# Patient Record
Sex: FEMALE | Race: WHITE | NOT HISPANIC OR LATINO | Employment: OTHER | URBAN - METROPOLITAN AREA
[De-identification: names, ages, dates, MRNs, and addresses within clinical notes are randomized per-mention and may not be internally consistent; named-entity substitution may affect disease eponyms.]

---

## 2020-12-23 ENCOUNTER — TRANSCRIBE ORDERS (OUTPATIENT)
Dept: NEUROSURGERY | Facility: CLINIC | Age: 55
End: 2020-12-23

## 2020-12-23 DIAGNOSIS — G89.4 CHRONIC PAIN SYNDROME: Primary | ICD-10-CM

## 2020-12-30 ENCOUNTER — OFFICE VISIT (OUTPATIENT)
Dept: NEUROSURGERY | Facility: CLINIC | Age: 55
End: 2020-12-30
Payer: MEDICARE

## 2020-12-30 VITALS
HEART RATE: 80 BPM | HEIGHT: 62 IN | BODY MASS INDEX: 38.83 KG/M2 | WEIGHT: 211 LBS | DIASTOLIC BLOOD PRESSURE: 65 MMHG | TEMPERATURE: 96.7 F | RESPIRATION RATE: 16 BRPM | SYSTOLIC BLOOD PRESSURE: 98 MMHG

## 2020-12-30 DIAGNOSIS — M48.061 SPINAL STENOSIS OF LUMBAR REGION, UNSPECIFIED WHETHER NEUROGENIC CLAUDICATION PRESENT: ICD-10-CM

## 2020-12-30 DIAGNOSIS — G89.4 CHRONIC PAIN SYNDROME: Primary | ICD-10-CM

## 2020-12-30 DIAGNOSIS — M54.9 BACK PAIN: ICD-10-CM

## 2020-12-30 DIAGNOSIS — M54.2 NECK PAIN: ICD-10-CM

## 2020-12-30 PROCEDURE — 99203 OFFICE O/P NEW LOW 30 MIN: CPT | Performed by: PHYSICIAN ASSISTANT

## 2020-12-30 RX ORDER — COVID-19 ANTIGEN TEST
440 KIT MISCELLANEOUS AS NEEDED
COMMUNITY
End: 2021-04-08 | Stop reason: ALTCHOICE

## 2020-12-30 RX ORDER — MULTIVIT-MIN/IRON/FOLIC ACID/K 18-600-40
2000 CAPSULE ORAL DAILY
COMMUNITY
End: 2022-02-21

## 2020-12-30 RX ORDER — PROPRANOLOL HYDROCHLORIDE 80 MG/1
80 TABLET ORAL
COMMUNITY
Start: 2020-10-22 | End: 2022-03-28 | Stop reason: ALTCHOICE

## 2020-12-30 RX ORDER — OXYCODONE AND ACETAMINOPHEN 10; 325 MG/1; MG/1
1 TABLET ORAL EVERY 6 HOURS PRN
COMMUNITY
Start: 2020-12-23 | End: 2021-01-22

## 2021-01-18 ENCOUNTER — HOSPITAL ENCOUNTER (OUTPATIENT)
Dept: RADIOLOGY | Facility: HOSPITAL | Age: 56
Discharge: HOME | End: 2021-01-18
Payer: MEDICARE

## 2021-01-18 DIAGNOSIS — M48.061 SPINAL STENOSIS OF LUMBAR REGION, UNSPECIFIED WHETHER NEUROGENIC CLAUDICATION PRESENT: ICD-10-CM

## 2021-01-18 DIAGNOSIS — M54.9 BACK PAIN: ICD-10-CM

## 2021-01-18 PROCEDURE — G1004 CDSM NDSC: HCPCS

## 2021-01-18 PROCEDURE — 72156 MRI NECK SPINE W/O & W/DYE: CPT

## 2021-01-18 PROCEDURE — A9585 GADOBUTROL INJECTION: HCPCS | Performed by: PHYSICIAN ASSISTANT

## 2021-01-18 RX ADMIN — GADOBUTROL 10 ML: 604.72 INJECTION INTRAVENOUS at 09:21

## 2021-02-03 ENCOUNTER — TELEPHONE (OUTPATIENT)
Dept: NEUROSURGERY | Facility: CLINIC | Age: 56
End: 2021-02-03

## 2021-02-04 ENCOUNTER — OFFICE VISIT (OUTPATIENT)
Dept: NEUROSURGERY | Facility: CLINIC | Age: 56
End: 2021-02-04
Payer: MEDICARE

## 2021-02-04 VITALS
SYSTOLIC BLOOD PRESSURE: 140 MMHG | HEIGHT: 62 IN | WEIGHT: 210 LBS | TEMPERATURE: 97.3 F | DIASTOLIC BLOOD PRESSURE: 90 MMHG | BODY MASS INDEX: 38.64 KG/M2

## 2021-02-04 DIAGNOSIS — G89.4 CHRONIC PAIN SYNDROME: ICD-10-CM

## 2021-02-04 DIAGNOSIS — Z98.1 S/P CERVICAL SPINAL FUSION: ICD-10-CM

## 2021-02-04 DIAGNOSIS — M54.2 NECK PAIN: Primary | ICD-10-CM

## 2021-02-04 DIAGNOSIS — R25.2 SPASTICITY: ICD-10-CM

## 2021-02-04 PROCEDURE — 99214 OFFICE O/P EST MOD 30 MIN: CPT | Performed by: PHYSICIAN ASSISTANT

## 2021-02-04 NOTE — PROGRESS NOTES
Neurosurgery Office Note  Bess Landry 48 y o  female MRN: [id number]      Assessment/Plan     Neck pain  Pt presents to the neurosurgery office for follow up of worsening neck pain with myelopathic symptoms to review MRI cervical spine  · Has hx of SCS insertions in cervical and thoracis spine at [facility] in 2019  The SCS in the Cervical spine was removed 3 days later secondary to a CSF leak  · Pt has the thoracic stimulator still  in place which helps her back pain  · She currently c/o worsening use of her hands, numbness, bilateral neck and shoulder pain/weakness, and difficulty with balance and falls  She denies UI/BI/saddle anesthesia    Imaging personally reviewed with attending:  · MRI cervical spine w/wo, 1/18/21: Patient is status post C3-T1 anterior fusion  Disc spacers are noted at C6-7 and C7-T1  Susceptibility artifact from the C7-T1 spacer projects into the spinal canal and there is potentially posterior slippage of the interdiscal component accounting for the susceptibility artifact  Plain film evaluation may be worthwhile to evaluate for posterior slippage  Several sites of myelomalacia in the cervical cord are again noted including slight increase at the T1 level when compared to the prior study  Remaining foci located at C4-5, C5, and C6-7 levels are unchanged  When compared to MRI cervical spine from 2019 at [facility], no significant change was appreciated    Plan  · Recommend continued conservative mgt with pain meds as prescribed and ongoing follow up with pain management  · Will obtain CT cervical spine without contrast to evaluate the fusion and bone quality   · Referral to PMR (Dr Duy Adame) to trial baclofen for spasticity and consideration of baclofen pump if indicated  · Follow up after CT and consult with Dr Duy Adame  If baclofen does not offer much relief will proceed with cervical SCS - this will need to be an open approach due to her extensive scar tissue     · Patient may need another SCS trial prior to surgery         Diagnoses and all orders for this visit:    Neck pain  -     CT cervical spine without contrast; Future  -     Ambulatory referral to Physical Medicine Rehab; Future    Chronic pain syndrome  -     CT cervical spine without contrast; Future  -     Ambulatory referral to Physical Medicine Rehab; Future    S/P cervical spinal fusion  -     CT cervical spine without contrast; Future  -     Ambulatory referral to Physical Medicine Rehab; Future    Spasticity  -     Ambulatory referral to Physical Medicine Rehab; Future            CHIEF COMPLAINT    Chief Complaint   Patient presents with    Follow-up     Chronic pain syndrome       HISTORY      This is a 70-year-old female with a past medical history significant for fibromyalgia, GERD, hypertension, IBS, ANG, urinary incontinence, history of multiple cervical fusions, thoracic spinal cord stimulator who is here today to review cervical MRI  She was seen several weeks ago for evaluation of worsening neck pain  She does have history of cervical spinal cord stimulator placement in April 2019 at [facility] in  Unfortunately, she developed a CSF leak and had to have her spinal cord stimulator removed  over the past few weeks, she noticed a significant worsening of her symptoms, she is describing neck pain radiating into her bilateral shoulders, difficulty using her hands, inability to use a knife and fork when eating, constantly dropping objects with her hands, difficulty with walking imbalance, multiple falls  She does have baseline urinary incontinence  She uses a cane at baseline  She is complaining of bilateral upper extremity weakness  She states the left arm is worse than the right arm  She does follow with Dr Alon Reyes in in pain management    She is taking percocet, gabapentin, Cymbalta she is interested in another cervical spinal cord stimulator if this is possible and may need to undergo another trial in order to proceed with this  MRI was read patient and her  today and shows no significant change to her MRI from 2019  At this time, it is unlikely there is any further surgery that would help her symptoms  Prior to trying another cervical spinal cord stimulator, we would like to send her to physiatry to trial baclofen  She may qualify for a baclofen pump to help with her spasticity  We will see her back after this consult with a CT cervical spine without contrast to evaluate her bone quality and cervical fusion to help with surgical planning  She is agreeable with this plan  REVIEW OF SYSTEMS    Review of Systems   Constitutional: Positive for fatigue  HENT: Negative  Eyes: Negative  Respiratory: Negative  Cardiovascular: Negative  Gastrointestinal: Negative  Endocrine: Negative  Genitourinary:        Incontinence   Musculoskeletal: Positive for arthralgias (Pain into the hips, buttocks and legs), back pain (Low back pain) and neck pain  Skin: Negative  Neurological: Positive for weakness (All extremities) and numbness (All extremities)  Cold sensation Right foot     Hematological: Negative  Psychiatric/Behavioral: Negative  ROS was personally reviewed and changes made as needed     Meds/Allergies     Current Outpatient Medications   Medication Sig Dispense Refill    Citalopram Hydrobromide (CELEXA PO) Take by mouth daily      DULoxetine (CYMBALTA) 60 mg delayed release capsule Take 60 mg by mouth 2 (two) times a day   gabapentin (NEURONTIN) 800 mg tablet Take 900 mg by mouth 3 (three) times a day  3 capsuless of 300mg TID       lansoprazole (PREVACID) 30 mg capsule Take 30 mg by mouth every morning   lisinopril-hydrochlorothiazide (PRINZIDE,ZESTORETIC) 20-25 MG per tablet Take 1 tablet by mouth daily        Menthol, Topical Analgesic, (BIOFREEZE EX) Apply topically      Multiple Vitamins-Minerals (MULTIVITAMIN ADULT PO) daily      Naproxen Sodium (Aleve) 220 MG CAPS Take 440 mg by mouth as needed      oxyCODONE-acetaminophen (PERCOCET) 5-325 mg per tablet Si-2 tabs PO Q4H PRN    Ongoing therapy 40 tablet 0    propranolol (INDERAL) 80 mg tablet Take 80 mg by mouth daily      rOPINIRole (REQUIP) 2 mg tablet Take 2 mg by mouth daily at bedtime   Vitamin D, Cholecalciferol, 50 MCG (2000 UT) CAPS Take 2,000 Units by mouth daily      Camphor-Menthol-Methyl Sal (FLEXALL ULTRA PLUS EX) Salonpas      methocarbamol (ROBAXIN) 750 mg tablet Take 750 mg by mouth 3 (three) times a day  No current facility-administered medications for this visit  Allergies   Allergen Reactions    Strawberry Extract Anaphylaxis    Iodine Rash     IV dye reaction: rash pt denies iodine allergy and states ct scan dye allergy only    Other      CT Scan IV Dye  Hives         PAST HISTORY    Past Medical History:   Diagnosis Date    Abdominal pain     Anxiety     Arthritis     Back pain     Breathing difficulty     Depression     Diarrhea     Dizziness     Edentulous     Fibromyalgia     Fibromyalgia     Full dentures     Upper and Lower  Can't wear right now due to recent surgery for sleep surgery    Heartburn     History of colon polyps     History of fall     History of HPV infection     Hypertension     IBS (irritable bowel syndrome)     Intermittent palpitations     Irregular heart beat     Leg pain, bilateral     Neuropathy     Bilateral hands, arms   Numbness tops of legs and buttocks and feet    Pneumonia     History of walking pneumonia multiple times    Restless leg syndrome     Seasonal allergies     Shortness of breath     Sleep apnea     History of- recent surgery to correct    Spinal stenosis     Urinary incontinence     Use of cane as ambulatory aid     Wears glasses        Past Surgical History:   Procedure Laterality Date    BLADDER SURGERY  02/10/2016    botox injections    CERVICAL FUSION  , , 2004    x4 C3-4, C4-5, C5-6, C6-7    CHOLECYSTECTOMY  2000    lap ad    COLONOSCOPY      COLONOSCOPY N/A 5/6/2016    Procedure: COLONOSCOPY;  Surgeon: Dominic Sanabria MD;  [facility]; Service:     GYNECOLOGIC CRYOSURGERY      HI ARTHRODESIS ANT INTERBODY MIN DISCECTOMY,LUMBAR N/A 9/13/2016    Procedure: FUSION LUMBAR INTERBODY ANTERIOR APPROACH  L4-5 L5-S1  WITH POSTERIOR INSTRUMENTATION ;  Surgeon: Cleve Patterson MD;  Location: [facility];  Service: Orthopedics    HI EXCISION 7[facility] N/A 8/24/2016    Procedure: Loretta Kocher;  Surgeon: Lelia Hope MD;  [facility];  Service: ENT    HI PALATOPHAYNGOPLASTY N/A 8/24/2016    Procedure: UVULOPALATOPHARYNGOPLASTY (UPPP); Surgeon: Lelia Hope MD;  [facility];  Service: ENT    SKIN BIOPSY  2015    R arm, scalp- all benign    SPINAL CORD STIMULATOR IMPLANT      lumbar  in place    SPINAL CORD STIMULATOR REMOVAL      cervical    TONSILLECTOMY      TUBAL LIGATION         Social History     Tobacco Use    Smoking status: Current Every Day Smoker     Packs/day: 1 00     Years: 38 00     Pack years: 38 00    Smokeless tobacco: Never Used   Substance Use Topics    Alcohol use: No    Drug use: No     Frequency: 7 0 times per week     Comment: CBD oil, THC       Family History   Problem Relation Age of Onset    Thyroid cancer Mother     Thyroid cancer Father     Alcohol abuse Sister     No Known Problems Brother     Cancer Family          Above history personally reviewed  EXAM    Vitals:Blood pressure 140/90, temperature (!) 97 3 °F (36 3 °C), temperature source Temporal, height 5' 2" (1 575 m), weight 95 3 kg (210 lb), not currently breastfeeding  ,Body mass index is 38 41 kg/m²  Physical Exam  Vitals signs and nursing note reviewed  Constitutional:       Appearance: Normal appearance  She is well-developed and normal weight  HENT:      Head: Normocephalic and atraumatic     Eyes:      Extraocular Movements: Extraocular movements intact  Pupils: Pupils are equal, round, and reactive to light  Neck:      Musculoskeletal: Normal range of motion  Cardiovascular:      Rate and Rhythm: Normal rate  Pulmonary:      Effort: Pulmonary effort is normal  No respiratory distress  Abdominal:      Palpations: Abdomen is soft  Musculoskeletal: Normal range of motion  Skin:     General: Skin is warm and dry  Neurological:      Mental Status: She is alert and oriented to person, place, and time  Gait: Tandem walk abnormal       Deep Tendon Reflexes:      Reflex Scores:       Tricep reflexes are 3+ on the right side and 3+ on the left side  Bicep reflexes are 3+ on the right side and 3+ on the left side  Brachioradialis reflexes are 3+ on the right side and 3+ on the left side  Patellar reflexes are 3+ on the right side and 3+ on the left side  Achilles reflexes are 3+ on the right side and 3+ on the left side  Psychiatric:         Mood and Affect: Mood normal          Speech: Speech normal          Behavior: Behavior normal          Thought Content: Thought content normal          Judgment: Judgment normal          Neurologic Exam     Mental Status   Oriented to person, place, and time  Follows 2 step commands  Attention: normal  Concentration: normal    Speech: speech is normal   Level of consciousness: alert  Knowledge: good  Able to repeat  Normal comprehension  Cranial Nerves   Cranial nerves II through XII intact  CN III, IV, VI   Pupils are equal, round, and reactive to light      Motor Exam   Muscle bulk: normal  Overall muscle tone: normal  Left arm pronator drift: absent  5/5 bilateral deltoids  4/5 bilateral bi/tri  5/5 bilateral wrist extension  4/5 bilateral wrist flexion  5/5 LEFT IO, 4/5 RIGHT IO    5/5 BLE     Sensory Exam   Decreased sensation to LT and PP from elbows to fingers bilaterally, L=R  Difficulty with cervical extension     Gait, Coordination, and Reflexes     Coordination   Tandem walking coordination: abnormal    Tremor   Resting tremor: absent    Reflexes   Right brachioradialis: 3+  Left brachioradialis: 3+  Right biceps: 3+  Left biceps: 3+  Right triceps: 3+  Left triceps: 3+  Right patellar: 3+  Left patellar: 3+  Right achilles: 3+  Left achilles: 3+  Right Champion: present  Left Champion: present  Right ankle clonus: absent  Left ankle clonus: absent        MEDICAL DECISION MAKING    Imaging Studies:     Mri Cervical Spine With And Without Contrast    Result Date: 1/19/2021  Narrative: MRI CERVICAL SPINE WITH AND WITHOUT CONTRAST INDICATION: M48 061: Spinal stenosis, lumbar region without neurogenic claudication M54 9: Dorsalgia, unspecified  COMPARISON:  Outside MRI December 24, 2019  TECHNIQUE:  Sagittal T1, sagittal T2, sagittal inversion recovery, axial 2D merge and axial T2  Sagittal T1 and axial T1 postcontrast   IV Contrast:  10 mL of Gadobutrol injection (SINGLE-DOSE) IMAGE QUALITY:  Diagnostic  FINDINGS: ALIGNMENT:  Straightening of the cervical lordosis  The patient is status post anterior fusion C3-T1  Metallic susceptibility artifact is identified within the vertebral bodies  Susceptibility artifact extends into the ventral aspect of the spinal canal at the C7-T1 level  This may indicate some degree of slippage of the C7-T1 intradisc plate posteriorly  This is unchanged from the outside MRI  MARROW SIGNAL:  Normal marrow signal is identified within the visualized bony structures  No discrete marrow lesion  CERVICAL AND VISUALIZED UPPER THORACIC CORD:  Several foci of cord signal abnormality are identified including C4-5, posterior to the C6 vertebral body, at C6-7 level, and at the inferior T1 level  The T1 lesion has increased in size from the prior study  This is seen best on sagittal series 3 image 7   PREVERTEBRAL AND PARASPINAL SOFT TISSUES:  Degenerative changes are seen in the sternoclavicular joints bilaterally left more so than right  VISUALIZED POSTERIOR FOSSA:  The visualized posterior fossa demonstrates no abnormal signal  CERVICAL DISC SPACES: C2-C3:  Moderate facet hypertrophy  Mild central stenosis  Foramina patent  C3-C4:  This level has been fused  Tiny uncinate osteophyte on the left  C4-C5:  This level is fused  No significant central or foraminal narrowing  C5-C6:  This level is fused  No significant central stenosis  Small marginal osteophytes are seen  There is C6-C7:  Mild facet hypertrophy  Small marginal osteophyte on left  Minimal foraminal narrowing  C7-T1:  Susceptibility artifact projects into the spinal canal ventrally as described  Possible mild central stenosis  UPPER THORACIC DISC SPACES:  Normal  POSTCONTRAST IMAGING:  Normal      Impression: Patient is status post C3-T1 anterior fusion  Disc spacers are noted at C6-7 and C7-T1  Susceptibility artifact from the C7-T1 spacer projects into the spinal canal and there is potentially posterior slippage of the interdiscal component accounting for  the susceptibility artifact  Plain film evaluation may be worthwhile to evaluate for posterior slippage  Several sites of myelomalacia in the cervical cord are again noted including slight increase at the T1 level when compared to the prior study  Remaining foci located at C4-5, C5, and C6-7 levels are unchanged  Bilateral sternoclavicular joint degenerative changes  Workstation performed: [id number]       I have personally reviewed pertinent reports     and I have personally reviewed pertinent films in PACS

## 2021-02-04 NOTE — PATIENT INSTRUCTIONS
Return in 6 weeks after establishing with Dr Betty Dykes    Obtain CT cervical spine prior to appointment

## 2021-02-04 NOTE — ASSESSMENT & PLAN NOTE
Pt presents to the neurosurgery office for follow up of worsening neck pain with myelopathic symptoms to review MRI cervical spine  · Has hx of SCS insertions in cervical and thoracis spine at [facility] in 2019  The SCS in the Cervical spine was removed 3 days later secondary to a CSF leak  · Pt has the thoracic stimulator still  in place which helps her back pain  · She currently c/o worsening use of her hands, numbness, bilateral neck and shoulder pain/weakness, and difficulty with balance and falls  She denies UI/BI/saddle anesthesia    Imaging personally reviewed with attending:  · MRI cervical spine w/wo, 1/18/21: Patient is status post C3-T1 anterior fusion  Disc spacers are noted at C6-7 and C7-T1  Susceptibility artifact from the C7-T1 spacer projects into the spinal canal and there is potentially posterior slippage of the interdiscal component accounting for the susceptibility artifact  Plain film evaluation may be worthwhile to evaluate for posterior slippage  Several sites of myelomalacia in the cervical cord are again noted including slight increase at the T1 level when compared to the prior study  Remaining foci located at C4-5, C5, and C6-7 levels are unchanged  When compared to MRI cervical spine from 2019 at [facility], no significant change was appreciated    Plan  · Recommend continued conservative mgt with pain meds as prescribed and ongoing follow up with pain management  · Will obtain CT cervical spine without contrast to evaluate the fusion and bone quality   · Referral to PMR (Dr Betty Dykes) to trial baclofen for spasticity and consideration of baclofen pump if indicated  · Follow up after CT and consult with Dr Betty Dykes  If baclofen does not offer much relief will proceed with cervical SCS - this will need to be an open approach due to her extensive scar tissue     · Patient may need another SCS trial prior to surgery

## 2021-03-08 ENCOUNTER — HOSPITAL ENCOUNTER (OUTPATIENT)
Dept: CT IMAGING | Facility: HOSPITAL | Age: 56
Discharge: HOME | End: 2021-03-08
Payer: MEDICARE

## 2021-03-08 DIAGNOSIS — M54.2 NECK PAIN: ICD-10-CM

## 2021-03-08 DIAGNOSIS — Z98.1 S/P CERVICAL SPINAL FUSION: ICD-10-CM

## 2021-03-08 DIAGNOSIS — G89.4 CHRONIC PAIN SYNDROME: ICD-10-CM

## 2021-03-08 PROCEDURE — G1004 CDSM NDSC: HCPCS

## 2021-03-08 PROCEDURE — 72125 CT NECK SPINE W/O DYE: CPT

## 2021-03-11 ENCOUNTER — OFFICE VISIT (OUTPATIENT)
Dept: NEUROSURGERY | Facility: CLINIC | Age: 56
End: 2021-03-11
Payer: MEDICARE

## 2021-03-11 VITALS
BODY MASS INDEX: 37.17 KG/M2 | SYSTOLIC BLOOD PRESSURE: 131 MMHG | HEIGHT: 62 IN | TEMPERATURE: 99.5 F | WEIGHT: 202 LBS | RESPIRATION RATE: 16 BRPM | DIASTOLIC BLOOD PRESSURE: 90 MMHG | HEART RATE: 125 BPM

## 2021-03-11 DIAGNOSIS — M54.2 NECK PAIN: ICD-10-CM

## 2021-03-11 DIAGNOSIS — G89.4 CHRONIC PAIN SYNDROME: Primary | ICD-10-CM

## 2021-03-11 DIAGNOSIS — M54.2 CERVICALGIA: ICD-10-CM

## 2021-03-11 DIAGNOSIS — R25.2 SPASTICITY: ICD-10-CM

## 2021-03-11 PROCEDURE — 99214 OFFICE O/P EST MOD 30 MIN: CPT | Performed by: PHYSICIAN ASSISTANT

## 2021-03-11 RX ORDER — CHLORHEXIDINE GLUCONATE 0.12 MG/ML
15 RINSE ORAL ONCE
Status: CANCELLED | OUTPATIENT
Start: 2021-03-11 | End: 2021-03-11

## 2021-03-11 RX ORDER — CEFAZOLIN SODIUM 2 G/50ML
2000 SOLUTION INTRAVENOUS ONCE
Status: CANCELLED | OUTPATIENT
Start: 2021-04-09 | End: 2021-03-11

## 2021-03-11 RX ORDER — CEFAZOLIN SODIUM 2 G/50ML
2000 SOLUTION INTRAVENOUS ONCE
Status: CANCELLED | OUTPATIENT
Start: 2021-04-16 | End: 2021-03-11

## 2021-03-11 RX ORDER — BACLOFEN 10 MG/1
10 TABLET ORAL 3 TIMES DAILY
COMMUNITY
Start: 2021-02-25 | End: 2021-06-17 | Stop reason: ALTCHOICE

## 2021-03-11 NOTE — PROGRESS NOTES
Neurosurgery Office Note  Jason De La Vega 48 y o  female MRN: [id number]      Assessment/Plan     Neck pain  Pt presents to the neurosurgery office for follow up of worsening neck pain with myelopathic symptoms to review CT cervical spine and discuss surgical options  · Has hx of SCS insertions in cervical and thoracis spine at [facility] in 2019  The SCS in the Cervical spine was removed 3 days later secondary to a CSF leak  · Pt has the thoracic stimulator still  in place which helps her back pain ([address])  · She currently c/o worsening use of her hands, numbness, bilateral neck and shoulder pain/weakness, and difficulty with balance and falls  She denies UI/BI/saddle anesthesia  · She recently started baclofen after see Dr Ade Jackson    Imaging personally reviewed with attending:  · MRI cervical spine w/wo, 1/18/21: Patient is status post C3-T1 anterior fusion  Disc spacers are noted at C6-7 and C7-T1  Susceptibility artifact from the C7-T1 spacer projects into the spinal canal and there is potentially posterior slippage of the interdiscal component accounting for the susceptibility artifact  Plain film evaluation may be worthwhile to evaluate for posterior slippage  Several sites of myelomalacia in the cervical cord are again noted including slight increase at the T1 level when compared to the prior study  Remaining foci located at C4-5, C5, and C6-7 levels are unchanged  When compared to MRI cervical spine from 2019 at [facility], no significant change was appreciated  · CT cervical spine w/o, 3/8/21: Final read pending  Imaging personally reviewed by Dr Copeland Reason today    Plan  · Continue baclofen as instructed and follow up with Dr Ade Jackson  · We had a lengthy discussion with the patient and she elected to proceed with the cervical SCS as opposed to a baclofen pump right now as she is having significant dysesthesias and the oral baclofen isn't working well     · Risks and benefits of the surgery were discussed in great detail  Due to her extensive surgeries and scar tissue, there is a higher chance of nerve damage and CSF leak  There is a higher change of inability to insert SCS introperatively due to scarring  She understands these risks and would like to proceed  · Her last psychology evaluation was 3-4 years ago for the thoracic SCS; she will obtain these records but a referral was provided today for a new evaluation if needed  · Our office will call to schedule surgery  Diagnoses and all orders for this visit:    Neck pain  -     Ambulatory referral to Psychology; Future    Spasticity  -     Ambulatory referral to Psychology; Future    Chronic pain syndrome  -     Ambulatory referral to Psychology; Future    Other orders  -     baclofen 10 mg tablet; Take 10 mg by mouth 3 (three) times a day            CHIEF COMPLAINT    Chief Complaint   Patient presents with    Follow-up      5 weeks instead of 6 weeks ct @ [facility] shared Sotero Jalloh / yuval ct cervical (3/8/21)       HISTORY    This is a 61-year-old female with a past medical history significant for fibromyalgia, GERD, hypertension, IBS, ANG, urinary incontinence, history of multiple cervical fusions, thoracic spinal cord stimulator who is here today to review CT cervical spine in evaluate her symptoms on baclofen  She does have a history of cervical spinal cord stimulator placement in April 2019 at [facility]  Unfortunately, she developed a CSF leak and had to have her spinal cord stimulator removed 3 days later  At her last visit, we referred her to Dr Victorina Graf for initiation of baclofen and consideration of a baclofen pump as the patient is very myelopathic and spastic  She has severe neck pain and bilateral upper extremity pain and difficulty with fine motor skills and is interested in replacement of her cervical spinal cord stimulator  However, we feel that she may benefit more from a baclofen pump    This could address her myelopathic symptoms as well as her pain       Since we last saw the patient, she states she is having increasing pain wrapping around her rib cage "like cellophane" and radiating down her torso into her legs  She is still complaining of neck pain radiating into her bilateral shoulders, difficulties in her hands, inability to use a knife and fork when eating, constantly dropping objects with her hands, difficulty with walking and balance, multiple falls  She has baseline urinary incontinence  She uses a cane at baseline  She does state that her left arm continues to be worse than her right arm  She does follow with Dr Maryln Cheadle in pain management  She is taking Percocet, gabapentin, and Cymbalta  She recently started baclofen and is currently taking 10 mg 3 times daily  She does not feel this is helping her pain  MRI cervical spine was reviewed at her last appointment and does not show any significant change compared to her MRI from 2019  CT cervical spine was reviewed today with Dr Ade Charles  This shows an intact cervical fusion with no hardware failure  REVIEW OF SYSTEMS    Review of Systems   Constitutional: Positive for fatigue  Feeling poorly   HENT: Positive for trouble swallowing  Eyes: Negative  Respiratory: Positive for shortness of breath  Cardiovascular: Negative  Gastrointestinal: Positive for abdominal pain  Endocrine: Negative  Cold sensation Right foot   Genitourinary: Positive for frequency and urgency  Incontinence and leakage, especially when coughing/sneezing   Musculoskeletal: Positive for arthralgias (Pain into the hips, buttocks and legs), back pain (constant across low back pain) and neck pain  New pain, from under breast down to feet, extreme pain/tightness   Skin: Negative  Allergic/Immunologic: Positive for food allergies (strawberry/strawberry extract)     Neurological: Positive for weakness (All extremities), numbness (All extremities) and headaches (and feels heart beat left side of head)  Negative for dizziness, seizures and syncope  Hematological: Negative  Psychiatric/Behavioral: Positive for sleep disturbance (due to pain)  ROS was personally reviewed and changes made as needed     Meds/Allergies     Current Outpatient Medications   Medication Sig Dispense Refill    baclofen 10 mg tablet Take 10 mg by mouth 3 (three) times a day      Camphor-Menthol-Methyl Sal (FLEXALL ULTRA PLUS EX) Salonpas      Citalopram Hydrobromide (CELEXA PO) Take by mouth daily      DULoxetine (CYMBALTA) 60 mg delayed release capsule Take 60 mg by mouth 2 (two) times a day   gabapentin (NEURONTIN) 300 mg capsule Take 900 mg by mouth 3 (three) times a day 3 capsuless of 300mg TID      lansoprazole (PREVACID) 30 mg capsule Take 30 mg by mouth every morning   lisinopril-hydrochlorothiazide (PRINZIDE,ZESTORETIC) 20-25 MG per tablet Take 1 tablet by mouth daily   Menthol, Topical Analgesic, (BIOFREEZE EX) Apply topically      Naproxen Sodium (Aleve) 220 MG CAPS Take 440 mg by mouth as needed      oxyCODONE-acetaminophen (PERCOCET) 5-325 mg per tablet Si-2 tabs PO Q4H PRN    Ongoing therapy 40 tablet 0    propranolol (INDERAL) 80 mg tablet Take 80 mg by mouth daily      rOPINIRole (REQUIP) 2 mg tablet Take 2 mg by mouth daily at bedtime   Vitamin D, Cholecalciferol, 50 MCG ( UT) CAPS Take 2,000 Units by mouth daily      methocarbamol (ROBAXIN) 750 mg tablet Take 750 mg by mouth 3 (three) times a day   Multiple Vitamins-Minerals (MULTIVITAMIN ADULT PO) daily       No current facility-administered medications for this visit          Allergies   Allergen Reactions    Strawberry Extract Anaphylaxis    Iodine Rash     IV dye reaction: rash pt denies iodine allergy and states ct scan dye allergy only    Other      CT Scan IV Dye  Hives         PAST HISTORY    Past Medical History:   Diagnosis Date    Abdominal pain     Anxiety     Arthritis     Back pain     Breathing difficulty     Depression     Diarrhea     Dizziness     Edentulous     Fibromyalgia     Fibromyalgia     Full dentures     Upper and Lower  Can't wear right now due to recent surgery for sleep surgery    Heartburn     History of colon polyps     History of fall     History of HPV infection     Hypertension     IBS (irritable bowel syndrome)     Intermittent palpitations     Irregular heart beat     Leg pain, bilateral     Neuropathy     Bilateral hands, arms  Numbness tops of legs and buttocks and feet    Pneumonia     History of walking pneumonia multiple times    Restless leg syndrome     Seasonal allergies     Shortness of breath     Sleep apnea     History of- recent surgery to correct    Spinal stenosis     Urinary incontinence     Use of cane as ambulatory aid     Wears glasses        Past Surgical History:   Procedure Laterality Date    BLADDER SURGERY  02/10/2016    botox injections    CERVICAL FUSION  2011, 1997, 2004    x4 C3-4, C4-5, C5-6, C6-7    CHOLECYSTECTOMY  2000    lap ad    COLONOSCOPY      COLONOSCOPY N/A 5/6/2016    Procedure: COLONOSCOPY;  Surgeon: Adryan Allan MD;  [facility]: [facility]; Service:     GYNECOLOGIC CRYOSURGERY      TN ARTHRODESIS ANT INTERBODY MIN DISCECTOMY,LUMBAR N/A 9/13/2016    Procedure: FUSION LUMBAR INTERBODY ANTERIOR APPROACH  L4-5 L5-S1  WITH POSTERIOR INSTRUMENTATION ;  Surgeon: Comfort Ocasio MD;  Location: [facility];  Service: Orthopedics    TN EXCISION 708 [facility] N/A 8/24/2016    Procedure: Gaila Son;  Surgeon: Jeremias Del Valle MD;  [facility];  Service: ENT    TN PALATOPHAYNGOPLASTY N/A 8/24/2016    Procedure: UVULOPALATOPHARYNGOPLASTY (UPPP);   Surgeon: Jeremias Del Valle MD;  [facility];  Service: ENT    SKIN BIOPSY  2015    R arm, scalp- all benign    SPINAL CORD STIMULATOR IMPLANT      lumbar  in place    SPINAL CORD STIMULATOR REMOVAL      cervical    TONSILLECTOMY      TUBAL LIGATION         Social History     Tobacco Use    Smoking status: Current Every Day Smoker     Packs/day: 1 00     Years: 38 00     Pack years: 38 00    Smokeless tobacco: Never Used   Substance Use Topics    Alcohol use: No    Drug use: No     Frequency: 7 0 times per week     Comment: CBD oil, THC       Family History   Problem Relation Age of Onset    Thyroid cancer Mother     Thyroid cancer Father     Alcohol abuse Sister     No Known Problems Brother     Cancer Family          Above history personally reviewed  EXAM    Vitals:Blood pressure 131/90, pulse (!) 125, temperature 99 5 °F (37 5 °C), resp  rate 16, height 5' 2" (1 575 m), weight 91 6 kg (202 lb), not currently breastfeeding  ,Body mass index is 36 95 kg/m²  Physical Exam  Vitals signs and nursing note reviewed  Constitutional:       Appearance: Normal appearance  She is well-developed and normal weight  HENT:      Head: Normocephalic and atraumatic  Eyes:      Extraocular Movements: Extraocular movements intact  Pupils: Pupils are equal, round, and reactive to light  Neck:      Musculoskeletal: Normal range of motion  Cardiovascular:      Rate and Rhythm: Normal rate  Pulmonary:      Effort: Pulmonary effort is normal  No respiratory distress  Abdominal:      Palpations: Abdomen is soft  Musculoskeletal: Normal range of motion  Skin:     General: Skin is warm and dry  Neurological:      Mental Status: She is alert and oriented to person, place, and time  Gait: Tandem walk abnormal       Deep Tendon Reflexes:      Reflex Scores:       Tricep reflexes are 2+ on the right side and 2+ on the left side  Bicep reflexes are 2+ on the right side and 2+ on the left side  Brachioradialis reflexes are 2+ on the right side and 2+ on the left side  Patellar reflexes are 1+ on the right side and 1+ on the left side    Psychiatric:         Mood and Affect: Mood normal  Speech: Speech normal          Behavior: Behavior normal          Thought Content: Thought content normal          Judgment: Judgment normal          Neurologic Exam     Mental Status   Oriented to person, place, and time  Follows 2 step commands  Attention: normal  Concentration: normal    Speech: speech is normal   Level of consciousness: alert  Knowledge: good  Able to repeat  Normal comprehension  Cranial Nerves   Cranial nerves II through XII intact  CN III, IV, VI   Pupils are equal, round, and reactive to light  Motor Exam   Muscle bulk: normal  Overall muscle tone: increased (BLE)  Right arm pronator drift: absent  Left arm pronator drift: absent    Strength   Strength 5/5 except as noted  4/5 bilateral bi/tri  4/5 left wrist extension  4/5 BLE throughout  Limited cervical extension     Sensory Exam   Decreased sensation bilateral elbows to all fingers, L>R  Paresthesias bilateral fingertips  Decreased sensation bilateral knees to feet with dysesthesias bilateral feet     Gait, Coordination, and Reflexes     Gait  Gait: spastic    Coordination   Tandem walking coordination: abnormal    Tremor   Resting tremor: absent    Reflexes   Right brachioradialis: 2+  Left brachioradialis: 2+  Right biceps: 2+  Left biceps: 2+  Right triceps: 2+  Left triceps: 2+  Right patellar: 1+  Left patellar: 1+  Right Champion: absent  Left Champion: absent  Right ankle clonus: absent  Left ankle clonus: absent        MEDICAL DECISION MAKING    Imaging Studies:     No results found  I have personally reviewed pertinent reports     and I have personally reviewed pertinent films in PACS

## 2021-03-11 NOTE — H&P (VIEW-ONLY)
Neurosurgery Office Note  Christy Joshua 48 y o  female MRN: [id number]      Assessment/Plan     Neck pain  Pt presents to the neurosurgery office for follow up of worsening neck pain with myelopathic symptoms to review CT cervical spine and discuss surgical options  · Has hx of SCS insertions in cervical and thoracis spine at [facility] in 2019  The SCS in the Cervical spine was removed 3 days later secondary to a CSF leak  · Pt has the thoracic stimulator still  in place which helps her back pain ([address])  · She currently c/o worsening use of her hands, numbness, bilateral neck and shoulder pain/weakness, and difficulty with balance and falls  She denies UI/BI/saddle anesthesia  · She recently started baclofen after see Dr Bharati Medina    Imaging personally reviewed with attending:  · MRI cervical spine w/wo, 1/18/21: Patient is status post C3-T1 anterior fusion  Disc spacers are noted at C6-7 and C7-T1  Susceptibility artifact from the C7-T1 spacer projects into the spinal canal and there is potentially posterior slippage of the interdiscal component accounting for the susceptibility artifact  Plain film evaluation may be worthwhile to evaluate for posterior slippage  Several sites of myelomalacia in the cervical cord are again noted including slight increase at the T1 level when compared to the prior study  Remaining foci located at C4-5, C5, and C6-7 levels are unchanged  When compared to MRI cervical spine from 2019 at [facility], no significant change was appreciated  · CT cervical spine w/o, 3/8/21: Final read pending  Imaging personally reviewed by Dr Nathan Ferguson today    Plan  · Continue baclofen as instructed and follow up with Dr Bharati Medina  · We had a lengthy discussion with the patient and she elected to proceed with the cervical SCS as opposed to a baclofen pump right now as she is having significant dysesthesias and the oral baclofen isn't working well     · Risks and benefits of the surgery were discussed in great detail  Due to her extensive surgeries and scar tissue, there is a higher chance of nerve damage and CSF leak  There is a higher change of inability to insert SCS introperatively due to scarring  She understands these risks and would like to proceed  · Her last psychology evaluation was 3-4 years ago for the thoracic SCS; she will obtain these records but a referral was provided today for a new evaluation if needed  · Our office will call to schedule surgery  Diagnoses and all orders for this visit:    Neck pain  -     Ambulatory referral to Psychology; Future    Spasticity  -     Ambulatory referral to Psychology; Future    Chronic pain syndrome  -     Ambulatory referral to Psychology; Future    Other orders  -     baclofen 10 mg tablet; Take 10 mg by mouth 3 (three) times a day            CHIEF COMPLAINT    Chief Complaint   Patient presents with    Follow-up      5 weeks instead of 6 weeks ct @ [facility] shared Arina Rea / yuval ct cervical (3/8/21)       HISTORY    This is a 28-year-old female with a past medical history significant for fibromyalgia, GERD, hypertension, IBS, ANG, urinary incontinence, history of multiple cervical fusions, thoracic spinal cord stimulator who is here today to review CT cervical spine in evaluate her symptoms on baclofen  She does have a history of cervical spinal cord stimulator placement in April 2019 at [facility]  Unfortunately, she developed a CSF leak and had to have her spinal cord stimulator removed 3 days later  At her last visit, we referred her to Dr Adryan Llamas for initiation of baclofen and consideration of a baclofen pump as the patient is very myelopathic and spastic  She has severe neck pain and bilateral upper extremity pain and difficulty with fine motor skills and is interested in replacement of her cervical spinal cord stimulator  However, we feel that she may benefit more from a baclofen pump    This could address her myelopathic symptoms as well as her pain       Since we last saw the patient, she states she is having increasing pain wrapping around her rib cage "like cellophane" and radiating down her torso into her legs  She is still complaining of neck pain radiating into her bilateral shoulders, difficulties in her hands, inability to use a knife and fork when eating, constantly dropping objects with her hands, difficulty with walking and balance, multiple falls  She has baseline urinary incontinence  She uses a cane at baseline  She does state that her left arm continues to be worse than her right arm  She does follow with Dr Jamarcus Christopher in pain management  She is taking Percocet, gabapentin, and Cymbalta  She recently started baclofen and is currently taking 10 mg 3 times daily  She does not feel this is helping her pain  MRI cervical spine was reviewed at her last appointment and does not show any significant change compared to her MRI from 2019  CT cervical spine was reviewed today with Dr Zuly Walters  This shows an intact cervical fusion with no hardware failure  REVIEW OF SYSTEMS    Review of Systems   Constitutional: Positive for fatigue  Feeling poorly   HENT: Positive for trouble swallowing  Eyes: Negative  Respiratory: Positive for shortness of breath  Cardiovascular: Negative  Gastrointestinal: Positive for abdominal pain  Endocrine: Negative  Cold sensation Right foot   Genitourinary: Positive for frequency and urgency  Incontinence and leakage, especially when coughing/sneezing   Musculoskeletal: Positive for arthralgias (Pain into the hips, buttocks and legs), back pain (constant across low back pain) and neck pain  New pain, from under breast down to feet, extreme pain/tightness   Skin: Negative  Allergic/Immunologic: Positive for food allergies (strawberry/strawberry extract)     Neurological: Positive for weakness (All extremities), numbness (All extremities) and headaches (and feels heart beat left side of head)  Negative for dizziness, seizures and syncope  Hematological: Negative  Psychiatric/Behavioral: Positive for sleep disturbance (due to pain)  ROS was personally reviewed and changes made as needed     Meds/Allergies     Current Outpatient Medications   Medication Sig Dispense Refill    baclofen 10 mg tablet Take 10 mg by mouth 3 (three) times a day      Camphor-Menthol-Methyl Sal (FLEXALL ULTRA PLUS EX) Salonpas      Citalopram Hydrobromide (CELEXA PO) Take by mouth daily      DULoxetine (CYMBALTA) 60 mg delayed release capsule Take 60 mg by mouth 2 (two) times a day   gabapentin (NEURONTIN) 300 mg capsule Take 900 mg by mouth 3 (three) times a day 3 capsuless of 300mg TID      lansoprazole (PREVACID) 30 mg capsule Take 30 mg by mouth every morning   lisinopril-hydrochlorothiazide (PRINZIDE,ZESTORETIC) 20-25 MG per tablet Take 1 tablet by mouth daily   Menthol, Topical Analgesic, (BIOFREEZE EX) Apply topically      Naproxen Sodium (Aleve) 220 MG CAPS Take 440 mg by mouth as needed      oxyCODONE-acetaminophen (PERCOCET) 5-325 mg per tablet Si-2 tabs PO Q4H PRN    Ongoing therapy 40 tablet 0    propranolol (INDERAL) 80 mg tablet Take 80 mg by mouth daily      rOPINIRole (REQUIP) 2 mg tablet Take 2 mg by mouth daily at bedtime   Vitamin D, Cholecalciferol, 50 MCG ( UT) CAPS Take 2,000 Units by mouth daily      methocarbamol (ROBAXIN) 750 mg tablet Take 750 mg by mouth 3 (three) times a day   Multiple Vitamins-Minerals (MULTIVITAMIN ADULT PO) daily       No current facility-administered medications for this visit          Allergies   Allergen Reactions    Strawberry Extract Anaphylaxis    Iodine Rash     IV dye reaction: rash pt denies iodine allergy and states ct scan dye allergy only    Other      CT Scan IV Dye  Hives         PAST HISTORY    Past Medical History:   Diagnosis Date    Abdominal pain     Anxiety     Arthritis     Back pain     Breathing difficulty     Depression     Diarrhea     Dizziness     Edentulous     Fibromyalgia     Fibromyalgia     Full dentures     Upper and Lower  Can't wear right now due to recent surgery for sleep surgery    Heartburn     History of colon polyps     History of fall     History of HPV infection     Hypertension     IBS (irritable bowel syndrome)     Intermittent palpitations     Irregular heart beat     Leg pain, bilateral     Neuropathy     Bilateral hands, arms  Numbness tops of legs and buttocks and feet    Pneumonia     History of walking pneumonia multiple times    Restless leg syndrome     Seasonal allergies     Shortness of breath     Sleep apnea     History of- recent surgery to correct    Spinal stenosis     Urinary incontinence     Use of cane as ambulatory aid     Wears glasses        Past Surgical History:   Procedure Laterality Date    BLADDER SURGERY  02/10/2016    botox injections    CERVICAL FUSION  2011, 1997, 2004    x4 C3-4, C4-5, C5-6, C6-7    CHOLECYSTECTOMY  2000    lap ad    COLONOSCOPY      COLONOSCOPY N/A 5/6/2016    Procedure: COLONOSCOPY;  Surgeon: Dallas Montoya MD;  [facility]: [facility]; Service:     GYNECOLOGIC CRYOSURGERY      [place] ARTHRODESIS ANT INTERBODY MIN DISCECTOMY,LUMBAR N/A 9/13/2016    Procedure: FUSION LUMBAR INTERBODY ANTERIOR APPROACH  L4-5 L5-S1  WITH POSTERIOR INSTRUMENTATION ;  Surgeon: Haydee Shaw MD;  Location: [facility];  Service: Orthopedics    [place] EXCISION [facility] N/A 8/24/2016    Procedure: Jayme Whitten;  Surgeon: Klever Vargas MD;  [facility];  Service: ENT    [place] PALATOPHAYNGOPLASTY N/A 8/24/2016    Procedure: UVULOPALATOPHARYNGOPLASTY (UPPP);   Surgeon: Klever Vargas MD;  [facility];  Service: ENT    SKIN BIOPSY  2015    R arm, scalp- all benign    SPINAL CORD STIMULATOR IMPLANT      lumbar  in place    SPINAL CORD STIMULATOR REMOVAL      cervical    TONSILLECTOMY      TUBAL LIGATION         Social History     Tobacco Use    Smoking status: Current Every Day Smoker     Packs/day: 1 00     Years: 38 00     Pack years: 38 00    Smokeless tobacco: Never Used   Substance Use Topics    Alcohol use: No    Drug use: No     Frequency: 7 0 times per week     Comment: CBD oil, THC       Family History   Problem Relation Age of Onset    Thyroid cancer Mother     Thyroid cancer Father     Alcohol abuse Sister     No Known Problems Brother     Cancer Family          Above history personally reviewed  EXAM    Vitals:Blood pressure 131/90, pulse (!) 125, temperature 99 5 °F (37 5 °C), resp  rate 16, height 5' 2" (1 575 m), weight 91 6 kg (202 lb), not currently breastfeeding  ,Body mass index is 36 95 kg/m²  Physical Exam  Vitals signs and nursing note reviewed  Constitutional:       Appearance: Normal appearance  She is well-developed and normal weight  HENT:      Head: Normocephalic and atraumatic  Eyes:      Extraocular Movements: Extraocular movements intact  Pupils: Pupils are equal, round, and reactive to light  Neck:      Musculoskeletal: Normal range of motion  Cardiovascular:      Rate and Rhythm: Normal rate  Pulmonary:      Effort: Pulmonary effort is normal  No respiratory distress  Abdominal:      Palpations: Abdomen is soft  Musculoskeletal: Normal range of motion  Skin:     General: Skin is warm and dry  Neurological:      Mental Status: She is alert and oriented to person, place, and time  Gait: Tandem walk abnormal       Deep Tendon Reflexes:      Reflex Scores:       Tricep reflexes are 2+ on the right side and 2+ on the left side  Bicep reflexes are 2+ on the right side and 2+ on the left side  Brachioradialis reflexes are 2+ on the right side and 2+ on the left side  Patellar reflexes are 1+ on the right side and 1+ on the left side    Psychiatric:         Mood and Affect: Mood normal  Speech: Speech normal          Behavior: Behavior normal          Thought Content: Thought content normal          Judgment: Judgment normal          Neurologic Exam     Mental Status   Oriented to person, place, and time  Follows 2 step commands  Attention: normal  Concentration: normal    Speech: speech is normal   Level of consciousness: alert  Knowledge: good  Able to repeat  Normal comprehension  Cranial Nerves   Cranial nerves II through XII intact  CN III, IV, VI   Pupils are equal, round, and reactive to light  Motor Exam   Muscle bulk: normal  Overall muscle tone: increased (BLE)  Right arm pronator drift: absent  Left arm pronator drift: absent    Strength   Strength 5/5 except as noted  4/5 bilateral bi/tri  4/5 left wrist extension  4/5 BLE throughout  Limited cervical extension     Sensory Exam   Decreased sensation bilateral elbows to all fingers, L>R  Paresthesias bilateral fingertips  Decreased sensation bilateral knees to feet with dysesthesias bilateral feet     Gait, Coordination, and Reflexes     Gait  Gait: spastic    Coordination   Tandem walking coordination: abnormal    Tremor   Resting tremor: absent    Reflexes   Right brachioradialis: 2+  Left brachioradialis: 2+  Right biceps: 2+  Left biceps: 2+  Right triceps: 2+  Left triceps: 2+  Right patellar: 1+  Left patellar: 1+  Right Champion: absent  Left Champion: absent  Right ankle clonus: absent  Left ankle clonus: absent        MEDICAL DECISION MAKING    Imaging Studies:     No results found  I have personally reviewed pertinent reports     and I have personally reviewed pertinent films in PACS

## 2021-03-11 NOTE — ASSESSMENT & PLAN NOTE
Pt presents to the neurosurgery office for follow up of worsening neck pain with myelopathic symptoms to review CT cervical spine and discuss surgical options  · Has hx of SCS insertions in cervical and thoracis spine at [facility] in 2019  The SCS in the Cervical spine was removed 3 days later secondary to a CSF leak  · Pt has the thoracic stimulator still  in place which helps her back pain ([address])  · She currently c/o worsening use of her hands, numbness, bilateral neck and shoulder pain/weakness, and difficulty with balance and falls  She denies UI/BI/saddle anesthesia  · She recently started baclofen after see Dr Arian Arreguin    Imaging personally reviewed with attending:  · MRI cervical spine w/wo, 1/18/21: Patient is status post C3-T1 anterior fusion  Disc spacers are noted at C6-7 and C7-T1  Susceptibility artifact from the C7-T1 spacer projects into the spinal canal and there is potentially posterior slippage of the interdiscal component accounting for the susceptibility artifact  Plain film evaluation may be worthwhile to evaluate for posterior slippage  Several sites of myelomalacia in the cervical cord are again noted including slight increase at the T1 level when compared to the prior study  Remaining foci located at C4-5, C5, and C6-7 levels are unchanged  When compared to MRI cervical spine from 2019 at [facility], no significant change was appreciated  · CT cervical spine w/o, 3/8/21: Final read pending  Imaging personally reviewed by Dr Keara Altman today    Plan  · Continue baclofen as instructed and follow up with Dr Arian Arreguin  · We had a lengthy discussion with the patient and she elected to proceed with the cervical SCS as opposed to a baclofen pump right now as she is having significant dysesthesias and the oral baclofen isn't working well  · Risks and benefits of the surgery were discussed in great detail   Due to her extensive surgeries and scar tissue, there is a higher chance of nerve damage and CSF leak  There is a higher change of inability to insert SCS introperatively due to scarring  She understands these risks and would like to proceed  · Her last psychology evaluation was 3-4 years ago for the thoracic SCS; she will obtain these records but a referral was provided today for a new evaluation if needed  · Our office will call to schedule surgery

## 2021-03-30 ENCOUNTER — TRANSCRIBE ORDERS (OUTPATIENT)
Dept: ADMINISTRATIVE | Facility: HOSPITAL | Age: 56
End: 2021-03-30

## 2021-03-30 ENCOUNTER — APPOINTMENT (OUTPATIENT)
Dept: LAB | Facility: IMAGING CENTER | Age: 56
End: 2021-03-30
Payer: MEDICARE

## 2021-03-30 DIAGNOSIS — G89.4 CHRONIC PAIN SYNDROME: ICD-10-CM

## 2021-03-30 DIAGNOSIS — Z13.6 SCREENING FOR CARDIOVASCULAR CONDITION: ICD-10-CM

## 2021-03-30 DIAGNOSIS — M54.2 NECK PAIN: ICD-10-CM

## 2021-03-30 DIAGNOSIS — R23.2 HOT FLASHES: ICD-10-CM

## 2021-03-30 DIAGNOSIS — R79.89 ABNORMAL THYROID SCREEN (BLOOD): ICD-10-CM

## 2021-03-30 DIAGNOSIS — I10 ESSENTIAL HYPERTENSION: ICD-10-CM

## 2021-03-30 DIAGNOSIS — R73.03 PRE-DIABETES: ICD-10-CM

## 2021-03-30 DIAGNOSIS — M54.2 CERVICALGIA: ICD-10-CM

## 2021-03-30 DIAGNOSIS — R25.2 SPASTICITY: ICD-10-CM

## 2021-03-30 DIAGNOSIS — R73.03 PRE-DIABETES: Primary | ICD-10-CM

## 2021-03-30 LAB
ALBUMIN SERPL BCP-MCNC: 3.7 G/DL (ref 3.5–5)
ALP SERPL-CCNC: 74 U/L (ref 46–116)
ALT SERPL W P-5'-P-CCNC: 31 U/L (ref 12–78)
ANION GAP SERPL CALCULATED.3IONS-SCNC: 1 MMOL/L (ref 4–13)
APTT PPP: 29 SECONDS (ref 23–37)
AST SERPL W P-5'-P-CCNC: 27 U/L (ref 5–45)
B-HCG SERPL-ACNC: <2 MIU/ML
BASOPHILS # BLD AUTO: 0.04 THOUSANDS/ΜL (ref 0–0.1)
BASOPHILS NFR BLD AUTO: 1 % (ref 0–1)
BILIRUB SERPL-MCNC: 0.72 MG/DL (ref 0.2–1)
BILIRUB UR QL STRIP: NEGATIVE
BUN SERPL-MCNC: 14 MG/DL (ref 5–25)
CALCIUM SERPL-MCNC: 9.4 MG/DL (ref 8.3–10.1)
CHLORIDE SERPL-SCNC: 107 MMOL/L (ref 100–108)
CHOLEST SERPL-MCNC: 132 MG/DL (ref 50–200)
CLARITY UR: NORMAL
CO2 SERPL-SCNC: 33 MMOL/L (ref 21–32)
COLOR UR: NORMAL
CREAT SERPL-MCNC: 1.07 MG/DL (ref 0.6–1.3)
EOSINOPHIL # BLD AUTO: 0.14 THOUSAND/ΜL (ref 0–0.61)
EOSINOPHIL NFR BLD AUTO: 2 % (ref 0–6)
ERYTHROCYTE [DISTWIDTH] IN BLOOD BY AUTOMATED COUNT: 13.6 % (ref 11.6–15.1)
EST. AVERAGE GLUCOSE BLD GHB EST-MCNC: 111 MG/DL
GFR SERPL CREATININE-BSD FRML MDRD: 59 ML/MIN/1.73SQ M
GLUCOSE P FAST SERPL-MCNC: 110 MG/DL (ref 65–99)
GLUCOSE UR STRIP-MCNC: NEGATIVE MG/DL
HBA1C MFR BLD: 5.5 %
HCT VFR BLD AUTO: 47.1 % (ref 34.8–46.1)
HDLC SERPL-MCNC: 31 MG/DL
HGB BLD-MCNC: 14.3 G/DL (ref 11.5–15.4)
HGB UR QL STRIP.AUTO: NEGATIVE
IMM GRANULOCYTES # BLD AUTO: 0.03 THOUSAND/UL (ref 0–0.2)
IMM GRANULOCYTES NFR BLD AUTO: 0 % (ref 0–2)
INR PPP: 0.93 (ref 0.84–1.19)
KETONES UR STRIP-MCNC: NEGATIVE MG/DL
LDLC SERPL CALC-MCNC: 37 MG/DL (ref 0–100)
LEUKOCYTE ESTERASE UR QL STRIP: NEGATIVE
LYMPHOCYTES # BLD AUTO: 2.81 THOUSANDS/ΜL (ref 0.6–4.47)
LYMPHOCYTES NFR BLD AUTO: 34 % (ref 14–44)
MCH RBC QN AUTO: 28.9 PG (ref 26.8–34.3)
MCHC RBC AUTO-ENTMCNC: 30.4 G/DL (ref 31.4–37.4)
MCV RBC AUTO: 95 FL (ref 82–98)
MONOCYTES # BLD AUTO: 0.57 THOUSAND/ΜL (ref 0.17–1.22)
MONOCYTES NFR BLD AUTO: 7 % (ref 4–12)
NEUTROPHILS # BLD AUTO: 4.71 THOUSANDS/ΜL (ref 1.85–7.62)
NEUTS SEG NFR BLD AUTO: 56 % (ref 43–75)
NITRITE UR QL STRIP: NEGATIVE
NONHDLC SERPL-MCNC: 101 MG/DL
NRBC BLD AUTO-RTO: 0 /100 WBCS
PH UR STRIP.AUTO: 6 [PH]
PLATELET # BLD AUTO: 259 THOUSANDS/UL (ref 149–390)
PMV BLD AUTO: 12.2 FL (ref 8.9–12.7)
POTASSIUM SERPL-SCNC: 4.5 MMOL/L (ref 3.5–5.3)
PROT SERPL-MCNC: 7.8 G/DL (ref 6.4–8.2)
PROT UR STRIP-MCNC: NEGATIVE MG/DL
PROTHROMBIN TIME: 12.5 SECONDS (ref 11.6–14.5)
RBC # BLD AUTO: 4.95 MILLION/UL (ref 3.81–5.12)
SODIUM SERPL-SCNC: 141 MMOL/L (ref 136–145)
SP GR UR STRIP.AUTO: 1.03 (ref 1–1.03)
TRIGL SERPL-MCNC: 319 MG/DL
TSH SERPL DL<=0.05 MIU/L-ACNC: 1.35 UIU/ML (ref 0.36–3.74)
UROBILINOGEN UR QL STRIP.AUTO: 0.2 E.U./DL
WBC # BLD AUTO: 8.3 THOUSAND/UL (ref 4.31–10.16)

## 2021-03-30 PROCEDURE — 81003 URINALYSIS AUTO W/O SCOPE: CPT | Performed by: NEUROLOGICAL SURGERY

## 2021-03-30 PROCEDURE — 36415 COLL VENOUS BLD VENIPUNCTURE: CPT

## 2021-03-30 PROCEDURE — 80061 LIPID PANEL: CPT

## 2021-03-30 PROCEDURE — 83036 HEMOGLOBIN GLYCOSYLATED A1C: CPT

## 2021-03-30 PROCEDURE — 85730 THROMBOPLASTIN TIME PARTIAL: CPT

## 2021-03-30 PROCEDURE — 86901 BLOOD TYPING SEROLOGIC RH(D): CPT

## 2021-03-30 PROCEDURE — 80053 COMPREHEN METABOLIC PANEL: CPT

## 2021-03-30 PROCEDURE — 85610 PROTHROMBIN TIME: CPT

## 2021-03-30 PROCEDURE — 85025 COMPLETE CBC W/AUTO DIFF WBC: CPT

## 2021-03-30 PROCEDURE — 86850 RBC ANTIBODY SCREEN: CPT

## 2021-03-30 PROCEDURE — 86900 BLOOD TYPING SEROLOGIC ABO: CPT

## 2021-03-30 PROCEDURE — 84702 CHORIONIC GONADOTROPIN TEST: CPT

## 2021-03-30 PROCEDURE — 84443 ASSAY THYROID STIM HORMONE: CPT

## 2021-03-31 LAB
ABO GROUP BLD: NORMAL
BLD GP AB SCN SERPL QL: NEGATIVE
RH BLD: POSITIVE
SPECIMEN EXPIRATION DATE: NORMAL

## 2021-04-05 ENCOUNTER — ANESTHESIA EVENT (OUTPATIENT)
Dept: PERIOP | Facility: HOSPITAL | Age: 56
End: 2021-04-05
Payer: MEDICARE

## 2021-04-05 RX ORDER — LISINOPRIL 20 MG/1
20 TABLET ORAL 2 TIMES DAILY
COMMUNITY

## 2021-04-05 RX ORDER — OMEPRAZOLE 40 MG/1
40 CAPSULE, DELAYED RELEASE ORAL
COMMUNITY

## 2021-04-05 NOTE — PRE-PROCEDURE INSTRUCTIONS
Pre-Surgery Instructions:   Medication Instructions    baclofen 10 mg tablet Instructed patient per Anesthesia Guidelines  take am of sx    Citalopram Hydrobromide (CELEXA PO) Instructed patient per Anesthesia Guidelines  take am of sx    DULoxetine (CYMBALTA) 60 mg delayed release capsule Instructed patient per Anesthesia Guidelines  take am of sx    gabapentin (NEURONTIN) 300 mg capsule Instructed patient per Anesthesia Guidelines  take am of sx    lisinopril (ZESTRIL) 20 mg tablet Instructed patient per Anesthesia Guidelines  hold am of sx    omeprazole (PriLOSEC) 40 MG capsule Instructed patient per Anesthesia Guidelines  take am of sx    oxyCODONE-acetaminophen (PERCOCET) 5-325 mg per tablet Instructed patient per Anesthesia Guidelines  take am of sx    propranolol (INDERAL) 80 mg tablet Instructed patient per Anesthesia Guidelines  takes in pm    rOPINIRole (REQUIP) 2 mg tablet Instructed patient per Anesthesia Guidelines  takes in pm    Vitamin D, Cholecalciferol, 50 MCG (2000 UT) CAPS Instructed patient per Anesthesia Guidelines  holding    You will receive a phone call from hospital for arrival time  Please call surgeons office if any changes in your condition  Wear easy on/off clothing; consider type of surgery;  Valuables, jewelry, piercing's please keep at home  **COVID-19  education/surgical guidelines      Please: No contact lenses or eye make up, artificial eyelashes    Please secure transportation     Follow pre surgery showering or cleaning instructions as  Reviewed by nurse or surgeons office      Questions answered and concerns addressed

## 2021-04-08 ENCOUNTER — TELEPHONE (OUTPATIENT)
Dept: NEUROSURGERY | Facility: CLINIC | Age: 56
End: 2021-04-08

## 2021-04-08 ENCOUNTER — DOCUMENTATION (OUTPATIENT)
Dept: NEUROSURGERY | Facility: CLINIC | Age: 56
End: 2021-04-08

## 2021-04-08 DIAGNOSIS — Z79.2 PROPHYLACTIC ANTIBIOTIC: Primary | ICD-10-CM

## 2021-04-08 DIAGNOSIS — Z98.890 POSTOPERATIVE STATE: ICD-10-CM

## 2021-04-08 RX ORDER — DOXYCYCLINE HYCLATE 100 MG/1
100 CAPSULE ORAL EVERY 12 HOURS SCHEDULED
Qty: 20 CAPSULE | Refills: 0 | Status: SHIPPED | OUTPATIENT
Start: 2021-04-08 | End: 2021-04-18

## 2021-04-08 NOTE — TELEPHONE ENCOUNTER
Pre-operative call day prior surgery scheduled in the AM w/ Dr Jose R Raza, LAMINECTOMY APPROACH (N/A Spine Thoracic)    Cervical collar postop    Discussion/Review       Allergies ---Reviewed      Hold medications --- Reviewed  As per hold list     NPO after MN, night prior surgery ---Reviewed as instructed by A[facility] nurse     Medication (s) instructed by healthcare provider to take the morning of surgery w/ sip of water 4 OZ discussed: as instructed by [facility] nurse     Post operative antibiotic electronic transmission to pharmacy:  doxycycline     PDMP site reviewed accessed and reviewed scheduled drug list printed and scanned into record     Pain management script: opiate contract with Dr Petty Schumacher has percocet ,  Treating with baclofen for muscle spasm all over     Pre- operative shower protocol reviewed; Clarify instructions as per protocol, third chlorhexidine shower tonight before surgery, then use JESUSITA wipes as per packaging instructions, Use a clean towel and wash cloth starting tonight and continue nightly until seen 2 weeks post-operative visit for incision check removal  Change bed linens tonight and continue at least 1-2 times weekly  Informed will receive a telephone call tonight from a hospital representative with time to report on surgery day:  0830-    Informed will receive a f/u call within in 24 -48 hours post-op to assess recovery reinforce instructions, and to answer any questions  Monday     Follow-up appointments reviewed: documented in discharge paper work 2 week      6 week     4/29/2021 3:00 PM (Arrive by 2:45 PM) Gabe Eason, [facility]   5/27/2021 3:30 PM (Arrive by 3:15 PM) MD Cecy Barba [facility]       Patient verbalized understanding information provided /discussed         Call product rep 1-2 days weeks prior postop appointment with reminder of your appointment

## 2021-04-09 ENCOUNTER — ANESTHESIA (OUTPATIENT)
Dept: PERIOP | Facility: HOSPITAL | Age: 56
End: 2021-04-09
Payer: MEDICARE

## 2021-04-09 ENCOUNTER — APPOINTMENT (OUTPATIENT)
Dept: RADIOLOGY | Facility: HOSPITAL | Age: 56
End: 2021-04-09
Payer: MEDICARE

## 2021-04-09 ENCOUNTER — HOSPITAL ENCOUNTER (OUTPATIENT)
Facility: HOSPITAL | Age: 56
Setting detail: OUTPATIENT SURGERY
Discharge: HOME | End: 2021-04-09
Attending: NEUROLOGICAL SURGERY | Admitting: NEUROLOGICAL SURGERY
Payer: MEDICARE

## 2021-04-09 VITALS
HEART RATE: 69 BPM | RESPIRATION RATE: 18 BRPM | TEMPERATURE: 97.3 F | DIASTOLIC BLOOD PRESSURE: 70 MMHG | WEIGHT: 208.2 LBS | OXYGEN SATURATION: 96 % | BODY MASS INDEX: 38.08 KG/M2 | SYSTOLIC BLOOD PRESSURE: 154 MMHG

## 2021-04-09 LAB
EXT PREGNANCY TEST URINE: NEGATIVE
EXT. CONTROL: NORMAL

## 2021-04-09 PROCEDURE — 63650 IMPLANT NEUROELECTRODES: CPT | Performed by: NEUROLOGICAL SURGERY

## 2021-04-09 PROCEDURE — 63650 IMPLANT NEUROELECTRODES: CPT | Performed by: PHYSICIAN ASSISTANT

## 2021-04-09 PROCEDURE — C1883 ADAPT/EXT, PACING/NEURO LEAD: HCPCS | Performed by: NEUROLOGICAL SURGERY

## 2021-04-09 PROCEDURE — 72040 X-RAY EXAM NECK SPINE 2-3 VW: CPT

## 2021-04-09 PROCEDURE — C1713 ANCHOR/SCREW BN/BN,TIS/BN: HCPCS | Performed by: NEUROLOGICAL SURGERY

## 2021-04-09 PROCEDURE — 81025 URINE PREGNANCY TEST: CPT | Performed by: NEUROLOGICAL SURGERY

## 2021-04-09 PROCEDURE — C1778 LEAD, NEUROSTIMULATOR: HCPCS | Performed by: NEUROLOGICAL SURGERY

## 2021-04-09 PROCEDURE — 95972 ALYS CPLX SP/PN NPGT W/PRGRM: CPT | Performed by: NEUROLOGICAL SURGERY

## 2021-04-09 DEVICE — 74CM 8 CONTACT LEAD KIT
Type: IMPLANTABLE DEVICE | Site: SPINE CERVICAL | Status: FUNCTIONAL
Brand: AVISTA™ MRI

## 2021-04-09 DEVICE — 35CM 8 CONTACT EXTENSION KIT: Type: IMPLANTABLE DEVICE | Site: BACK | Status: FUNCTIONAL

## 2021-04-09 DEVICE — ANCHOR
Type: IMPLANTABLE DEVICE | Site: SPINE CERVICAL | Status: FUNCTIONAL
Brand: CLIK™ X MRI

## 2021-04-09 RX ORDER — PROPOFOL 10 MG/ML
INJECTION, EMULSION INTRAVENOUS CONTINUOUS PRN
Status: DISCONTINUED | OUTPATIENT
Start: 2021-04-09 | End: 2021-04-09

## 2021-04-09 RX ORDER — BUPIVACAINE HYDROCHLORIDE 2.5 MG/ML
INJECTION, SOLUTION EPIDURAL; INFILTRATION; INTRACAUDAL AS NEEDED
Status: DISCONTINUED | OUTPATIENT
Start: 2021-04-09 | End: 2021-04-09 | Stop reason: HOSPADM

## 2021-04-09 RX ORDER — LIDOCAINE HYDROCHLORIDE AND EPINEPHRINE 10; 10 MG/ML; UG/ML
INJECTION, SOLUTION INFILTRATION; PERINEURAL AS NEEDED
Status: DISCONTINUED | OUTPATIENT
Start: 2021-04-09 | End: 2021-04-09 | Stop reason: HOSPADM

## 2021-04-09 RX ORDER — DEXAMETHASONE SODIUM PHOSPHATE 4 MG/ML
INJECTION, SOLUTION INTRA-ARTICULAR; INTRALESIONAL; INTRAMUSCULAR; INTRAVENOUS; SOFT TISSUE AS NEEDED
Status: DISCONTINUED | OUTPATIENT
Start: 2021-04-09 | End: 2021-04-09

## 2021-04-09 RX ORDER — SODIUM CHLORIDE, SODIUM LACTATE, POTASSIUM CHLORIDE, CALCIUM CHLORIDE 600; 310; 30; 20 MG/100ML; MG/100ML; MG/100ML; MG/100ML
INJECTION, SOLUTION INTRAVENOUS CONTINUOUS PRN
Status: DISCONTINUED | OUTPATIENT
Start: 2021-04-09 | End: 2021-04-09

## 2021-04-09 RX ORDER — ROCURONIUM BROMIDE 10 MG/ML
INJECTION, SOLUTION INTRAVENOUS AS NEEDED
Status: DISCONTINUED | OUTPATIENT
Start: 2021-04-09 | End: 2021-04-09

## 2021-04-09 RX ORDER — FENTANYL CITRATE 50 UG/ML
INJECTION, SOLUTION INTRAMUSCULAR; INTRAVENOUS AS NEEDED
Status: DISCONTINUED | OUTPATIENT
Start: 2021-04-09 | End: 2021-04-09

## 2021-04-09 RX ORDER — CEFAZOLIN SODIUM 2 G/50ML
SOLUTION INTRAVENOUS AS NEEDED
Status: DISCONTINUED | OUTPATIENT
Start: 2021-04-09 | End: 2021-04-09

## 2021-04-09 RX ORDER — ONDANSETRON 2 MG/ML
4 INJECTION INTRAMUSCULAR; INTRAVENOUS ONCE
Status: DISCONTINUED | OUTPATIENT
Start: 2021-04-09 | End: 2021-04-09 | Stop reason: HOSPADM

## 2021-04-09 RX ORDER — PROPOFOL 10 MG/ML
INJECTION, EMULSION INTRAVENOUS AS NEEDED
Status: DISCONTINUED | OUTPATIENT
Start: 2021-04-09 | End: 2021-04-09

## 2021-04-09 RX ORDER — CEFAZOLIN SODIUM 2 G/50ML
2000 SOLUTION INTRAVENOUS ONCE
Status: DISCONTINUED | OUTPATIENT
Start: 2021-04-09 | End: 2021-04-09 | Stop reason: HOSPADM

## 2021-04-09 RX ORDER — MIDAZOLAM HYDROCHLORIDE 2 MG/2ML
INJECTION, SOLUTION INTRAMUSCULAR; INTRAVENOUS AS NEEDED
Status: DISCONTINUED | OUTPATIENT
Start: 2021-04-09 | End: 2021-04-09

## 2021-04-09 RX ORDER — FENTANYL CITRATE/PF 50 MCG/ML
25 SYRINGE (ML) INJECTION
Status: DISCONTINUED | OUTPATIENT
Start: 2021-04-09 | End: 2021-04-09 | Stop reason: HOSPADM

## 2021-04-09 RX ORDER — ONDANSETRON 2 MG/ML
INJECTION INTRAMUSCULAR; INTRAVENOUS AS NEEDED
Status: DISCONTINUED | OUTPATIENT
Start: 2021-04-09 | End: 2021-04-09

## 2021-04-09 RX ORDER — HYDROMORPHONE HCL/PF 1 MG/ML
0.5 SYRINGE (ML) INJECTION
Status: DISCONTINUED | OUTPATIENT
Start: 2021-04-09 | End: 2021-04-09 | Stop reason: HOSPADM

## 2021-04-09 RX ORDER — OXYCODONE HYDROCHLORIDE 5 MG/1
5 TABLET ORAL EVERY 4 HOURS PRN
Status: DISCONTINUED | OUTPATIENT
Start: 2021-04-09 | End: 2021-04-09 | Stop reason: HOSPADM

## 2021-04-09 RX ORDER — CHLORHEXIDINE GLUCONATE 0.12 MG/ML
15 RINSE ORAL ONCE
Status: COMPLETED | OUTPATIENT
Start: 2021-04-09 | End: 2021-04-09

## 2021-04-09 RX ORDER — EPHEDRINE SULFATE 50 MG/ML
INJECTION INTRAVENOUS AS NEEDED
Status: DISCONTINUED | OUTPATIENT
Start: 2021-04-09 | End: 2021-04-09

## 2021-04-09 RX ORDER — SUCCINYLCHOLINE/SOD CL,ISO/PF 100 MG/5ML
SYRINGE (ML) INTRAVENOUS AS NEEDED
Status: DISCONTINUED | OUTPATIENT
Start: 2021-04-09 | End: 2021-04-09

## 2021-04-09 RX ADMIN — SODIUM CHLORIDE, SODIUM LACTATE, POTASSIUM CHLORIDE, AND CALCIUM CHLORIDE: .6; .31; .03; .02 INJECTION, SOLUTION INTRAVENOUS at 10:41

## 2021-04-09 RX ADMIN — SODIUM CHLORIDE, SODIUM LACTATE, POTASSIUM CHLORIDE, AND CALCIUM CHLORIDE: .6; .31; .03; .02 INJECTION, SOLUTION INTRAVENOUS at 09:37

## 2021-04-09 RX ADMIN — PROPOFOL 150 MCG/KG/MIN: 10 INJECTION, EMULSION INTRAVENOUS at 10:05

## 2021-04-09 RX ADMIN — DEXAMETHASONE SODIUM PHOSPHATE 4 MG: 4 INJECTION, SOLUTION INTRAMUSCULAR; INTRAVENOUS at 10:10

## 2021-04-09 RX ADMIN — EPHEDRINE SULFATE 10 MG: 50 INJECTION, SOLUTION INTRAVENOUS at 10:41

## 2021-04-09 RX ADMIN — ONDANSETRON 4 MG: 2 INJECTION INTRAMUSCULAR; INTRAVENOUS at 10:33

## 2021-04-09 RX ADMIN — FENTANYL CITRATE 100 MCG: 50 INJECTION, SOLUTION INTRAMUSCULAR; INTRAVENOUS at 09:45

## 2021-04-09 RX ADMIN — EPHEDRINE SULFATE 10 MG: 50 INJECTION, SOLUTION INTRAVENOUS at 10:35

## 2021-04-09 RX ADMIN — FENTANYL CITRATE 50 MCG: 50 INJECTION, SOLUTION INTRAMUSCULAR; INTRAVENOUS at 10:27

## 2021-04-09 RX ADMIN — CHLORHEXIDINE GLUCONATE 0.12% ORAL RINSE 15 ML: 1.2 LIQUID ORAL at 08:58

## 2021-04-09 RX ADMIN — CEFAZOLIN SODIUM 2000 MG: 2 SOLUTION INTRAVENOUS at 09:49

## 2021-04-09 RX ADMIN — ROCURONIUM BROMIDE 5 MG: 10 INJECTION, SOLUTION INTRAVENOUS at 09:45

## 2021-04-09 RX ADMIN — MIDAZOLAM 2 MG: 1 INJECTION INTRAMUSCULAR; INTRAVENOUS at 09:40

## 2021-04-09 RX ADMIN — Medication 100 MG: at 09:45

## 2021-04-09 RX ADMIN — PHENYLEPHRINE HYDROCHLORIDE 100 MCG: 10 INJECTION INTRAVENOUS at 10:11

## 2021-04-09 RX ADMIN — FENTANYL CITRATE 50 MCG: 50 INJECTION, SOLUTION INTRAMUSCULAR; INTRAVENOUS at 09:54

## 2021-04-09 RX ADMIN — PROPOFOL 200 MG: 10 INJECTION, EMULSION INTRAVENOUS at 09:45

## 2021-04-09 NOTE — DISCHARGE INSTRUCTIONS
Discharge Instructions  Spinal Cord Stimulator (SCS)  Activity:  1  Do not lift more than 10 pounds for 6 weeks  2  Avoid bending, lifting and twisting for 6 weeks  3  No strenuous activities  No driving for 2 weeks  4  When able to shower, continue to use clean towel and washcloth for 2 weeks post-op  5  Continue to change bed linens and pajamas more frequently  Wear clean clothes daily  6  Use soft cervical collar  Surgical incision care: For Permanent SCS placement:   1  Keep incisions dry for 3 days  2  May shower with mild antimicrobial soap after 3 days  3  After 3 days, incisions may be left open to air, but must remain clean  For Trial SCS placement:   1  Wires and stimulator will be secured with a dressing  Do NOT remove  Keep dressing DRY  2  No showering with Trial in place  Sponge bath only  Do not immerse the incisions in water for 6 weeks  Do not apply any creams or ointments to the incision for 6 weeks, unless otherwise instructed by [facility]  Contact office if increasing redness, drainage, pain or swelling around the incisions  Postoperative medication:  Complete course of antibiotic as directed  1  For permanent spinal cord stimulators: 3 days   2  For spinal cord stimulator trials: Continue for duration of trial and 3 days after leads are pulled unless directed otherwise  [address] will provide pain medication as coordinated with your pain specialist  All prescriptions must come from a single provider  1  Take all medications as prescribed  Call office with any questions/concerns  Please contact office for questions regarding dosage and modifications  No antiplatelet, anticoagulation or Nonsteroidal anti-inflammatory (NSAIDs) medication until cleared by [address], unless otherwise instructed  Do not operate heavy machinery or vehicles while taking sedating medications     Use a bowel regimen while on opioids as they induce constipation  Ie  Senokot-S, Miralax, Colace, etc  Increase fiber and water intake  ***Note that it may take some time for the stimulator to improve chronic pain symptoms  Adjustment of the stimulator program can begin 2 weeks after placement  For TRIALS, there will be ongoing communication with the rep and adjustments as indicated  Please contact the stimulator representative if you have any questions regarding programing  ***

## 2021-04-09 NOTE — INTERVAL H&P NOTE
H&P reviewed  After examining the patient I find no changes in the patients condition since the H&P had been written      Vitals:    04/09/21 0846   BP: (!) 176/82   Pulse: 74   Resp: 20   Temp: 99 1 °F (37 3 °C)   SpO2: 100%

## 2021-04-09 NOTE — ANESTHESIA PREPROCEDURE EVALUATION
Procedure:  INSERTION CERVICAL DORSAL COLUMN SPINAL CORD STIMULATOR PERCUTANEOUS PERMANENT TRIAL, LAMINECTOMY APPROACH (N/A Spine Thoracic)    Relevant Problems   MUSCULOSKELETAL   (+) Back pain      NEURO/PSYCH   (+) Chronic pain syndrome   (+) History of arthritis   (+) History of gastrointestinal disease   (+) History of hypertension      PULMONARY   (+) Obstructive sleep apnea syndrome      Other   (+) Neck pain        Physical Exam    Airway    Mallampati score: II  TM Distance: >3 FB  Neck ROM: limited     Dental   upper dentures and lower dentures,     Cardiovascular  Cardiovascular exam normal    Pulmonary  Pulmonary exam normal     Other Findings        Anesthesia Plan  ASA Score- 3     Anesthesia Type- general with ASA Monitors  Additional Monitors:   Airway Plan: ETT  Plan Factors-    Chart reviewed  Patient summary reviewed  Patient is a current smoker  Patient smoked on day of surgery  Induction- intravenous  Postoperative Plan- Plan for postoperative opioid use  Informed Consent- Anesthetic plan and risks discussed with patient and spouse  I personally reviewed this patient with the CRNA  Discussed and agreed on the Anesthesia Plan with the CRNA  Jewel Marshall

## 2021-04-09 NOTE — ANESTHESIA POSTPROCEDURE EVALUATION
Post-Op Assessment Note    CV Status:  Stable  Pain Score: 0    Pain management: adequate     Mental Status:  Sleepy and arousable   Hydration Status:  Euvolemic   PONV Controlled:  Controlled   Airway Patency:  Patent  Airway: intubated      Post Op Vitals Reviewed: Yes      Staff: CRNA         No complications documented      BP     Temp      Pulse     Resp      SpO2

## 2021-04-09 NOTE — OP NOTE
OPERATIVE REPORT  PATIENT NAME: Cheyanne Chen    :  1965  MRN: [id number]  Pt Location: [facility] OR ROOM 03    SURGERY DATE: 2021    Surgeon(s) and Role:     * Nubia Sarmiento MD - Primary     * Geeta Woodard PA-C - Assisting    Preop Diagnosis:  Chronic pain syndrome [G89 4]  Cervical radiculopathy    Post-Op Diagnosis Codes:     * Chronic pain syndrome [G89 4]  Cervical radiculopathy    Procedure(s) (LRB):  INSERTION CERVICAL DORSAL COLUMN SPINAL CORD STIMULATOR PERCUTANEOUS PERMANENT TRIAL (N/A)    Specimen(s):  * No specimens in log *    Estimated Blood Loss:   Minimal    Drains:  * No LDAs found *    Anesthesia Type:   General    Operative Indications:  Chronic pain syndrome [G89 4]  Cervical radiculopathy      Operative Findings:  See dictated note  [employer]  x2 avista leads  Mri compatible click x anchor x2    Complications:   None    Procedure and Technique:  The patient was taken to operative theater induced under general anesthesia and intubated she was positioned prone a Mateusz frame  The sweet spot during the trial was planned at C2-3  An midline upper thoracic incision was planned for the generator she was prepped and draped in sterile fashion  We made an incision with a 10 blade and performed electrocautery   We dissected down subperiosteally until we exposed the lamina and interspinous space in the upper thoracic region  We used an epidmed needle to gain epidural access via loss of resistance technique  We traveled two electrodes through two different epidural access needles up to towards approximately C2-3 on the left and right  We confirmed placement of the the lead based on fluoroscopy  We tested the EMG response by altering the following parameters using a neurostimulation device   We programmed the following:    Frequency: 10-60 hertz   Pulse width of 350 us  Amplitudes: 0-10 mA   Contacts: midline contacts alternating contacts on the 16 contact electrode  Configuration: Bipolar with (+) and (-)  Cycling: None  Waveform: Tonic    We obtained the appropriate EMG response on the right and left arms    We then tunneled out the skin through a separate stab incision on the left  We then tested impedances which were normal     The leads been anchored down using anchors under serial of fluoroscopy  Then closed in layers using 2 0 Vicryl for the subcutaneous tissues and staples for the skin sterile dressing was placed  Patient was repositioned supine the hospital bed extubated to room air  She was taken to the postop recovery area stable condition  All needle and sponge counts were correct at the procedure  All neuromonitoring remained stable during the procedure      I was present for the entire procedure, A qualified resident physician was not available and A physician assistant was required during the procedure for retraction tissue handling,dissection and suturing    Patient Disposition:  PACU     SIGNATURE: Lisa Menchaca MD  DATE: April 9, 2021  TIME: 10:52 AM

## 2021-04-09 NOTE — ANESTHESIA POSTPROCEDURE EVALUATION
Post-Op Assessment Note             Reason for prolonged intubation > 24 hours:  N/aReason for prolonged intubation > 48 hours:  N/a      No complications documented      BP      Temp     Pulse     Resp      SpO2

## 2021-04-12 ENCOUNTER — TELEPHONE (OUTPATIENT)
Dept: NEUROSURGERY | Facility: CLINIC | Age: 56
End: 2021-04-12

## 2021-04-12 NOTE — TELEPHONE ENCOUNTER
Called patient to see how she is doing after surgery- cervical SCS permanent trial  Patient reports she is doing well overall and denies any incisional issues or fevers  She does report having a lot of surgical pain-mainly in her neck and across her shoulders  Patient is able to ambulate around the house and complete ADLs with assistance  She is happy to report that she has been able to sleep with her arms down, which she has not been able to do for a very long time without shooting pain  She also has some improved sensation and feeling in her fingers and reports being able to "feel the ridges on the paper towel" which she couldn't do before  Educated the patient about the importance of preventing blood clots and provided measures how to prevent them  Patient has moved her bowels since the surgery  Encouraged patient to take an over the counter stool softener, if she is taking narcotic pain medication  Encouraged fiber intake and fluids  Reviewed incision care with the patient  She is having her  keep an eye on the bandage-no bleeding reported at this time  He is to keep the area dry  Do not submerge in water until cleared by the surgeon  Do not apply any creams, ointments, or lotions to the site  Patient is aware to call the office if any redness, swelling, drainage, dehiscence of incision, or fever >100 F occurs  Patient is aware to call the office if any concerns or questions may arise  Reminded patient of her upcoming surgery scheduled on 4/16/21 with Dr Megan Cordoba  Patient appreciative for the call

## 2021-04-15 ENCOUNTER — DOCUMENTATION (OUTPATIENT)
Dept: NEUROSURGERY | Facility: CLINIC | Age: 56
End: 2021-04-15

## 2021-04-15 ENCOUNTER — ANESTHESIA EVENT (OUTPATIENT)
Dept: PERIOP | Facility: HOSPITAL | Age: 56
End: 2021-04-15
Payer: MEDICARE

## 2021-04-15 ENCOUNTER — TELEPHONE (OUTPATIENT)
Dept: NEUROSURGERY | Facility: CLINIC | Age: 56
End: 2021-04-15

## 2021-04-15 NOTE — TELEPHONE ENCOUNTER
Pre-operative call day prior surgery scheduled in the AM w/ Dr Jaycee Myers, OR REMOVAL OF EXTENSIONS AND CONNECTION TO GENERATOR (N/A Buttocks)    Last week 4/9/2021 INSERTION CERVICAL DORSAL COLUMN SPINAL CORD STIMULATOR PERCUTANEOUS PERMANENT TRIAL (N/A Spine Thoracic)  Reports 60 % efficacy for reduction in chronic pain decreased shooting radicular pain in right upper extremity into fingers   Improved sleep , slept longer  No longer awake with shooting pain into fingers, no heaviness in head requiring frequent resting, no longer having sensation of  of heaviness in bilateral shoulders  No longer rubber band sensation  Around breast   Did not take oxycodone all week , "endured pain was a difficult week"  Discussion/Review       Allergies ---Reviewed      Hold medications --- Reviewed      NPO after MN, night prior surgery ---Reviewed as instructed by ASU nurse     Medication (s) instructed by healthcare provider to take the morning of surgery w/ sip of water 4 OZ discussed: as instructed by ASU nurse     Post operative antibiotic electronic transmission to pharmacy:  Continue doxycycline as ordered 4/9/21 for 10 days     PDMP site reviewed accessed and reviewed scheduled drug list printed and scanned into record     Pain management script: continue percocet  5-325 (2) every 6 hours  , prescribed by pain management    Pre- operative shower protocol reviewed; Clarify instructions as per protocol, third chlorhexidine shower tonight before surgery, then use JESUSITA wipes as per packaging instructions, Use a clean towel and wash cloth starting tonight and continue nightly until seen 2 weeks post-operative visit for incision check removal  Change bed linens tonight and continue at least 1-2 times weekly  ---sponge bath      Informed will receive a telephone call tonight from a hospital representative with time to report on surgery day:  12:30     Informed will receive a f/u call within in 24 -48 hours post-op to assess recovery reinforce instructions, and to answer any questions  Monday     Follow-up appointments reviewed: documented in discharge paper work 2 week      6 week       4/29/2021 3:00 PM (Arrive by 2:45 PM) Don Neal, [address]   5/27/2021 3:30 PM (Arrive by 3:15 PM) Radha Witt MD [address]     Patient verbalized understanding information provided /discussed  Call product rep 1-2 days weeks prior postop appointment with reminder of your appointment     Future plan for baclofen intrathecal pump insertion by Dr Subhash Mcfarland  was evaluated by Dr Nicole Greco who started Baclofen po   Told need a Dr to manage pump once inserted

## 2021-04-16 ENCOUNTER — ANESTHESIA (OUTPATIENT)
Dept: PERIOP | Facility: HOSPITAL | Age: 56
End: 2021-04-16
Payer: MEDICARE

## 2021-04-16 ENCOUNTER — HOSPITAL ENCOUNTER (OUTPATIENT)
Facility: HOSPITAL | Age: 56
Setting detail: OUTPATIENT SURGERY
Discharge: HOME | End: 2021-04-16
Attending: NEUROLOGICAL SURGERY | Admitting: NEUROLOGICAL SURGERY
Payer: MEDICARE

## 2021-04-16 VITALS
RESPIRATION RATE: 18 BRPM | OXYGEN SATURATION: 97 % | HEART RATE: 62 BPM | TEMPERATURE: 97.8 F | SYSTOLIC BLOOD PRESSURE: 137 MMHG | DIASTOLIC BLOOD PRESSURE: 82 MMHG

## 2021-04-16 PROBLEM — M79.7 FIBROMYALGIA: Status: ACTIVE | Noted: 2021-04-16

## 2021-04-16 PROBLEM — I10 HTN (HYPERTENSION): Status: ACTIVE | Noted: 2021-04-16

## 2021-04-16 PROBLEM — E66.9 OBESITY (BMI 30-39.9): Status: ACTIVE | Noted: 2021-04-16

## 2021-04-16 PROBLEM — F17.200 SMOKING: Status: ACTIVE | Noted: 2021-04-16

## 2021-04-16 PROBLEM — F41.9 ANXIETY: Status: ACTIVE | Noted: 2021-04-16

## 2021-04-16 PROBLEM — F32.A DEPRESSION: Status: ACTIVE | Noted: 2021-04-16

## 2021-04-16 PROCEDURE — NC001 PR NO CHARGE: Performed by: NEUROLOGICAL SURGERY

## 2021-04-16 PROCEDURE — 63663 REVISE SPINE ELTRD PERQ ARAY: CPT | Performed by: NEUROLOGICAL SURGERY

## 2021-04-16 PROCEDURE — C1820 GENERATOR NEURO RECHG BAT SY: HCPCS | Performed by: NEUROLOGICAL SURGERY

## 2021-04-16 PROCEDURE — 63685 INS/RPLC SPI NPG/RCVR POCKET: CPT | Performed by: NEUROLOGICAL SURGERY

## 2021-04-16 PROCEDURE — NC001 PR NO CHARGE: Performed by: PHYSICIAN ASSISTANT

## 2021-04-16 DEVICE — IMPLANTABLE PULSE GENERATOR KIT
Type: IMPLANTABLE DEVICE | Status: FUNCTIONAL
Brand: PRECISION™ MONTAGE™ MRI

## 2021-04-16 RX ORDER — FENTANYL CITRATE/PF 50 MCG/ML
25 SYRINGE (ML) INJECTION
Status: DISCONTINUED | OUTPATIENT
Start: 2021-04-16 | End: 2021-04-16 | Stop reason: HOSPADM

## 2021-04-16 RX ORDER — CHLORHEXIDINE GLUCONATE 0.12 MG/ML
15 RINSE ORAL ONCE
Status: COMPLETED | OUTPATIENT
Start: 2021-04-16 | End: 2021-04-16

## 2021-04-16 RX ORDER — OXYCODONE HYDROCHLORIDE 5 MG/1
5 TABLET ORAL EVERY 4 HOURS PRN
Status: DISCONTINUED | OUTPATIENT
Start: 2021-04-16 | End: 2021-04-16 | Stop reason: HOSPADM

## 2021-04-16 RX ORDER — GLYCOPYRROLATE 0.2 MG/ML
INJECTION INTRAMUSCULAR; INTRAVENOUS AS NEEDED
Status: DISCONTINUED | OUTPATIENT
Start: 2021-04-16 | End: 2021-04-16

## 2021-04-16 RX ORDER — LIDOCAINE HYDROCHLORIDE AND EPINEPHRINE 10; 10 MG/ML; UG/ML
INJECTION, SOLUTION INFILTRATION; PERINEURAL AS NEEDED
Status: DISCONTINUED | OUTPATIENT
Start: 2021-04-16 | End: 2021-04-16 | Stop reason: HOSPADM

## 2021-04-16 RX ORDER — MIDAZOLAM HYDROCHLORIDE 2 MG/2ML
INJECTION, SOLUTION INTRAMUSCULAR; INTRAVENOUS AS NEEDED
Status: DISCONTINUED | OUTPATIENT
Start: 2021-04-16 | End: 2021-04-16

## 2021-04-16 RX ORDER — ROCURONIUM BROMIDE 10 MG/ML
INJECTION, SOLUTION INTRAVENOUS AS NEEDED
Status: DISCONTINUED | OUTPATIENT
Start: 2021-04-16 | End: 2021-04-16

## 2021-04-16 RX ORDER — SODIUM CHLORIDE, SODIUM LACTATE, POTASSIUM CHLORIDE, CALCIUM CHLORIDE 600; 310; 30; 20 MG/100ML; MG/100ML; MG/100ML; MG/100ML
INJECTION, SOLUTION INTRAVENOUS CONTINUOUS PRN
Status: DISCONTINUED | OUTPATIENT
Start: 2021-04-16 | End: 2021-04-16

## 2021-04-16 RX ORDER — HYDROMORPHONE HCL IN WATER/PF 6 MG/30 ML
0.2 PATIENT CONTROLLED ANALGESIA SYRINGE INTRAVENOUS
Status: DISCONTINUED | OUTPATIENT
Start: 2021-04-16 | End: 2021-04-16 | Stop reason: HOSPADM

## 2021-04-16 RX ORDER — MEPERIDINE HYDROCHLORIDE 25 MG/ML
12.5 INJECTION INTRAMUSCULAR; INTRAVENOUS; SUBCUTANEOUS
Status: DISCONTINUED | OUTPATIENT
Start: 2021-04-16 | End: 2021-04-16 | Stop reason: HOSPADM

## 2021-04-16 RX ORDER — LABETALOL 20 MG/4 ML (5 MG/ML) INTRAVENOUS SYRINGE
5
Status: DISCONTINUED | OUTPATIENT
Start: 2021-04-16 | End: 2021-04-16 | Stop reason: HOSPADM

## 2021-04-16 RX ORDER — PROPOFOL 10 MG/ML
INJECTION, EMULSION INTRAVENOUS AS NEEDED
Status: DISCONTINUED | OUTPATIENT
Start: 2021-04-16 | End: 2021-04-16

## 2021-04-16 RX ORDER — HYDROMORPHONE HCL/PF 1 MG/ML
0.5 SYRINGE (ML) INJECTION
Status: DISCONTINUED | OUTPATIENT
Start: 2021-04-16 | End: 2021-04-16 | Stop reason: HOSPADM

## 2021-04-16 RX ORDER — FENTANYL CITRATE 50 UG/ML
INJECTION, SOLUTION INTRAMUSCULAR; INTRAVENOUS AS NEEDED
Status: DISCONTINUED | OUTPATIENT
Start: 2021-04-16 | End: 2021-04-16

## 2021-04-16 RX ORDER — HYDRALAZINE HYDROCHLORIDE 20 MG/ML
5 INJECTION INTRAMUSCULAR; INTRAVENOUS
Status: DISCONTINUED | OUTPATIENT
Start: 2021-04-16 | End: 2021-04-16 | Stop reason: HOSPADM

## 2021-04-16 RX ORDER — ONDANSETRON 2 MG/ML
INJECTION INTRAMUSCULAR; INTRAVENOUS AS NEEDED
Status: DISCONTINUED | OUTPATIENT
Start: 2021-04-16 | End: 2021-04-16

## 2021-04-16 RX ORDER — PROMETHAZINE HYDROCHLORIDE 25 MG/ML
6.25 INJECTION, SOLUTION INTRAMUSCULAR; INTRAVENOUS ONCE AS NEEDED
Status: DISCONTINUED | OUTPATIENT
Start: 2021-04-16 | End: 2021-04-16 | Stop reason: HOSPADM

## 2021-04-16 RX ORDER — ONDANSETRON 2 MG/ML
4 INJECTION INTRAMUSCULAR; INTRAVENOUS ONCE AS NEEDED
Status: DISCONTINUED | OUTPATIENT
Start: 2021-04-16 | End: 2021-04-16 | Stop reason: HOSPADM

## 2021-04-16 RX ORDER — METOCLOPRAMIDE HYDROCHLORIDE 5 MG/ML
10 INJECTION INTRAMUSCULAR; INTRAVENOUS ONCE AS NEEDED
Status: DISCONTINUED | OUTPATIENT
Start: 2021-04-16 | End: 2021-04-16 | Stop reason: HOSPADM

## 2021-04-16 RX ORDER — NEOSTIGMINE METHYLSULFATE 1 MG/ML
INJECTION INTRAVENOUS AS NEEDED
Status: DISCONTINUED | OUTPATIENT
Start: 2021-04-16 | End: 2021-04-16

## 2021-04-16 RX ORDER — CEFAZOLIN SODIUM 2 G/50ML
2000 SOLUTION INTRAVENOUS ONCE
Status: COMPLETED | OUTPATIENT
Start: 2021-04-16 | End: 2021-04-16

## 2021-04-16 RX ADMIN — FENTANYL CITRATE 100 MCG: 50 INJECTION, SOLUTION INTRAMUSCULAR; INTRAVENOUS at 13:54

## 2021-04-16 RX ADMIN — CEFAZOLIN SODIUM 2000 MG: 2 SOLUTION INTRAVENOUS at 14:08

## 2021-04-16 RX ADMIN — NEOSTIGMINE METHYLSULFATE 4 MG: 1 INJECTION, SOLUTION INTRAVENOUS at 14:32

## 2021-04-16 RX ADMIN — GLYCOPYRROLATE 0.6 MG: 0.2 INJECTION, SOLUTION INTRAMUSCULAR; INTRAVENOUS at 14:32

## 2021-04-16 RX ADMIN — FENTANYL CITRATE 25 MCG: 50 INJECTION, SOLUTION INTRAMUSCULAR; INTRAVENOUS at 15:20

## 2021-04-16 RX ADMIN — ONDANSETRON 4 MG: 2 INJECTION INTRAMUSCULAR; INTRAVENOUS at 14:26

## 2021-04-16 RX ADMIN — PHENYLEPHRINE HYDROCHLORIDE 100 MCG: 10 INJECTION INTRAVENOUS at 14:12

## 2021-04-16 RX ADMIN — SODIUM CHLORIDE, SODIUM LACTATE, POTASSIUM CHLORIDE, AND CALCIUM CHLORIDE: .6; .31; .03; .02 INJECTION, SOLUTION INTRAVENOUS at 13:50

## 2021-04-16 RX ADMIN — MIDAZOLAM 2 MG: 1 INJECTION INTRAMUSCULAR; INTRAVENOUS at 13:50

## 2021-04-16 RX ADMIN — CHLORHEXIDINE GLUCONATE 0.12% ORAL RINSE 15 ML: 1.2 LIQUID ORAL at 13:04

## 2021-04-16 RX ADMIN — HYDROMORPHONE HYDROCHLORIDE 0.5 MG: 1 INJECTION, SOLUTION INTRAMUSCULAR; INTRAVENOUS; SUBCUTANEOUS at 15:38

## 2021-04-16 RX ADMIN — FENTANYL CITRATE 25 MCG: 50 INJECTION, SOLUTION INTRAMUSCULAR; INTRAVENOUS at 15:09

## 2021-04-16 RX ADMIN — HYDROMORPHONE HYDROCHLORIDE 0.2 MG: 0.2 INJECTION, SOLUTION INTRAMUSCULAR; INTRAVENOUS; SUBCUTANEOUS at 15:28

## 2021-04-16 RX ADMIN — ROCURONIUM BROMIDE 50 MG: 10 INJECTION, SOLUTION INTRAVENOUS at 13:55

## 2021-04-16 RX ADMIN — PROPOFOL 150 MG: 10 INJECTION, EMULSION INTRAVENOUS at 13:54

## 2021-04-16 NOTE — H&P
H&P Exam - Carlotta Martinez 55 y o  female MRN: [id number]    Unit/Bed#: OR POOL Encounter: [id number]    Assessment:  Chronic pain    Plan:  -OR for generator placement    History of Present Illness   Patient with chronic pain  She has a spinal cord stimulator permanent trial with satisfactory relief  OR for placement of generator    Review of Systems   Constitutional: Negative  HENT: Negative  Cardiovascular:        HTN   Genitourinary: Negative  Neurological: Positive for weakness and numbness  Hematological: Negative  Historical Information   Past Medical History:   Diagnosis Date    Abdominal pain     Anxiety     Arthritis     Back pain     Breathing difficulty     Depression     Diarrhea     Dizziness     Edentulous     Fibromyalgia     Fibromyalgia     Full dentures     Upper and Lower  Can't wear right now due to recent surgery for sleep surgery    Heartburn     History of colon polyps     History of fall     History of HPV infection     Hypertension     IBS (irritable bowel syndrome)     Intermittent palpitations     Irregular heart beat     Leg pain, bilateral     Neuropathy     Bilateral hands, arms  Numbness tops of legs and buttocks and feet    Pneumonia     History of walking pneumonia multiple times    Restless leg syndrome     Seasonal allergies     Shortness of breath     Sleep apnea     History of- recent surgery to correct    Spinal stenosis     Urinary incontinence     Use of cane as ambulatory aid     Wears glasses      Past Surgical History:   Procedure Laterality Date    BLADDER SURGERY  02/10/2016    botox injections    BLOOD PATCH      post previous stim attempt   [address] [facility]  2011, 1997, 2004    x4 C3-4, C4-5, C5-6, C6-7    CHOLECYSTECTOMY  2000    lap ad    COLONOSCOPY      COLONOSCOPY N/A 5/6/2016    Procedure: COLONOSCOPY;  Surgeon: Yogesh Vásquez MD;  [facility];   Service:     GYNECOLOGIC CRYOSURGERY      SC ARTHRODESIS ANT INTERBODY MIN DISCECTOMY,LUMBAR N/A 9/13/2016    Procedure: FUSION LUMBAR INTERBODY ANTERIOR APPROACH  L4-5 L5-S1  WITH POSTERIOR INSTRUMENTATION ;  Surgeon: Keegan Robles MD;  Location: [facility];  Service: Orthopedics    MN EXCISION [facility] N/A 8/24/2016    Procedure: Endy Freshwater;  Surgeon: Dejon Elliott MD;  [facility];  Service: ENT    MN PALATOPHAYNGOPLASTY N/A 8/24/2016    Procedure: UVULOPALATOPHARYNGOPLASTY (UPPP);   Surgeon: Dejon Elliott MD;  [facility];  Service: ENT    MN PERCUT IMPLNT [address] N/A 4/9/2021    Procedure: INSERTION CERVICAL DORSAL COLUMN SPINAL CORD STIMULATOR PERCUTANEOUS PERMANENT TRIAL;  Surgeon: Ashly Anderson MD;  Location:  MAIN OR;  Service: Neurosurgery    SKIN BIOPSY  2015    R arm, scalp- all benign    SPINAL CORD STIMULATOR IMPLANT      lumbar  in place   [facility]      cervical    TONSILLECTOMY      TUBAL LIGATION       Social History   Social History     Substance and Sexual Activity   Alcohol Use No     Social History     Substance and Sexual Activity   Drug Use No    Frequency: 7 0 times per week    Comment: CBD oil, THC     Social History     Tobacco Use   Smoking Status Current Every Day Smoker    Packs/day: 1 00    Years: 38 00    Pack years: 38 00   Smokeless Tobacco Never Used   Tobacco Comment    encouraged smoking cessation     E-Cigarette Use: Never User     E-Cigarette/Vaping Substances    Nicotine No     THC No     CBD No     Flavoring No     Other No     Unknown No        Family History: non-contributory    Meds/Allergies   all medications and allergies reviewed  Allergies   Allergen Reactions    Strawberry Extract - Food Allergy Anaphylaxis    Contrast [Iodinated Diagnostic Agents] Hives     CT scan dye       Objective   First Vitals:   Blood Pressure: 129/64 (04/16/21 1255)  Pulse: 82 (04/16/21 1255)  Temperature: 98 1 °F (36 7 °C) (04/16/21 1255)  Temp Source: Oral (04/16/21 1255)  Respirations: 18 (04/16/21 1255)  SpO2: 97 % (04/16/21 1255)    Current Vitals:   Blood Pressure: 129/64 (04/16/21 1255)  Pulse: 82 (04/16/21 1255)  Temperature: 98 1 °F (36 7 °C) (04/16/21 1255)  Temp Source: Oral (04/16/21 1255)  Respirations: 18 (04/16/21 1255)  SpO2: 97 % (04/16/21 1255)    No intake or output data in the 24 hours ending 04/16/21 1317    Invasive Devices     Peripheral Intravenous Line            Peripheral IV 04/16/21 Left Antecubital less than 1 day                Physical Exam  Constitutional:       Appearance: Normal appearance  She is normal weight  HENT:      Head: Normocephalic and atraumatic  Eyes:      Extraocular Movements: Extraocular movements intact  Conjunctiva/sclera: Conjunctivae normal       Pupils: Pupils are equal, round, and reactive to light  Cardiovascular:      Rate and Rhythm: Normal rate and regular rhythm  Pulmonary:      Effort: Pulmonary effort is normal    Skin:     General: Skin is warm  Neurological:      Mental Status: She is alert and oriented to person, place, and time  Mental status is at baseline     Psychiatric:         Mood and Affect: Mood normal

## 2021-04-16 NOTE — ANESTHESIA POSTPROCEDURE EVALUATION
Post-Op Assessment Note    CV Status:  Stable  Pain Score: 0    Pain management: adequate     Mental Status:  Sleepy   Hydration Status:  Euvolemic   PONV Controlled:  Controlled   Airway Patency:  Patent  Airway: intubated      Post Op Vitals Reviewed: Yes      Staff: CRNA         No complications documented      BP      Temp      Pulse     Resp      SpO2

## 2021-04-16 NOTE — DISCHARGE INSTRUCTIONS
Discharge Instructions  Spinal Cord Stimulator (SCS)  Activity:  1  Do not lift more than 10 pounds for 6 weeks  2  Avoid bending, lifting and twisting for 6 weeks  3  No strenuous activities  No driving for 2 weeks  4  When able to shower, continue to use clean towel and washcloth for 2 weeks post-op  5  Continue to change bed linens and pajamas more frequently  Wear clean clothes daily  Surgical incision care: For Permanent SCS placement:   1  Keep incisions dry for 3 days  2  May shower with mild antimicrobial soap after 3 days  3  After 3 days, incisions may be left open to air, but must remain clean  For Trial SCS placement:   1  Wires and stimulator will be secured with a dressing  Do NOT remove  Keep dressing DRY  2  No showering with Trial in place  Sponge bath only  Do not immerse the incisions in water for 6 weeks  Do not apply any creams or ointments to the incision for 6 weeks, unless otherwise instructed by [facility]  Contact office if increasing redness, drainage, pain or swelling around the incisions  Postoperative medication:  Complete course of antibiotic as directed  1  For permanent spinal cord stimulators: 3 days   2  For spinal cord stimulator trials: Continue for duration of trial and 3 days after leads are pulled unless directed otherwise  [address] will provide pain medication as coordinated with your pain specialist  All prescriptions must come from a single provider  1  Take all medications as prescribed  Call office with any questions/concerns  Please contact office for questions regarding dosage and modifications  No antiplatelet, anticoagulation or Nonsteroidal anti-inflammatory (NSAIDs) medication until cleared by [address], unless otherwise instructed  Do not operate heavy machinery or vehicles while taking sedating medications     Use a bowel regimen while on opioids as they induce constipation  Ie  Senokot-S, Miralax, Colace, etc  Increase fiber and water intake  ***Note that it may take some time for the stimulator to improve chronic pain symptoms  Adjustment of the stimulator program can begin 2 weeks after placement  For TRIALS, there will be ongoing communication with the rep and adjustments as indicated  Please contact the stimulator representative if you have any questions regarding programing  ***

## 2021-04-16 NOTE — ANESTHESIA PREPROCEDURE EVALUATION
Procedure:  REMOVAL OF SPINAL CORD STIMULATOR, OR REMOVAL OF EXTENSIONS AND CONNECTION TO GENERATOR (N/A Buttocks)    Relevant Problems   ANESTHESIA (within normal limits)      CARDIO   (+) HTN (hypertension)      ENDO (within normal limits)      GI/HEPATIC (within normal limits)      /RENAL (within normal limits)      GYN (within normal limits)   (-) Currently pregnant      HEMATOLOGY (within normal limits)      MUSCULOSKELETAL   (+) Back pain   (+) Fibromyalgia      NEURO/PSYCH   (+) Anxiety   (+) Chronic pain syndrome   (+) Depression   (+) Fibromyalgia   (+) History of arthritis   (+) History of gastrointestinal disease   (+) History of hypertension      PULMONARY   (+) Obstructive sleep apnea syndrome   (+) Smoking      Other   (+) Lumbar spinal stenosis   (+) Obesity (BMI 30-39  9)        Physical Exam    Airway    Mallampati score: II  TM Distance: >3 FB  Neck ROM: limited     Dental   Comment: Edentulous, lower dentures and upper dentures,     Cardiovascular  Rhythm: regular, Rate: normal, Cardiovascular exam normal    Pulmonary  Breath sounds clear to auscultation, Decreased breath sounds,     Other Findings        Anesthesia Plan  ASA Score- 3     Anesthesia Type- general with ASA Monitors  Additional Monitors:   Airway Plan: ETT  Plan Factors-Exercise tolerance (METS): >4 METS  Chart reviewed  EKG reviewed  Existing labs reviewed  Patient summary reviewed  Patient is a current smoker  Patient instructed to abstain from smoking on day of procedure  Patient smoked on day of surgery  Obstructive sleep apnea risk education given perioperatively  Induction- intravenous  Postoperative Plan- Plan for postoperative opioid use  Informed Consent- Anesthetic plan and risks discussed with patient and spouse  I personally reviewed this patient with the CRNA  Discussed and agreed on the Anesthesia Plan with the CRNA  Ana Velasquez

## 2021-04-16 NOTE — OP NOTE
OPERATIVE REPORT  PATIENT NAME: Niki Garcia    :  1965  MRN: [id number]  Pt Location: [facility] OR ROOM 03    SURGERY DATE: 2021    Surgeon(s) and Role:     * Ashly Anderson MD - Primary    Preop Diagnosis:  Chronic pain syndrome [G89 4]  Cervical radiculopathy  Cervicalgia [M54 2]  Neck pain [M54 2]    Post-Op Diagnosis Codes:     * Chronic pain syndrome [G89 4]  Cervical radiculopathy     * Cervicalgia [M54 2]     * Neck pain [M54 2]    Procedure(s) (LRB):  REMOVAL OF SPINAL CORD STIMULATOR, OR REMOVAL OF EXTENSIONS AND CONNECTION TO GENERATOR (N/A)    Specimen(s):  * No specimens in log *    Estimated Blood Loss:   Minimal    Drains:  * No LDAs found *    Anesthesia Type:   General    Operative Indications:  Chronic pain syndrome [G89 4]  Cervical radiculopathy  Cervicalgia [M54 2]  Neck pain [M54 2]      Operative Findings:  See dictated note  [employer]  MRI montage  [id number]    Complications:   None    Procedure and Technique:  The patient was taken to the operative theater and successfully induced under general anesthesia  The patient was positioned prone on gel rolls  The patients prior incision was marked on the midline thoracic and another right flank incision was planned for the generator  Then the patient was prepped and draped in sterile fashion, a timeout was performed       We began by making an incision along the old scar midline  We were able to free up the wires and I disconnected the extensions and the caps were cut  The remaining extension wires were removed from inside out  I then cleaned the electrode wires and irrigated the wound copiously  I then tunneled the wires to a new subcutaneous pocket created on the right flank  Then the generator was brought into the field  The generator was connected with the screwdriver and then the impedances were tested and they were found to be within normal limits  Then we placed the generator into the pocket and irrigated the wound   We then proceeded to close in layers with 2-0 Vicryl for the deeper tissues and staples for the skin       The patient was then was allowed to awaken from general anesthesia repositioned supine and extubated and taken to the recovery area in stable condition  All needle and sponge counts were correct at the end of the procedure      I was present for the entire procedure, A qualified resident physician was not available and A physician assistant was required during the procedure for retraction tissue handling,dissection and suturing    Patient Disposition:  PACU     SIGNATURE: Huan Arcos MD  DATE: April 16, 2021  TIME: 2:39 PM

## 2021-04-19 ENCOUNTER — TELEMEDICINE (OUTPATIENT)
Dept: NEUROSURGERY | Facility: CLINIC | Age: 56
End: 2021-04-19

## 2021-04-19 DIAGNOSIS — Z98.890 POST-OPERATIVE STATE: Primary | ICD-10-CM

## 2021-04-19 PROCEDURE — 99024 POSTOP FOLLOW-UP VISIT: CPT

## 2021-04-19 NOTE — PROGRESS NOTES
Virtual Brief Visit    Assessment/Plan:    Problem List Items Addressed This Visit     None              Reason for visit is postop call  Chief Complaint   Patient presents with    Post-op    Virtual Brief Visit        Encounter provider Neurosurgery Nurse Minh    Provider located at [address] [place]  [address] [facility] [id number] [phone]    Recent Visits  Date Type Provider Dept   04/15/21 Telephone [facility]   Showing recent visits within past 7 days and meeting all other requirements     Today's Visits  Date Type Provider Dept   04/19/21 Telemedicine NEUROSURGERY NURSE [address] today's visits and meeting all other requirements     Future Appointments  No visits were found meeting these conditions  Showing future appointments within next 150 days and meeting all other requirements        After connecting through telephone, the patient was identified by name and date of birth  Petra Grajeda was informed that this is a telemedicine visit and that the visit is being conducted through telephone  My office door was closed  No one else was in the room  She acknowledged consent and understanding of privacy and security of the platform  The patient has agreed to participate and understands she can discontinue the visit at any time  Subjective    Petra Grajeda is a 55 y o  female REMOVAL OF SPINAL CORD STIMULATOR, OR REMOVAL OF EXTENSIONS AND CONNECTION TO GENERATOR (N/A Buttocks)  Past Medical History:   Diagnosis Date    Abdominal pain     Anxiety     Arthritis     Back pain     Breathing difficulty     Depression     Diarrhea     Dizziness     Edentulous     Fibromyalgia     Fibromyalgia     Full dentures     Upper and Lower   Can't wear right now due to recent surgery for sleep surgery    Heartburn     History of colon polyps     History of fall     History of HPV infection     Hypertension     IBS (irritable bowel syndrome)     Intermittent palpitations     Irregular heart beat     Leg pain, bilateral     Neuropathy     Bilateral hands, arms  Numbness tops of legs and buttocks and feet    Pneumonia     History of walking pneumonia multiple times    Restless leg syndrome     Seasonal allergies     Shortness of breath     Sleep apnea     History of- recent surgery to correct    Spinal stenosis     Urinary incontinence     Use of cane as ambulatory aid     Wears glasses        Past Surgical History:   Procedure Laterality Date    BLADDER SURGERY  02/10/2016    botox injections    BLOOD PATCH      post previous stim attempt   [address] [facility]  2011, 1997, 2004    x4 C3-4, C4-5, C5-6, C6-7    CHOLECYSTECTOMY  2000    lap ad    COLONOSCOPY      COLONOSCOPY N/A 5/6/2016    Procedure: COLONOSCOPY;  Surgeon: Jan Fleming MD;  [facility]; Service:     GYNECOLOGIC CRYOSURGERY      KS ARTHRODESIS ANT INTERBODY MIN DISCECTOMY,LUMBAR N/A 9/13/2016    Procedure: FUSION LUMBAR INTERBODY ANTERIOR APPROACH  L4-5 L5-S1  WITH POSTERIOR INSTRUMENTATION ;  Surgeon: Karena Bacon MD;  [facility];  Service: Orthopedics    KS EXCISION [facility] [facility] N/A 8/24/2016    Procedure: Janeane Mass;  Surgeon: Marilia Salmreon MD;  [facility];  Service: ENT    KS PALATOPHAYNGOPLASTY N/A 8/24/2016    Procedure: UVULOPALATOPHARYNGOPLASTY (UPPP);   Surgeon: Marilia Salmeron MD;  [facility];  Service: ENT    KS PERCUT IMPLNT [address] N/A 4/9/2021    Procedure: INSERTION CERVICAL DORSAL COLUMN SPINAL CORD STIMULATOR PERCUTANEOUS PERMANENT TRIAL;  Surgeon: Ki Day MD;  [facility];  Service: Neurosurgery    SKIN BIOPSY  2015    R arm, scalp- all benign    SPINAL CORD STIMULATOR IMPLANT      lumbar  in place    SPINAL CORD STIMULATOR REMOVAL      cervical    TONSILLECTOMY      TUBAL LIGATION         Current Outpatient Medications   Medication Sig Dispense Refill    baclofen 10 mg tablet Take 10 mg by mouth 3 (three) times a day      Camphor-Menthol-Methyl Sal (FLEXALL ULTRA PLUS EX) Salonpas      Citalopram Hydrobromide (CELEXA PO) Take 20 mg by mouth daily       DULoxetine (CYMBALTA) 60 mg delayed release capsule Take 60 mg by mouth 2 (two) times a day   gabapentin (NEURONTIN) 300 mg capsule Take 900 mg by mouth 3 (three) times a day 3 capsuless of 300mg TID      lisinopril (ZESTRIL) 20 mg tablet Take 20 mg by mouth daily      Menthol, Topical Analgesic, (BIOFREEZE EX) Apply topically      Multiple Vitamins-Minerals (MULTIVITAMIN ADULT PO) daily      omeprazole (PriLOSEC) 40 MG capsule Take 40 mg by mouth daily      oxyCODONE-acetaminophen (PERCOCET) 5-325 mg per tablet Si-2 tabs PO Q4H PRN    Ongoing therapy 40 tablet 0    propranolol (INDERAL) 80 mg tablet Take 80 mg by mouth daily at bedtime       rOPINIRole (REQUIP) 2 mg tablet Take 2 mg by mouth daily at bedtime   Vitamin D, Cholecalciferol, 50 MCG (2000 UT) CAPS Take 2,000 Units by mouth daily       No current facility-administered medications for this visit  Allergies   Allergen Reactions    Strawberry Extract - Food Allergy Anaphylaxis    Contrast [Iodinated Diagnostic Agents] Hives     CT scan dye     Called patient to see how she is doing after surgery  Patient reports she is doing well overall and denies any incisional issues or fevers  Patient is able to ambulate around the house and complete ADLs  Educated the patient about the importance of preventing blood clots and provided measures how to prevent them  Patient has moved her bowels since the surgery  Encouraged patient to take an over the counter stool softener, if she is taking narcotic pain medication  Encouraged fiber intake and fluids  Reviewed incision care with the patient   Patient states she has taken a shower and gently wash the surgical site with soap and water  Use clean wash cloth, towels, and clothing  Do not submerge in water until cleared by the surgeon  Do not apply any creams, ointments, or lotions to the site  Patient is aware to call the office if any redness, swelling, drainage, dehiscence of incision, or fever >100 F occurs  Patient is aware to call the office if any concerns or questions may arise  Reminded patient of her upcoming appointments with the date/time/location  Patient was appreciative for the call  VIRTUAL VISIT [address] acknowledges that she has consented to an online visit or consultation  She understands that the online visit is based solely on information provided by her, and that, in the absence of a face-to-face physical evaluation by the physician, the diagnosis she receives is both limited and provisional in terms of accuracy and completeness  This is not intended to replace a full medical face-to-face evaluation by the physician  Barbie Quinones understands and accepts these terms

## 2021-04-29 ENCOUNTER — OFFICE VISIT (OUTPATIENT)
Dept: NEUROSURGERY | Facility: CLINIC | Age: 56
End: 2021-04-29

## 2021-04-29 VITALS
RESPIRATION RATE: 16 BRPM | WEIGHT: 210 LBS | BODY MASS INDEX: 38.64 KG/M2 | HEIGHT: 62 IN | TEMPERATURE: 98.2 F | SYSTOLIC BLOOD PRESSURE: 126 MMHG | HEART RATE: 90 BPM | DIASTOLIC BLOOD PRESSURE: 76 MMHG

## 2021-04-29 DIAGNOSIS — G89.4 CHRONIC PAIN SYNDROME: ICD-10-CM

## 2021-04-29 DIAGNOSIS — M47.12 OSTEOARTHRITIS OF CERVICAL SPINE WITH MYELOPATHY: ICD-10-CM

## 2021-04-29 DIAGNOSIS — T81.49XA INFLAMMATION OF OPERATIVE INCISION: Primary | ICD-10-CM

## 2021-04-29 DIAGNOSIS — Z96.89 STATUS POST INSERTION OF SPINAL CORD STIMULATOR: ICD-10-CM

## 2021-04-29 PROCEDURE — 99024 POSTOP FOLLOW-UP VISIT: CPT | Performed by: NURSE PRACTITIONER

## 2021-04-29 RX ORDER — CEFUROXIME AXETIL 500 MG/1
500 TABLET ORAL EVERY 12 HOURS SCHEDULED
Qty: 14 TABLET | Refills: 0 | Status: SHIPPED | OUTPATIENT
Start: 2021-04-29 | End: 2021-05-06
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
## 2021-04-29 NOTE — PATIENT INSTRUCTIONS
Instructions for continued care at 2 weeks postop thru 6 weeks postop  · At 2 weeks postop can resume restricted medications such as ASA, products containing ASA, NSAID, fish oils, and OTC products or as previously directed  · Resume driving 2 week postoperatively  · Continue to observe incisions for redness, drainage, swelling dehiscence, increased pain, fever >/= 101, warmth to touch incision or skin surrounding, if occur call or report to office immediately for reassessment  · Staples removed from all incisions  · Continue showers using clean towel and wash cloth with OTC antibacterial body wash, pat dry after showering continue protocol for an additional 2 weeks  · Do not apply lotions, creams, powder, or ointments to surgical incisions  · Activity as tolerated, no bending, lifting  greater than 10 lbs, turning, stretching, no pulling, pushing  ambulation as tolerated  · Refrain from strenuous activity, bending, and  twisting back  · No Immersion in water such as swimming, hot tub, or tub bath  · If  there is any significant change condition  call and/or return to the office immediately for reassessment  · Explained in detail barrier protection when charging generator, never place charging device or santamaria directly on incision, provide barrier such as an item of thin clothing  Always provide clean storage for generator and santamaria  · Directed to discuss with Rep protocol for cleaning generator santamaria  · Met with product rep for device programing and teaching, refer to attached session report  · Explained chronic pain relief may not be immediate after reprogramming can take  Up to 72 hours to feel effect   · Contact Rep immediately if there is any change in efficacy or concern over SCS system  · Contact office if unable to reach Rep or if the reps   intervention does  not resolve issue  · Start antibiotic today continue for 7 days , redness of surgical incisions right flank     · Start probiotics take as per labeling instructions and yogurt 2-3 servings per day      · Sign up to use My chart , send photo after antibiotic completion

## 2021-04-30 PROBLEM — M47.12 OSTEOARTHRITIS OF CERVICAL SPINE WITH MYELOPATHY: Status: ACTIVE | Noted: 2021-04-30

## 2021-04-30 PROBLEM — Z96.89 STATUS POST INSERTION OF SPINAL CORD STIMULATOR: Status: ACTIVE | Noted: 2021-04-30

## 2021-04-30 NOTE — ASSESSMENT & PLAN NOTE
· As addressed in HPI        PLAN  · As addressed in status post insertio  Spinal cord stimulator Cervical

## 2021-04-30 NOTE — ASSESSMENT & PLAN NOTE
· As addressed in HPI  · Presents for after care satus post insertion cervical spinal cord stimulator, [employer]   MRI compliant [place]  · 3 surgical sites cervicothoracic, right flank, and left lateral thoracic (lead exit site during trial)  · Skin closure with staples--staples removed form all areas without incident , she tolerated procedure well  · Cervicothoracic and  right flank with surround soft tissue erythremia, no fluctuance  · All surgical sitesd are other-wise clean , dry , intact , with well approximated skin edges  · SCS remained off after surgery  · Approved  Activation of [employer]SC[employer] spinal cord stimulator , barrier protection of generator site reviewed in detail  · Arrived without cervical collar , reports wore cervical collar for only 2 days after permanent implant /end of trial  Remarked my neck does not move since tall the surgeries I had    Rep met with patient activated and programmed SCS /Session Report   Program 1--0N  Amplitude 0 9 mA  Pulse Width 210 ?s  Rate 40 Hz  Cycle Continuous  Min mA 0 0 mA  Max mA 25 5 mA  Ramp Up 3 0 sec    Program 2--on   Amplitude 0 9 mA  Pulse Width 210 ?s  Rate 60 Hz  Cycle Continuous  Min mA 0 0 mA  Max mA 25 5 mA  Ramp Up 3 0 sec    Program 3 --on   Amplitude 0 6 mA  Pulse Width 210 ?s  Rate 40 Hz  Cycle Continuous  Min mA 0 0 mA  Max mA 25 5 mA    Program 4  Area Status Stimulation: OFF  Amplitude 3 0 mA  Pulse Width 260 ?s  Rate 60 Hz  Cycle Continuous  Min mA 0 0 mA  Max mA 5 0 mA  Ramp Up 3 0 sec    Program 5  Area Status Stimulation:  ---off   Amplitude 2 1 mA  Pulse Width 240 ?s  Rate 60 Hz  Cycle Continuous  Min mA 0 0 mA  Max mA 3 9 mA  Ramp Up 3 0 sec  Ramp Up 3 0 sec      Plan   · As detailed in AVS, reviewed in detail , copy provided   · Start antibiotic today continue for 7 days , redness of surgical incisions right flank   · Start probiotics take as per labeling instructions and yogurt 2-3 servings per day      · Sign up to use My chart , send photo after antibiotic completion, call immediately if deterioration in incisions or condition as detailed in AVS

## 2021-05-27 ENCOUNTER — OFFICE VISIT (OUTPATIENT)
Dept: NEUROSURGERY | Facility: CLINIC | Age: 56
End: 2021-05-27

## 2021-05-27 VITALS
HEART RATE: 83 BPM | BODY MASS INDEX: 38.64 KG/M2 | TEMPERATURE: 96.9 F | WEIGHT: 210 LBS | DIASTOLIC BLOOD PRESSURE: 84 MMHG | HEIGHT: 62 IN | SYSTOLIC BLOOD PRESSURE: 126 MMHG | RESPIRATION RATE: 16 BRPM

## 2021-05-27 DIAGNOSIS — G89.4 CHRONIC PAIN SYNDROME: Primary | ICD-10-CM

## 2021-05-27 PROCEDURE — 99024 POSTOP FOLLOW-UP VISIT: CPT | Performed by: NEUROLOGICAL SURGERY

## 2021-05-27 NOTE — PROGRESS NOTES
Assessment/Plan:    No problem-specific Assessment & Plan notes found for this encounter  6 weeks postop scs  Doing well improvement in pain  No physical restrictions  Incision needs some more observation  F/u in one month for wound check     Diagnoses and all orders for this visit:    Chronic pain syndrome          Subjective:      Patient ID: Ivelisse Dhillon is a 55 y o  female  HPI  6 weeks postop from scs cervical     The following portions of the patient's history were reviewed and updated as appropriate: allergies, current medications, past family history, past medical history, past social history, past surgical history and problem list     Review of Systems   Constitutional: Positive for fatigue (chronic)  HENT: Positive for trouble swallowing  Eyes: Negative  Respiratory: Negative  Negative for shortness of breath  Cardiovascular: Negative  Gastrointestinal: Positive for constipation  Negative for abdominal pain  Endocrine:        Cold sensation Right foot  Feels hot all the time   Genitourinary: Positive for frequency and urgency  Incontinence and leakage, especially when coughing/sneezing  Wetting bed at night   Musculoskeletal: Positive for arthralgias (Pain into the hips, buttocks and legs), back pain (constant across low back pain) and neck pain  Under breast down to feet, extreme pain/tightness   Skin: Negative for wound  Allergic/Immunologic: Positive for food allergies (strawberry/strawberry extract)  Neurological: Positive for weakness (All extremities ), numbness (All extremities ) and headaches  Negative for dizziness, tremors, seizures and syncope  Hematological: Negative  Psychiatric/Behavioral: Positive for dysphoric mood and sleep disturbance (due to pain)         I have personally reviewed all aspects of the review of systems as documented    Objective:      /84 (BP Location: Right arm)   Pulse 83   Temp (!) 96 9 °F (36 1 °C) (Tympanic) Resp 16   Ht 5' 2" (1 575 m)   Wt 95 3 kg (210 lb)   BMI 38 41 kg/m²          Physical Exam  Constitutional:       Appearance: She is normal weight  Cardiovascular:      Rate and Rhythm: Normal rate  Neurological:      Mental Status: She is alert  Mental status is at baseline         scabbing of incision but no drainage or erythema

## 2021-05-27 NOTE — PROGRESS NOTES
Incisions assessed as requested by Dr Freda Hernández   Cervical thing fragile healed scar   right flank incision looks suspicious need monitoring   Patient with history of prior SCS infection requiring explantation   Keep incisions dry, admits she sweats a lot   Do not rub incicion , keep pressure off incisions, bra strap   Patient reports she sweats a lot    RTO in 3 weeks for incision recheck as per dr German Aranda office if incisions  open, drain (blood , clear fluid , pus), edema, redness, fever 101 or >, chills      Refer to photo attachments

## 2021-06-16 ENCOUNTER — TELEPHONE (OUTPATIENT)
Dept: NEUROSURGERY | Facility: CLINIC | Age: 56
End: 2021-06-16

## 2021-06-16 NOTE — TELEPHONE ENCOUNTER
Patient called and voiced concerns about a burning pain she is having at the nape of her neck to the right side  Patient states it feels like it is on fire  States she had this sensation prior to her surgery however she was hopeful the surgery would help this pain  Denies any incisional issues  No redness, swelling, drainage, fevers  No new numbness, tingling or weakness  Patient states nothing is helping her pain  States she has tried heat and ice as well as biofreeze  Advised patient to call her PM and address this with them to see if they can maybe adjust her medications or if they have any other recommendations  Patient confirmed she will be at her appt tomorrow 6/17 at 1330  Will route to ANDREW KO as an FYI

## 2021-06-17 ENCOUNTER — OFFICE VISIT (OUTPATIENT)
Dept: NEUROSURGERY | Facility: CLINIC | Age: 56
End: 2021-06-17

## 2021-06-17 VITALS
TEMPERATURE: 96.7 F | SYSTOLIC BLOOD PRESSURE: 90 MMHG | RESPIRATION RATE: 16 BRPM | DIASTOLIC BLOOD PRESSURE: 60 MMHG | WEIGHT: 203 LBS | HEART RATE: 86 BPM | BODY MASS INDEX: 37.36 KG/M2 | HEIGHT: 62 IN

## 2021-06-17 DIAGNOSIS — Z96.89 STATUS POST INSERTION OF SPINAL CORD STIMULATOR: ICD-10-CM

## 2021-06-17 DIAGNOSIS — G89.4 CHRONIC PAIN SYNDROME: ICD-10-CM

## 2021-06-17 DIAGNOSIS — M62.838 CERVICAL PARASPINAL MUSCLE SPASM: Primary | ICD-10-CM

## 2021-06-17 PROCEDURE — 99024 POSTOP FOLLOW-UP VISIT: CPT | Performed by: NURSE PRACTITIONER

## 2021-06-17 RX ORDER — TIZANIDINE 4 MG/1
4 TABLET ORAL EVERY 8 HOURS PRN
Qty: 90 TABLET | Refills: 0 | Status: SHIPPED | OUTPATIENT
Start: 2021-06-17 | End: 2022-03-28 | Stop reason: ALTCHOICE

## 2021-06-17 RX ORDER — OXYCODONE AND ACETAMINOPHEN 10; 325 MG/1; MG/1
1 TABLET ORAL EVERY 6 HOURS PRN
COMMUNITY
Start: 2021-06-14 | End: 2021-07-14

## 2021-06-17 NOTE — PROGRESS NOTES
Assessment/Plan:    Status post insertion of spinal cord stimulator  · As addressed in HPI  · Reassessment of upper thoracic and generator site incision revealed appropriately healed scar , intact skin  · Refer to attachment photos of both incisions  · SCS  is not relieving burning sensation in right sided cervical and scapular  area, right sided  cervical paraspinal area  Severity 7/10 on verbal numeric scale  · Spinal cord stimulator relieving pain  in bilateral hands and arms  · Current medication regimen not helping pain, cyclobenzaprine,  Gabapentin or percocet  not helping  Was prescribed methocarbamol but insurance would not cover  Cymbalta she reports is for anxiety and mood  · Is tearful , argumentative with spouse , remarked she is in so much discomfort this causes anxiety, asked for another antispasmodic order  ·      PLAN;  · Instructed to call office if incision dehiscence, drainage , redness, fever , chills   · Tizanidine prescribed 30 days supply  To assess effect , if help  Obtain future orders for pain management  · F/U with pain management , maintain relationship  Dr Milly Guerin   Chronic pain syndrome  · As addressed in HPI  · As addressed in status post insertion of SCS     PLAN;  As addressed in SCS insertion     There are no diagnoses linked to this encounter  Subjective: postop incision recheck 3 weeks after 6 week po visit     Patient ID: Morris Gregorio is a 55 y o  female     HPI   Has a longstanding history of chronic pain managed with a spinal cord stimulator for many years   Cervicalgia and bilateral cervical radiculopathy, myelopathy, spasticity and neuropathic symptoms in bilateral upper extremities      Pain distribution for neck into bilateral upper extremities, L > R  Has associated symptoms of numbness in rbilateral arms, into fingers right hand 1-3 and subjective upper extremity weakness    Reports dropping things and has problems with buttons and using cutlery Had 2 cervical SCS inserted 2019  then removed for percutaneous lead migration  2 physicians have refused to attempt another cervical SCS insertion  She consulted with Dr Diamante Tian 3/11/2021 after completion of updated imagining, after review agreed to proceed with surgery  Underwent surgeries for trial then permanent implant   4/9/21 INSERTION CERVICAL DORSAL COLUMN SPINAL CORD STIMULATOR PERCUTANEOUS PERMANENT TRIAL (N/A Spine Thoracic)  4/16/2021  REMOVAL OF SPINAL CORD STIMULATOR, OR REMOVAL OF EXTENSIONS AND CONNECTION TO GENERATOR (N/A Buttocks)  At 6 week postoperative visit Dr Diamante Tian was concerned about appearance of upper thoracic incision , recommended return for reassessment  REVIEW OF SYSTEMS  Review of Systems   Constitutional: Positive for fatigue (chronic)  HENT: Positive for trouble swallowing  Eyes: Positive for visual disturbance  Respiratory: Positive for shortness of breath (2 episodes last Friday night)  Cardiovascular: Negative  Gastrointestinal: Positive for constipation  Negative for abdominal pain  Endocrine: Positive for heat intolerance  Cold sensation Right toes  Feels hot all the time   Genitourinary: Positive for difficulty urinating, frequency and urgency  Incontinence and leakage, especially when coughing/sneezing  Wetting bed at night   Musculoskeletal: Positive for arthralgias (Pain into the hips, buttocks and legs ), back pain (constant across low back pain ) and neck pain ("burning" in neck)  Under breast down to feet, extreme pain/tightness   Skin: Positive for wound (here for incision check)  Allergic/Immunologic: Positive for food allergies (strawberry/strawberry extract)  Neurological: Positive for weakness (All extremities), numbness (All extremities) and headaches (off and on)  Negative for dizziness, tremors, seizures and syncope  Paresthesias of hands   Hematological: Negative      Psychiatric/Behavioral: Positive for dysphoric mood and sleep disturbance (due to pain  )  The patient is nervous/anxious  Meds/Allergies     Current Outpatient Medications   Medication Sig Dispense Refill    Camphor-Menthol-Methyl Sal (FLEXALL ULTRA PLUS EX) Salonpas      Citalopram Hydrobromide (CELEXA PO) Take 20 mg by mouth daily       DULoxetine (CYMBALTA) 60 mg delayed release capsule Take 60 mg by mouth 2 (two) times a day   gabapentin (NEURONTIN) 300 mg capsule Take 900 mg by mouth 3 (three) times a day 3 capsuless of 300mg TID      lisinopril (ZESTRIL) 20 mg tablet Take 20 mg by mouth daily      Menthol, Topical Analgesic, (BIOFREEZE EX) Apply topically      Multiple Vitamins-Minerals (MULTIVITAMIN ADULT PO) daily      omeprazole (PriLOSEC) 40 MG capsule Take 40 mg by mouth daily      oxyCODONE-acetaminophen (PERCOCET)  mg per tablet Take 1 tablet by mouth every 6 (six) hours as needed      propranolol (INDERAL) 80 mg tablet Take 80 mg by mouth daily at bedtime       rOPINIRole (REQUIP) 2 mg tablet Take 2 mg by mouth daily at bedtime   Vitamin D, Cholecalciferol, 50 MCG (2000 UT) CAPS Take 2,000 Units by mouth daily      tiZANidine (ZANAFLEX) 4 mg tablet Take 1 tablet (4 mg total) by mouth every 8 (eight) hours as needed for muscle spasms 90 tablet 0     No current facility-administered medications for this visit  Allergies   Allergen Reactions    Strawberry Extract - Food Allergy Anaphylaxis    Contrast [Iodinated Diagnostic Agents] Hives     CT scan dye       PAST HISTORY    Past Medical History:   Diagnosis Date    Abdominal pain     Anxiety     Arthritis     Back pain     Breathing difficulty     Depression     Diarrhea     Dizziness     Edentulous     Fibromyalgia     Fibromyalgia     Full dentures     Upper and Lower   Can't wear right now due to recent surgery for sleep surgery    Heartburn     History of colon polyps     History of fall     History of HPV infection     Hypertension     IBS (irritable bowel syndrome)     Intermittent palpitations     Irregular heart beat     Leg pain, bilateral     Neuropathy     Bilateral hands, arms  Numbness tops of legs and buttocks and feet    Pneumonia     History of walking pneumonia multiple times    Restless leg syndrome     Seasonal allergies     Shortness of breath     Sleep apnea     History of- recent surgery to correct    Spinal stenosis     Urinary incontinence     Use of cane as ambulatory aid     Wears glasses        Past Surgical History:   Procedure Laterality Date    BLADDER SURGERY  02/10/2016    botox injections    BLOOD PATCH      post previous stim attempt   [address]  2011, 1997, 2004    x4 C3-4, C4-5, C5-6, C6-7    CHOLECYSTECTOMY  2000    lap ad    COLONOSCOPY      COLONOSCOPY N/A 5/6/2016    Procedure: COLONOSCOPY;  Surgeon: Josette Kaur MD;  [facility]; Service:     GYNECOLOGIC CRYOSURGERY      DC ARTHRODESIS ANT INTERBODY MIN DISCECTOMY,LUMBAR N/A 9/13/2016    Procedure: FUSION LUMBAR INTERBODY ANTERIOR APPROACH  L4-5 L5-S1  WITH POSTERIOR INSTRUMENTATION ;  Surgeon: Hubert Allred MD;  Location: [facility];  Service: Orthopedics    DC EXCISION [address] [facility] N/A 8/24/2016    Procedure: Milo Fluke;  Surgeon: Marlene Carreon MD;  [facility];  Service: ENT    DC PALATOPHAYNGOPLASTY N/A 8/24/2016    Procedure: UVULOPALATOPHARYNGOPLASTY (UPPP);   Surgeon: Marlene Carreon MD;  [facility];  Service: ENT    DC PERCUT IMPLNT [address] N/A 4/9/2021    Procedure: INSERTION CERVICAL DORSAL COLUMN SPINAL CORD STIMULATOR PERCUTANEOUS PERMANENT TRIAL;  Surgeon: Brooke Garcia MD;  Location: [facility];  Service: Neurosurgery    SKIN BIOPSY  2015    R arm, scalp- all benign    SPINAL CORD STIMULATOR IMPLANT      lumbar  in place    SPINAL CORD STIMULATOR REMOVAL      cervical    SPINAL CORD STIMULATOR REMOVAL N/A 4/16/2021    Procedure: REMOVAL OF SPINAL CORD STIMULATOR, OR REMOVAL OF EXTENSIONS AND CONNECTION TO GENERATOR;  Surgeon: Allison Kehr, MD;  Location:  MAIN OR;  Service: Neurosurgery    TONSILLECTOMY      TUBAL LIGATION         Social History     Tobacco Use    Smoking status: Current Every Day Smoker     Packs/day: 1 50     Years: 38 00     Pack years: 57 00    Smokeless tobacco: Never Used    Tobacco comment: encouraged smoking cessation   Vaping Use    Vaping Use: Never used   Substance Use Topics    Alcohol use: No    Drug use: Yes     Frequency: 7 0 times per week     Types: Marijuana     Comment: CBD oil, THC       Family History   Problem Relation Age of Onset    Thyroid cancer Mother     Thyroid cancer Father     Alcohol abuse Sister     No Known Problems Brother     Cancer Family        The following portions of the patient's history were reviewed and updated as appropriate: allergies, current medications, past family history, past medical history, past social history, past surgical history and problem list       EXAM    Vitals:Blood pressure 90/60, pulse 86, temperature (!) 96 7 °F (35 9 °C), temperature source Tympanic, resp  rate 16, height 5' 2" (1 575 m), weight 92 1 kg (203 lb), not currently breastfeeding  ,Body mass index is 37 13 kg/m²  Physical Exam  Neck:      Comments: Limitation in cervical ROM  Musculoskeletal:      Right lower leg: Edema (trace ankled feet ) present  Left lower leg: Edema (trace ankled and feet) present  Skin:     General: Skin is warm and dry  Neurological:      Mental Status: She is alert and oriented to person, place, and time  Mental status is at baseline  Gait: Gait is intact  Psychiatric:         Mood and Affect: Mood normal          Behavior: Behavior normal          Neurologic Exam     Mental Status   Oriented to person, place, and time     Level of consciousness: alert    Motor Exam     Strength   Right quadriceps: 5/5  Left quadriceps: 5/5  Right hamstrin/5  Left hamstrin/5  Right anterior tibial: 5/5  Left anterior tibial: 5/5  Right gastroc: 5/5  Left gastroc: 5/5    Gait, Coordination, and Reflexes     Gait  Gait: normal      Imaging Studies  No results found

## 2021-06-18 NOTE — ASSESSMENT & PLAN NOTE
· As addressed in HPI  · As addressed in status post insertion of SCS     PLAN;  As addressed in SCS insertion

## 2022-01-28 ENCOUNTER — TELEPHONE (OUTPATIENT)
Dept: NEUROSURGERY | Facility: CLINIC | Age: 57
End: 2022-01-28

## 2022-01-28 NOTE — TELEPHONE ENCOUNTER
Received a call from Chiquita Cardenas reporting worsening myelopathy of her left hand over the last 4 weeks  She denies exacerbating incidents such as falls or trauma  She first contacted the [facility] rep regarding programming who directed her to us for assessment  She would like to return to the office for additional assessment  She requested appt with MAIKEL MORALES, assisted to schedule this visit for monday 1/31/2022  History of COVID, however states she outside the quarantine timeframe

## 2022-01-31 ENCOUNTER — OFFICE VISIT (OUTPATIENT)
Dept: NEUROSURGERY | Facility: CLINIC | Age: 57
End: 2022-01-31
Payer: MEDICARE

## 2022-01-31 VITALS
BODY MASS INDEX: 34.23 KG/M2 | DIASTOLIC BLOOD PRESSURE: 98 MMHG | HEIGHT: 62 IN | TEMPERATURE: 98.8 F | WEIGHT: 186 LBS | HEART RATE: 92 BPM | RESPIRATION RATE: 16 BRPM | SYSTOLIC BLOOD PRESSURE: 146 MMHG

## 2022-01-31 DIAGNOSIS — Z96.89 STATUS POST INSERTION OF SPINAL CORD STIMULATOR: ICD-10-CM

## 2022-01-31 DIAGNOSIS — R20.8 DECREASED SENSATION OF HAND AND UPPER EXTREMITY: ICD-10-CM

## 2022-01-31 DIAGNOSIS — M79.601 PARESTHESIA AND PAIN OF BOTH UPPER EXTREMITIES: ICD-10-CM

## 2022-01-31 DIAGNOSIS — M79.602 PARESTHESIA AND PAIN OF BOTH UPPER EXTREMITIES: ICD-10-CM

## 2022-01-31 DIAGNOSIS — M62.81 MUSCLE WEAKNESS OF LEFT UPPER EXTREMITY: Primary | ICD-10-CM

## 2022-01-31 DIAGNOSIS — R20.2 PARESTHESIA AND PAIN OF BOTH UPPER EXTREMITIES: ICD-10-CM

## 2022-01-31 PROCEDURE — 99214 OFFICE O/P EST MOD 30 MIN: CPT | Performed by: NURSE PRACTITIONER

## 2022-01-31 RX ORDER — ALBUTEROL SULFATE 90 UG/1
2 AEROSOL, METERED RESPIRATORY (INHALATION) EVERY 6 HOURS PRN
COMMUNITY
Start: 2021-07-27 | End: 2022-02-21

## 2022-01-31 RX ORDER — ESCITALOPRAM OXALATE 10 MG/1
10 TABLET ORAL DAILY
COMMUNITY
End: 2022-05-04

## 2022-01-31 RX ORDER — OXYCODONE AND ACETAMINOPHEN 10; 325 MG/1; MG/1
1 TABLET ORAL 4 TIMES DAILY
COMMUNITY
Start: 2022-01-04 | End: 2022-05-19

## 2022-01-31 NOTE — PATIENT INSTRUCTIONS
Paresthesia   WHAT YOU NEED TO KNOW:   What is paresthesia? Paresthesia is numbness, tingling, or burning  It can happen in any part of your body, but usually occurs in your legs, feet, arms, or hands  What causes paresthesia? A large number of conditions can cause paresthesia  Nerves that provide sensation are affected  Paresthesia happens because of changes in these nerves, or in nerve pathways  The changes can be temporary, such as if you take certain medicines or you are not getting enough vitamin B  Nerve damage can lead to permanent paresthesia  Conditions that may cause nerve damage include diabetes, carpel tunnel syndrome, stroke, and multiple sclerosis  The exact cause of your paresthesia may not be known  What should I tell my healthcare provider about what I feel? You can help your healthcare provider by describing anything you feel, such as the following:  · No feeling in the affected area    · A feeling of pins and needles    · An electric shock feeling    · Heaviness    · Trouble moving the affected area    · A feeling that something is crawling under your skin    · A feeling of burning or of cold in the affected area    How is paresthesia diagnosed? Your healthcare provider will examine you and ask about your symptoms  Tell your provider when the symptoms began  Include anything that makes your symptoms worse or better  Your provider will also need to know if you have a disease or condition that could be causing your symptoms  Tell him or her about the medicines you take  Include the amounts you take and when you take each medicine  You may also need any of the following:  · Blood tests  may show low levels of vitamin B or a high blood sugar level  · X-ray, MRI, or CT scan  pictures may show damage to the area where you have paresthesia  You may be given contrast liquid to help the area show up better in the pictures   Tell the healthcare provider if you have ever had an allergic reaction to contrast liquid  Do not enter the MRI room with anything metal  Metal can cause severe injury  Tell the healthcare provider if you have any metal in or on your body  · Nerve conduction studies  may be done to test your nerve function  How is paresthesia treated? Treatment will depend on what is causing your paresthesia  You may need to increase the amount of vitamin B in your blood  Your healthcare provider may change or stop a medicine you are taking that is causing your symptoms  Permanent paresthesia may be helped with nerve medicine  If you have diabetes, your healthcare provider or diabetes specialist can help you control your blood sugar levels  Your provider may recommend a splint or surgery if you have paresthesia caused by carpal tunnel syndrome  What can I do to manage paresthesia? · Protect the area from injury  You may injure or burn yourself if you lose feeling in the area  Be careful when you touch anything that could be hot  Wear sturdy shoes to protect your feet  Ask about other ways to protect yourself  · Go to physical or occupational therapy if directed  Your provider may recommend therapy if you have a condition such as carpal tunnel syndrome  A physical therapist can teach you exercises to help strengthen the area or increase your ability to move it  An occupational therapist can help you find new ways to do your daily activities  · Manage health conditions that can cause paresthesia  Work with your diabetes specialist if you have uncontrolled diabetes  A dietitian or your healthcare provider can help you create a meal plan if you have low vitamin B levels  Your provider can help you manage your health if you have multiple sclerosis or you had a stroke  It is important to manage health conditions to stop paresthesia or prevent it from getting worse  When should I seek immediate care? · You have severe pain along with numbness and tingling      · Your legs suddenly become cold  You have trouble moving your legs, and they ache  · You have increased weakness in a part of your body  · You have uncontrolled movements  When should I contact my healthcare provider? · Your symptoms do not improve  · You have symptoms in more than one part of your body  · You have questions or concerns about your condition or care  CARE AGREEMENT:   You have the right to help plan your care  Learn about your health condition and how it may be treated  Discuss treatment options with your healthcare providers to decide what care you want to receive  You always have the right to refuse treatment  The above information is an  only  It is not intended as medical advice for individual conditions or treatments  Talk to your doctor, nurse or pharmacist before following any medical regimen to see if it is safe and effective for you  © Copyright [employer] 2021 Information is for End User's use only and may not be sold, redistributed or otherwise used for commercial purposes   All illustrations and images included in CareNotes® are the copyrighted property of [employer]  or [address] [place] [address]
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
## 2022-02-01 NOTE — ASSESSMENT & PLAN NOTE
· As addressed in HPII  · Has muscle weakness in left upper extremity , finger to nose past pointing, diminished motor function in finger, decreased thumb opposition left hand  · clumsy weak left hand   complains of neck stiffness and clicking   · Pain in upper back , upper scapular area  · Weakness and hands and musculature in left hand  · Paresthesia sensation in arms and hands bilaterally, chronic   · Decreased sensation in left and right fingers small, middle , ring and index fingers  · mJOA 12  · Subjective gait instability need railings to walk up stairs  · Reports HO bilateral carpel tunnel disease , Tinel's and Phalen equivocal   · Hoffnam's left and righ equivocal --reports was positive in the past        Imagining MRI cervical spine 1/18/2021   · Patient is status post C3-T1 anterior fusion  Disc spacers are noted at C6-7 and C7-T1  Susceptibility artifact from the C7-T1 spacer projects into the spinal canal and there is potentially posterior slippage of the interdiscal component accounting for  ·  the susceptibility artifact  Plain film evaluation may be worthwhile to evaluate for posterior slippage  Several sites of myelomalacia in the cervical cord are again noted including slight increase at the T1 level when compared to the prior study  Remaining foci located at C4-5, C5, and C6-7 levels are unchanged   Bilateral sternoclavicular joint degenerative changes  PLAN  · Xray cervical spine 4 views, assess HW spacer concern for slippage on MRI imagining (Plain film evaluation may be worthwhile to evaluate for posterior slippage)   Assessnd bony anatomy  · MRI cervical spine reassess myelomalacia   · EMG bilateral upper extremities  · RT snpx after diagnostics complete  6-8 weeks snpx Dr Guzman Meth  · If symptoms worsen go to closest emergency room

## 2022-02-09 ENCOUNTER — TELEPHONE (OUTPATIENT)
Dept: NEUROLOGY | Facility: CLINIC | Age: 57
End: 2022-02-09

## 2022-02-09 NOTE — TELEPHONE ENCOUNTER
Called patient and I rescheduled her 03/23/22 EMG to 03/24/22 with Dr Fatou Cortez  Patient has 2 stimulators in

## 2022-02-21 ENCOUNTER — OFFICE VISIT (OUTPATIENT)
Dept: OBGYN CLINIC | Facility: CLINIC | Age: 57
End: 2022-02-21
Payer: MEDICARE

## 2022-02-21 ENCOUNTER — PREP FOR PROCEDURE (OUTPATIENT)
Dept: OBGYN CLINIC | Facility: CLINIC | Age: 57
End: 2022-02-21

## 2022-02-21 VITALS
DIASTOLIC BLOOD PRESSURE: 93 MMHG | WEIGHT: 186 LBS | HEART RATE: 93 BPM | HEIGHT: 62 IN | SYSTOLIC BLOOD PRESSURE: 148 MMHG | BODY MASS INDEX: 34.23 KG/M2

## 2022-02-21 DIAGNOSIS — M25.562 ACUTE PAIN OF LEFT KNEE: ICD-10-CM

## 2022-02-21 DIAGNOSIS — Z11.59 SPECIAL SCREENING EXAMINATION FOR VIRAL DISEASE: ICD-10-CM

## 2022-02-21 DIAGNOSIS — Z01.812 PRE-OPERATIVE LABORATORY EXAMINATION: ICD-10-CM

## 2022-02-21 DIAGNOSIS — M17.12 PRIMARY OSTEOARTHRITIS OF LEFT KNEE: Primary | ICD-10-CM

## 2022-02-21 PROCEDURE — 99203 OFFICE O/P NEW LOW 30 MIN: CPT | Performed by: ORTHOPAEDIC SURGERY

## 2022-02-21 RX ORDER — CHLORHEXIDINE GLUCONATE 4 G/100ML
SOLUTION TOPICAL DAILY PRN
Status: CANCELLED | OUTPATIENT
Start: 2022-02-21

## 2022-02-21 RX ORDER — ASCORBIC ACID 500 MG
500 TABLET ORAL 2 TIMES DAILY
Qty: 60 TABLET | Refills: 1 | Status: SHIPPED | OUTPATIENT
Start: 2022-02-21 | End: 2022-05-18

## 2022-02-21 RX ORDER — FERROUS SULFATE TAB EC 324 MG (65 MG FE EQUIVALENT) 324 (65 FE) MG
324 TABLET DELAYED RESPONSE ORAL
Qty: 60 TABLET | Refills: 1 | Status: SHIPPED | OUTPATIENT
Start: 2022-02-21 | End: 2022-05-04

## 2022-02-21 RX ORDER — MULTIVIT-MIN/IRON FUM/FOLIC AC 7.5 MG-4
1 TABLET ORAL DAILY
Qty: 30 TABLET | Refills: 1 | Status: SHIPPED | OUTPATIENT
Start: 2022-02-21 | End: 2022-05-04

## 2022-02-21 RX ORDER — FOLIC ACID 1 MG/1
1 TABLET ORAL DAILY
Qty: 30 TABLET | Refills: 1 | Status: SHIPPED | OUTPATIENT
Start: 2022-02-21 | End: 2022-05-04

## 2022-02-21 RX ORDER — CHLORHEXIDINE GLUCONATE 0.12 MG/ML
15 RINSE ORAL ONCE
Status: CANCELLED | OUTPATIENT
Start: 2022-02-21 | End: 2022-02-21

## 2022-02-21 RX ORDER — FLUTICASONE FUROATE AND VILANTEROL TRIFENATATE 100; 25 UG/1; UG/1
1 POWDER RESPIRATORY (INHALATION) AS NEEDED
COMMUNITY
Start: 2022-02-09

## 2022-02-21 RX ORDER — FLUTICASONE FUROATE AND VILANTEROL TRIFENATATE 100; 25 UG/1; UG/1
POWDER RESPIRATORY (INHALATION)
COMMUNITY
Start: 2022-02-09 | End: 2022-02-21

## 2022-02-21 RX ORDER — FAMOTIDINE 40 MG/1
40 TABLET, FILM COATED ORAL 2 TIMES DAILY
COMMUNITY
Start: 2022-02-08 | End: 2022-05-04
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
## 2022-02-23 ENCOUNTER — PREP FOR PROCEDURE (OUTPATIENT)
Dept: OBGYN CLINIC | Facility: CLINIC | Age: 57
End: 2022-02-23

## 2022-03-10 ENCOUNTER — TELEPHONE (OUTPATIENT)
Dept: NEUROLOGY | Facility: CLINIC | Age: 57
End: 2022-03-10

## 2022-03-22 ENCOUNTER — HOSPITAL ENCOUNTER (OUTPATIENT)
Dept: RADIOLOGY | Facility: HOSPITAL | Age: 57
Discharge: HOME | End: 2022-03-22

## 2022-03-22 ENCOUNTER — HOSPITAL ENCOUNTER (OUTPATIENT)
Dept: MRI IMAGING | Facility: HOSPITAL | Age: 57
Discharge: HOME | End: 2022-03-22
Payer: MEDICARE

## 2022-03-22 DIAGNOSIS — M79.601 PARESTHESIA AND PAIN OF BOTH UPPER EXTREMITIES: ICD-10-CM

## 2022-03-22 DIAGNOSIS — M79.602 PARESTHESIA AND PAIN OF BOTH UPPER EXTREMITIES: ICD-10-CM

## 2022-03-22 DIAGNOSIS — Z96.89 STATUS POST INSERTION OF SPINAL CORD STIMULATOR: ICD-10-CM

## 2022-03-22 DIAGNOSIS — M62.81 MUSCLE WEAKNESS OF LEFT UPPER EXTREMITY: ICD-10-CM

## 2022-03-22 DIAGNOSIS — R20.2 PARESTHESIA AND PAIN OF BOTH UPPER EXTREMITIES: ICD-10-CM

## 2022-03-22 DIAGNOSIS — R20.8 DECREASED SENSATION OF HAND AND UPPER EXTREMITY: ICD-10-CM

## 2022-03-22 PROCEDURE — 72141 MRI NECK SPINE W/O DYE: CPT

## 2022-03-24 ENCOUNTER — PROCEDURE VISIT (OUTPATIENT)
Dept: NEUROLOGY | Facility: CLINIC | Age: 57
End: 2022-03-24
Payer: MEDICARE

## 2022-03-24 DIAGNOSIS — M79.601 PARESTHESIA AND PAIN OF BOTH UPPER EXTREMITIES: ICD-10-CM

## 2022-03-24 DIAGNOSIS — M79.602 PARESTHESIA AND PAIN OF BOTH UPPER EXTREMITIES: ICD-10-CM

## 2022-03-24 DIAGNOSIS — M62.81 MUSCLE WEAKNESS OF LEFT UPPER EXTREMITY: ICD-10-CM

## 2022-03-24 DIAGNOSIS — R20.8 DECREASED SENSATION OF HAND AND UPPER EXTREMITY: ICD-10-CM

## 2022-03-24 DIAGNOSIS — R20.2 PARESTHESIA AND PAIN OF BOTH UPPER EXTREMITIES: ICD-10-CM

## 2022-03-24 PROCEDURE — 95910 NRV CNDJ TEST 7-8 STUDIES: CPT | Performed by: PSYCHIATRY & NEUROLOGY

## 2022-03-24 PROCEDURE — 95886 MUSC TEST DONE W/N TEST COMP: CPT | Performed by: PSYCHIATRY & NEUROLOGY

## 2022-03-24 NOTE — PROGRESS NOTES
EMG 2 Limb Upper Extremity     Date/Time 3/24/2022 2:12 PM     Performed by  Kamille Lechuga MD     Authorized by [facility] [place]            [facility] AT [address]  [phone]    Electromyography & Nerve Conduction Studies Report          Full Name: Jil Munguia Gender: Female  MRN: [id number] YOB: 1965      Visit Date: 3/24/2022 1:17 PM  Age: 65 Years  Examining Physician: Veronica Hawkins  Referring Physician: Sherene Baumgarten    Patient reports a history of numbness tingling and weakness in the left greater than right upper extremity  She has a previous history of cervical fusion from C4 through C7 and has a dorsal column stimulator in place  Temperature is 30°      Sensory Nerve Conduction Study       Nerve / Sites Rec  Site Onset Lat Peak Lat  Amp Segments Distance Peak Diff Velocity     ms ms µV  cm ms m/s   R Median - Dig II (Antidromic)  Wrist Index 4 2 5 3 20 2 Wrist - Index 14  33      Ref  ?3 4  ? 20 0 Ref  ?50   L Median - Dig II (Antidromic)  Wrist Index 5 5 6 8 6 0 Wrist - Index 14  25      Ref  ?3 4  ? 20 0 Ref  ?50   R Ulnar - Dig V (Antidromic)  Wrist Dig V 2 8 3 8 39 7 Wrist - Dig V 12  43      Ref  ?2 9  ? 17 0 Ref  ?50   L Ulnar - Dig V (Antidromic)  Wrist Dig V NR NR NR Wrist - Dig V 12  NR      Ref   ?2 9  ? 17 0 Ref  ?50   R Median, Ulnar - Palmar      Median Palm Wrist 2 5 3 4 17 3 Median Palm - Wrist 8  32      Ref  ?2 2 ? 50 0 Ref  ?50      Ulnar Palm Wrist 1 7 2 3 17 7 Ulnar Palm - Wrist 8  48      Ref  ?2 2 ? 12 0 Ref  ?50        Median Palm - Ulnar Palm  1 1         Ref  ?0 4    L Median, Ulnar - Palmar      Median Palm Wrist 4 4 4 8 2 4 Median Palm - Wrist 8  18      Ref  ?2 2 ? 50 0 Ref  ?50      Ulnar Palm Wrist 3 7 4 5 1 5 Ulnar Palm - Wrist 8  22      Ref  ?2 2 ? 12 0 Ref  ?50        Median Palm - Ulnar Palm  0 3         Ref    ?0 4    R Radial - Superficial (Antidromic)      Forearm Wrist 1 7 2 3 30 7 Forearm - Wrist 10  58      Ref  ?2 9 ? 15 0 Ref  ?50   L Radial - Superficial (Antidromic)      Forearm Wrist 1 8 2 7 27 9 Forearm - Wrist 10  55      Ref  ?2 9 ? 15 0 Ref  ?50       Motor Nerve Conduction Study       Nerve / Sites Muscle Latency Ref  Amplitude Ref  Segments Distance Lat Diff Velocity Ref  ms ms mV mV  cm ms m/s m/s   R Median - APB      Wrist APB 5 2 ?4 4 8 7 ?4 0 Wrist - APB 7         Elbow APB 9 0  9 2  Elbow - Wrist 20 3 79 53 ?49   L Median - APB      Wrist APB 7 8 ?4 4 9 6 ?4 0 Wrist - APB 7         Elbow APB 11 4  10 6  Elbow - Wrist 18 3 67 49 ?49   R Ulnar - ADM      Wrist ADM 2 9 ?3 3 9 2 ?6 0 Wrist - ADM 7         B  Elbow ADM 6 3  9 5  B  Elbow - Wrist 17 3 44 49 ?49      A  Elbow ADM 8 5  8 8  A  Elbow - B  Elbow 12 2 21 54 ?49   L Ulnar - ADM      Wrist ADM 8 6 ?3 3 0 5 ?6 0 Wrist - ADM 7         B  Elbow ADM 17 6  0 3  B  Elbow - Wrist 16 5 9 00 18 ?49      A  Elbow ADM 24 0  0 2  A  Elbow - B  Elbow 12 6 44 19 ?49       F Waves       Nerve F Latency Ref  ms ms   R Median - APB 30 9 ?31 0   R Ulnar - ADM 30 0 ?32 0   L Median - APB 36 9 ?31 0   L Ulnar - ADM 43 0 ?32 0       EMG Summary Table     Spontaneous MUAP Recruitment   Muscle Nerve Roots IA Fib PSW Fasc H F  Dur  Amp PPP Config Pattern   L  First dorsal interosseous Ulnar C8-T1 NL None None None None NL NL None NL NL                                         EMG BILATERAL UPPER EXTREMITIES    Motor and sensory conduction studies were performed on the median and ulnar nerves and radial sensory nerves bilaterally   The median distal motor latencies are prolonged at 5 2 and 7 8 milliseconds of the right left respectively and the left ulnar latency is significantly delayed at 8 6 milliseconds while the right ulnar distal motor latency was normal  The left ulnar motor action potential amplitude is severely reduced while the other motor action potential amplitudes were normal  Motor conduction velocities of the left ulnar nerve below the elbow was slow at 18 m/sec and across the elbow slow at 19 m/sec while the other were normal including conduction of the ulnar nerve across the elbow  The right median and ulnar F-waves were normal while the left  median and ulnar F waves were significantly prolonged  Median distal sensory latencies are prolonged at 4 2 and 5 5 milliseconds of the right left respectively with a delay and palmar stimulation  The sensory potential amplitudes were reduced on the right and normal on the left  The left ulnar distal sensory latency was not obtainable despite averaging while the right was normal with a normal action potential amplitude  The radial sensory latencies were normal bilaterally with normal sensory action potential amplitudes  Concentric needle EMG was performed in various distal and proximal muscles of the upper extremities bilaterally including APB, FDI, EDC, brachioradialis, biceps, triceps in the low cervical paraspinal region  There positive waves and fibrillation potentials noted at abductor digiti minimi on the left  No other spontaneous activities were seen  No response was obtainable in the left FDI region to volition  Rapid firing polyphasics potentials with reduced interference patterns were noted in the right FDI region, the [facility] region bilaterally the triceps regions bilaterally in the biceps regions bilaterally  The other compound motor unit potentials were of normal configuration and interference patterns were full or full for effort    IMPRESSION: This is an abnormal EMG of the bilateral upper extremities due to evidence of a localized neuropathic process involving the median nerve at the wrist bilaterally consistent with moderate to severe carpal tunnel syndrome with changes more severe neural physiologically on the left    In addition a localized neuropathic process involving the ulnar nerve at the wrist and at the elbow on the left side are noted with axonal and demyelinative change  Chronic denervation changes are noted in the C6-7 myotomal distribution consistent with chronic cervical polyradiculopathy      Carmencita Lennox, M D

## 2022-03-28 ENCOUNTER — OFFICE VISIT (OUTPATIENT)
Dept: NEUROSURGERY | Facility: CLINIC | Age: 57
End: 2022-03-28
Payer: MEDICARE

## 2022-03-28 VITALS
TEMPERATURE: 97.6 F | RESPIRATION RATE: 16 BRPM | HEIGHT: 62 IN | BODY MASS INDEX: 33.13 KG/M2 | WEIGHT: 180 LBS | DIASTOLIC BLOOD PRESSURE: 88 MMHG | HEART RATE: 95 BPM | SYSTOLIC BLOOD PRESSURE: 122 MMHG

## 2022-03-28 DIAGNOSIS — G89.4 CHRONIC PAIN SYNDROME: Primary | ICD-10-CM

## 2022-03-28 DIAGNOSIS — Z96.89 STATUS POST INSERTION OF SPINAL CORD STIMULATOR: ICD-10-CM

## 2022-03-28 DIAGNOSIS — M62.81 MUSCLE WEAKNESS OF LEFT UPPER EXTREMITY: ICD-10-CM

## 2022-03-28 PROCEDURE — 99213 OFFICE O/P EST LOW 20 MIN: CPT | Performed by: STUDENT IN AN ORGANIZED HEALTH CARE EDUCATION/TRAINING PROGRAM

## 2022-03-28 NOTE — PROGRESS NOTES
Assessment/Plan:    Chronic pain syndrome  As addressed in HPI    Status post insertion of spinal cord stimulator  As addressed in HPI    Muscle weakness of left upper extremity  · As addressed in HPI  · Cervical fusion with resultant straightening of cervical lordosis - this is likely contributing to patient's complaint of pain in cervical para spinal area, need to discuss with PM antispasmodic , may benefit for cervical myofascial injections, cervical massage,   and continue with physical therapy   · Remarked several times current opiate dosing schedule is not adequate , took last opiate dose this morning , has none remaining until she sees pain management tomorrow -advised to discuss with PM       Diagnostics /imagining     MRI cervical  3/24/22 Examination limited by the conditional requirements of the [facility] Scientific spinal cord stimulator resulting in alteration of imaging techniques  Grossly stable multifocal myelomalacia within the cervical cord  Stable postoperative change  Persistent mild foraminal narrowing at the C2-3 level and within the lower cervical spine  Xray cervical  Thoracolumbar  2/22/21 here is an intact plate and screw device from C5 through C7 with interspace disc graft at C6-7 and interspace disc prosthesis at C7-T1  There is mild thoracic degenerative change with disc space narrowing  There are postsurgical changes of the lumbar spine with posterior fusion from L3 through S1 and interspace disc prostheses at L4-5 and L5-S1  Hardware is intact  There are degenerative changes of the upper lumbar spine  EMG 2 Limb Upper Extremity Result Date: 3/24/2022  IMPRESSION: This is an abnormal EMG of the bilateral upper extremities due to evidence of a localized neuropathic process involving the median nerve at the wrist bilaterally consistent with moderate to severe carpal tunnel syndrome with changes more severe neural physiologically on the left    In addition a localized neuropathic process involving the ulnar nerve at the wrist and at the elbow on the left side are noted with axonal and demyelinative change  Chronic denervation changes are noted in the C6-7 myotomal distribution consistent with chronic cervical polyradiculopathy  Plan  · Collaborative image review and case discussion with Dr Brittany Shaw --no surgical intervention indicated , has optimized all neurosurgery procedures  · Reviewed EMG and imagining results with patient  · Continue with physical therapy   · [place] [facility] [place]   · Discuss with PM trying different antispasmodic tizanidine and cyclobenzaprine not efficacious, insurance will not pay for methocarbamol --consider soma, baclofen or diazepam   · Consider discussion w/ PM for medicinal mariguana    · Call neurosurgery with additional concerns or questions  Subjective: I have to do something to help control the pain  Patient ID: Troy Sousa is a 65 y o  female     HPI   Returns to office to review diagnostics , reassessment and discuss if surgery indicated     She has a longstanding history of chronic pain secondary to cervical and back problems , cervical and lumbar radiculopathy ,  She has spinal cord stimulators  Cervical percutaneous leads and and thoracic paddle  Prior to spinal cord stimulator implants she underwent surgery for lumbar multilevel fusion and cervical multilevel fusion C 3 -7  Her current complaint is muscle weakness in upper extremities left greater than right , cervical pain left lateral worse, cervical paraspinal muscle pain  , decreased sensation in upper extremities , wrist pain and numbness and tingling in fingers  She has a history of bilateral carpel tunnel disease , reports no Dr will operate on her  Secondary to high risk comorbid conditions     Paresthesia sensation bilateral upper extremities   She reports clumsiness and weakness in left hand   She complains of cervical stiffness and clicking   Most recent mJOA  1/31/2021 was  12  Demonstrated slow rapid finger movement   and finger to nose pass pointing  Has weak strength in both upper extremities  Reports subjective gait instability- need railing on stairs  She has stable myelomalacia   of cervical cord in several sites  She also complains on pain in posterior cervical area with distribution up into occiput  Diagnostics ordered at last visit  cervical spine imagining, to assess HW ,-and prior MRI indicated  Need to assess HW spacer  Concern for slippage, plain film evaluation  [place] be worthwhile to evaluate posterior slippage  MRI cervical spine , EMG bilateral upper extremities        I have spent 25 minutes with patient today in which greater than 50% of this time was spent in assessment, examination, impressions, reviewing imagining and recommendations for care  All questions were answered to his/her satisfaction, and contact information provided in the event additional questions arise  Patient acknowledged an understanding and agreement with plan               REVIEW OF SYSTEMS  Review of Systems   Constitutional: Positive for fatigue (chronic)  HENT: Positive for trouble swallowing  Scratchy dry throat post COVID   Eyes: Negative  Negative for visual disturbance  Respiratory: Positive for shortness of breath (occasional)  Cardiovascular: Negative  Gastrointestinal: Positive for diarrhea  Negative for abdominal pain and constipation  Endocrine: Positive for cold intolerance and heat intolerance  Cold sensation toes  Feels hot all the time   Genitourinary: Positive for difficulty urinating, frequency and urgency  Incontinence and leakage, especially when coughing/sneezing  Wetting bed at night   Musculoskeletal: Positive for arthralgias (Pain into the hips, buttocks and legs), back pain (constant across low back pain), gait problem, myalgias and neck pain ("burning" in neck   radiates to arms and hands)  Under breast down to feet, extreme pain/tightness   Skin: Negative  Negative for wound  Allergic/Immunologic: Positive for food allergies (strawberry/strawberry extract)  Neurological: Positive for weakness (All extremities, left hand worsening), numbness (All extremities, and body) and headaches (off and on)  Negative for dizziness, tremors, seizures and syncope  Paresthesias of hands  Unsteady balance  Complains of inability to use L>R hands   Hematological: Negative  Psychiatric/Behavioral: Positive for decreased concentration, dysphoric mood and sleep disturbance (due to pain  )  The patient is nervous/anxious  Meds/Allergies     Current Outpatient Medications   Medication Sig Dispense Refill    Camphor-Menthol-Methyl Sal (FLEXALL ULTRA PLUS EX) Salonpas      Citalopram Hydrobromide (CELEXA PO) Take 40 mg by mouth daily        DULoxetine (CYMBALTA) 60 mg delayed release capsule Take 60 mg by mouth 2 (two) times a day   fluticasone-vilanterol (Breo Ellipta) 100-25 mcg/inh inhaler Inhale 1 puff daily      gabapentin (NEURONTIN) 300 mg capsule Take 900 mg by mouth 3 (three) times a day 3 capsuless of 300mg TID      lisinopril (ZESTRIL) 20 mg tablet Take 20 mg by mouth 2 (two) times a day        Menthol, Topical Analgesic, (BIOFREEZE EX) Apply topically      Multiple Vitamins-Minerals (MULTIVITAMIN ADULT PO) daily      Multiple Vitamins-Minerals (multivitamin with minerals) tablet Take 1 tablet by mouth daily 30 tablet 1    omeprazole (PriLOSEC) 40 MG capsule Take 40 mg by mouth daily        oxyCODONE-acetaminophen (PERCOCET)  mg per tablet 4 (four) times a day      rOPINIRole (REQUIP) 2 mg tablet Take 2 mg by mouth daily at bedtime        ascorbic acid (VITAMIN C) 500 MG tablet Take 1 tablet (500 mg total) by mouth 2 (two) times a day (Patient not taking: Reported on 3/28/2022 ) 60 tablet 1    escitalopram (LEXAPRO) 10 mg tablet Take 10 mg by mouth daily (Patient not taking: Reported on 2/21/2022 )      famotidine (PEPCID) 40 MG tablet Take 40 mg by mouth 2 (two) times a day (Patient not taking: Reported on 3/28/2022 )      ferrous sulfate 324 (65 Fe) mg Take 1 tablet (324 mg total) by mouth 2 (two) times a day before meals (Patient not taking: Reported on 3/28/2022 ) 60 tablet 1    folic acid (FOLVITE) 1 mg tablet Take 1 tablet (1 mg total) by mouth daily (Patient not taking: Reported on 3/28/2022 ) 30 tablet 1     No current facility-administered medications for this visit  Allergies   Allergen Reactions    Strawberry Extract - Food Allergy Anaphylaxis    Contrast [Iodinated Diagnostic Agents] Hives     CT scan dye       PAST HISTORY    Past Medical History:   Diagnosis Date    Abdominal pain     Anxiety     Arthritis     Back pain     Breathing difficulty     Depression     Diarrhea     Dizziness     Edentulous     Fibromyalgia     Fibromyalgia     Full dentures     Upper and Lower  Can't wear right now due to recent surgery for sleep surgery    Heartburn     History of colon polyps     History of fall     History of HPV infection     Hypertension     IBS (irritable bowel syndrome)     Intermittent palpitations     Irregular heart beat     Leg pain, bilateral     Neuropathy     Bilateral hands, arms   Numbness tops of legs and buttocks and feet    Pneumonia     History of walking pneumonia multiple times    Restless leg syndrome     Seasonal allergies     Shortness of breath     Sleep apnea     History of- recent surgery to correct    Spinal stenosis     Urinary incontinence     Use of cane as ambulatory aid     Wears glasses        Past Surgical History:   Procedure Laterality Date    BLADDER SURGERY  02/10/2016    botox injections    BLOOD PATCH      post previous stim attempt   [address] [facility]  2011, 1997, 2004    x4 C3-4, C4-5, C5-6, C6-7    CHOLECYSTECTOMY  2000    lap ad    COLONOSCOPY      COLONOSCOPY N/A 5/6/2016    Procedure: COLONOSCOPY;  Surgeon: Gee Castillo MD;  Location: [facility]; Service:     GYNECOLOGIC CRYOSURGERY      MI ARTHRODESIS ANT INTERBODY MIN DISCECTOMY,LUMBAR N/A 9/13/2016    Procedure: FUSION LUMBAR INTERBODY ANTERIOR APPROACH  L4-5 L5-S1  WITH POSTERIOR INSTRUMENTATION ;  Surgeon: Gaurang Marie MD;  Location: [facility];  Service: Orthopedics    MI EXCISION [facility] N/A 8/24/2016    Procedure: Sharath Him;  Surgeon: Bernabe Nascimento MD;  [facility];  Service: ENT    MI PALATOPHAYNGOPLASTY N/A 8/24/2016    Procedure: UVULOPALATOPHARYNGOPLASTY (UPPP);   Surgeon: Bernabe Nascimento MD;  [facility];  Service: ENT    MI PERCUT IMPLNT Sharene Radon N/A 4/9/2021    Procedure: INSERTION CERVICAL DORSAL COLUMN SPINAL CORD STIMULATOR PERCUTANEOUS PERMANENT TRIAL;  Surgeon: Jay Browne MD;  Location:  MAIN OR;  Service: Neurosurgery    SKIN BIOPSY  2015    R arm, scalp- all benign    SPINAL CORD STIMULATOR IMPLANT      lumbar  in place   [facility]      cervical    SPINAL CORD STIMULATOR REMOVAL N/A 4/16/2021    Procedure: REMOVAL OF SPINAL CORD STIMULATOR, OR REMOVAL OF EXTENSIONS AND CONNECTION TO GENERATOR;  Surgeon: Jay Browne MD;  Location:  MAIN OR;  Service: Neurosurgery    TONSILLECTOMY      TUBAL LIGATION         Social History     Tobacco Use    Smoking status: Current Every Day Smoker     Packs/day: 1 00     Years: 38 00     Pack years: 38 00    Smokeless tobacco: Never Used    Tobacco comment: encouraged smoking cessation   Vaping Use    Vaping Use: Never used   Substance Use Topics    Alcohol use: No    Drug use: Yes     Frequency: 7 0 times per week     Types: Marijuana     Comment: CBD oil, THC       Family History   Problem Relation Age of Onset    Thyroid cancer Mother     Thyroid cancer Father     Alcohol abuse Sister     No Known Problems Brother     Cancer Family        The following portions of the patient's history were reviewed and updated as appropriate: allergies, current medications, past family history, past medical history, past social history, past surgical history and problem list       EXAM    Vitals:Blood pressure 122/88, pulse 95, temperature 97 6 °F (36 4 °C), temperature source Tympanic, resp  rate 16, height 5' 2" (1 575 m), weight 81 6 kg (180 lb), not currently breastfeeding  ,Body mass index is 32 92 kg/m²  Physical Exam  Vitals and nursing note reviewed  Exam conducted with a chaperone present ()  Constitutional:       Appearance: Normal appearance  Neck:      Comments: Limitation cervical   Musculoskeletal:         General: Tenderness (cervical , paraspinal cervical ) and deformity (slightly flexed fingers left hand) present  Right lower leg: No edema  Left lower leg: No edema  Comments: Limitation lumbar ROM    Skin:     General: Skin is warm and dry  Neurological:      Mental Status: She is alert  Mental status is at baseline  Motor: Weakness (extremituies ) present  Coordination: Finger-Nose-Finger Test abnormal       Gait: Gait is intact  Gait normal       Deep Tendon Reflexes:      Reflex Scores:       Tricep reflexes are 1+ on the right side and 1+ on the left side  Bicep reflexes are 1+ on the right side and 1+ on the left side  Patellar reflexes are 2+ on the right side and 2+ on the left side  Achilles reflexes are 1+ on the right side and 1+ on the left side    Psychiatric:      Comments: Sad tearful over condition, uncontrolled pain          Neurologic Exam     Motor Exam     Strength   Left neck extension: 4/5  Right deltoid: 4/5  Left deltoid: 4/5  Right biceps: 4/5  Left biceps: 4/5  Right triceps: 4/5  Left triceps: 4/5  Right wrist flexion: 4/5  Left wrist flexion: 4/5  Right wrist extension: 4/5  Left wrist extension: 4/5  Right interossei: 4/5  Left interossei: 4/5  Right iliopsoas: 4/5  Left iliopsoas: 4/5  Right quadriceps: 4/5  Left quadriceps: 4/5  Right hamstrin/5  Left hamstrin/5  Right anterior tibial: 5/5  Left anterior tibial: 5/5  Right gastroc: 5/5  Left gastroc: 5/5    Sensory Exam   Right arm light touch: decreased from fingers  Left arm light touch: decreased from fingers  Right arm vibration: decreased from fingers  Left arm vibration: decreased from fingers  Graphesthesia: abnormal right and abnormal left    Gait, Coordination, and Reflexes     Gait  Gait: normal    Coordination   Finger to nose coordination: abnormal    Reflexes   Right biceps: 1+  Left biceps: 1+  Right triceps: 1+  Left triceps: 1+  Right patellar: 2+  Left patellar: 2+  Right achilles: 1+  Left achilles: 1+  Right Champion: absent  Left Champion: absent  Right ankle clonus: absent  Left pendular knee jerk: absent      Imaging Studies  MRI cervical spine without contrast    Result Date: 3/24/2022  Narrative: MRI CERVICAL SPINE WITHOUT CONTRAST INDICATION: Z96 89: Presence of other specified functional implants M62 81: Muscle weakness (generalized) R20 8: Other disturbances of skin sensation R20 2: Paresthesia of skin M79 601: Pain in right arm M79 602: Pain in left arm  COMPARISON:  2021 TECHNIQUE: Patient has a [address] spinal cord stimulator with specific conditional requirements which were followed throughout this examination  These conditional requirements resulting in a change of our usual protocol  Sagittal single shot T2 and inversion recovery  Axial single shot fast spin-echo T2 and axial 3-D merge sequences were obtained  IMAGE QUALITY:  Quality of this examination is degraded by the conditional requirements of the spinal cord stimulator  FINDINGS: ALIGNMENT:  Straightening of the normal cervical lordosis  Grossly stable anterior fusion at C6-7 and C7-T1  No subluxation or compression fracture  MARROW SIGNAL:  Grossly unremarkable   CERVICAL AND VISUALIZED THORACIC CORD:  Limited evaluation of the cervical and thoracic CORD in this patient with known multifocal myelomalacia  Mild volume loss of the cervical cord  No gross change identified  PREVERTEBRAL AND PARASPINAL SOFT TISSUES:  Normal  VISUALIZED POSTERIOR FOSSA:  The visualized posterior fossa demonstrates no abnormal signal  CERVICAL DISC SPACES:  Limited evaluation of the disc spaces demonstrates stable multifocal mild canal stenosis with no new disc herniation  No new cord compression  Mild foraminal narrowing at the C2-3 level primarily related to facet degenerative change  Additional mild foraminal narrowing within the lower cervical spine and at the cervicothoracic junction on the left  UPPER THORACIC DISC SPACES:  Grossly unremarkable  Impression: Examination limited by the conditional requirements of the [facility] spinal cord stimulator resulting in alteration of imaging techniques  Grossly stable multifocal myelomalacia within the cervical cord  Stable postoperative change  Persistent mild foraminal narrowing at the C2-3 level and within the lower cervical spine  Workstation performed: [id number]     XR follow up    Result Date: 3/22/2022  Narrative: AP and lateral images of the cervical, thoracic, and lumbar spine were obtained for evaluation of stimulator leads  There are two stimulators present including a cervical stimulator with leads extending posterior to the C1/C2 vertebral bodies and generator over the right upper quadrant, and a thoracic stimulator with leads at the T7-T9 level with generator over the left sacrum  Leads are intact  There are postoperative changes in the cervical spine with fusion from C3 through T1  There is an intact plate and screw device from C5 through C7 with interspace disc graft at C6-7 and interspace disc prosthesis at C7-T1  There is mild thoracic degenerative change with disc space narrowing   There are postsurgical changes of the lumbar spine with posterior fusion from L3 through S1 and interspace disc prostheses at L4-5 and L5-S1  Hardware is intact  There are degenerative changes of the upper lumbar spine  Workstation performed: [id number]     EMG 2 Limb Upper Extremity Result Date: 3/24/2022  IMPRESSION: This is an abnormal EMG of the bilateral upper extremities due to evidence of a localized neuropathic process involving the median nerve at the wrist bilaterally consistent with moderate to severe carpal tunnel syndrome with changes more severe neural physiologically on the left  In addition a localized neuropathic process involving the ulnar nerve at the wrist and at the elbow on the left side are noted with axonal and demyelinative change  Chronic denervation changes are noted in the C6-7 myotomal distribution consistent with chronic cervical polyradiculopathy  Narrative: Zahra Velez MD     3/24/2022  3:05 PM   EMG 2 Limb Upper Extremity  Date/Time 3/24/2022 2:12 PM  Performed by  Zahra Velez MD  Authorized by MAIKEL Schwartz        I have personally reviewed pertinent reports     and I have personally reviewed pertinent films in PACS

## 2022-03-30 NOTE — ASSESSMENT & PLAN NOTE
· As addressed in HPI  · Cervical fusion with resultant straightening of cervical lordosis - this is likely contributing to patient's complaint of pain in cervical para spinal area, need to discuss with PM antispasmodic , may benefit for cervical myofascial injections, cervical massage,   and continue with physical therapy   · Remarked several times current opiate dosing schedule is not adequate , took last opiate dose this morning , has none remaining until she sees pain management tomorrow -advised to discuss with PM       Diagnostics /imagining     MRI cervical  3/24/22 Examination limited by the conditional requirements of the [facility] [employer] spinal cord stimulator resulting in alteration of imaging techniques  Grossly stable multifocal myelomalacia within the cervical cord  Stable postoperative change  Persistent mild foraminal narrowing at the C2-3 level and within the lower cervical spine  Xray cervical  Thoracolumbar  2/22/21 here is an intact plate and screw device from C5 through C7 with interspace disc graft at C6-7 and interspace disc prosthesis at C7-T1  There is mild thoracic degenerative change with disc space narrowing  There are postsurgical changes of the lumbar spine with posterior fusion from L3 through S1 and interspace disc prostheses at L4-5 and L5-S1  Hardware is intact  There are degenerative changes of the upper lumbar spine  EMG 2 Limb Upper Extremity Result Date: 3/24/2022  IMPRESSION: This is an abnormal EMG of the bilateral upper extremities due to evidence of a localized neuropathic process involving the median nerve at the wrist bilaterally consistent with moderate to severe carpal tunnel syndrome with changes more severe neural physiologically on the left  In addition a localized neuropathic process involving the ulnar nerve at the wrist and at the elbow on the left side are noted with axonal and demyelinative change    Chronic denervation changes are noted in the C6-7 myotomal distribution consistent with chronic cervical polyradiculopathy  Plan  · Collaborative image review and case discussion with Dr Danny Holland --no surgical intervention indicated , has optimized all neurosurgery procedures  · Reviewed EMG and imagining results with patient  · Continue with physical therapy   · Continue conservative management with Dr Yamila Patrick at [facility]   · Discuss with PM trying different antispasmodic tizanidine and cyclobenzaprine not efficacious, insurance will not pay for methocarbamol --consider soma, baclofen or diazepam   · Consider discussion w/ PM for medicinal mariguana    · Call neurosurgery with additional concerns or questions

## 2022-04-12 ENCOUNTER — PREP FOR PROCEDURE (OUTPATIENT)
Dept: OBGYN CLINIC | Facility: CLINIC | Age: 57
End: 2022-04-12

## 2022-04-20 ENCOUNTER — TELEPHONE (OUTPATIENT)
Dept: OBGYN CLINIC | Facility: HOSPITAL | Age: 57
End: 2022-04-20

## 2022-04-20 NOTE — TELEPHONE ENCOUNTER
Preoperative Elective Admission Assessment- spoke to patient    Medicare- [facility]    Living Situation:    Who does pt live with: spouse  What kind of home: ranch  How do they enter the home: front  How many levels in home: 1   # of steps to enter home: 3 to enter   Sleeping arrangement: first/entry floor  Where is Bathroom: First floor   Where is the tub or shower: Step in tub with grab bars and shower seat  First Floor Setup:   Is there a bathroom: Yes  Where would pt sleep: bed     DME: frame walker, cane and 3-in-1 bedside commode     Patient's Current Level of Function: Ambulates with cane and ADLs: Independent    Post-op Caregiver: spouse  Caregiver Name and phone number for Inpatient discharge needs: Zeke Santillan- [phone]   Currently receive any HHC/aides/community supports: No     Post-op Transport: spouse  To/from hospital: spouse  To/from PT 2-3x/week: spouse  Uses community transport now: No     Outpatient Physical Therapy Site:  Site: [place]  pre and post-op appts scheduled? Yes     Medication Management: self and out of bottle  Preferred Pharmacy for Post-op Medications: "[facility] [email or web address]   Blood Management Vitamin Regimen: Pt confirms taking as prescribed  Post-op anticoagulant: to be determined by surgical team postoperatively     DC Plan: Pt plans to be discharged home  Pt educated that our goal, if at all possible, is to appropriately discharge patient based off their post-op function while striving to maintain maximal independence  If possible, the goal is to discharge patient to home and for them to attend outpatient physical therapy  Barriers to DC identified preoperatively: Recent MRI showing spinal issues   Losing hand function     BMI: 32 92     [employer]: declined enrollment    Patient Education:  Pt educated on post-op pain, early mobilization (POD0), Average inpt LOS, OP PT goal   Patient educated that our goal is to appropriately discharge patient based off their post-op function while striving to maintain maximal independence  The goal is to discharge patient to home and for them to attend outpatient physical therapy

## 2022-04-26 ENCOUNTER — TELEPHONE (OUTPATIENT)
Dept: OBGYN CLINIC | Facility: CLINIC | Age: 57
End: 2022-04-26

## 2022-04-29 ENCOUNTER — APPOINTMENT (OUTPATIENT)
Dept: LAB | Facility: CLINIC | Age: 57
End: 2022-04-29
Payer: MEDICARE

## 2022-04-29 DIAGNOSIS — Z01.818 PREOP TESTING: ICD-10-CM

## 2022-04-29 DIAGNOSIS — Z01.812 PRE-OPERATIVE LABORATORY EXAMINATION: ICD-10-CM

## 2022-04-29 LAB
ABO GROUP BLD: NORMAL
ALBUMIN SERPL BCP-MCNC: 3.5 G/DL (ref 3.5–5)
ALP SERPL-CCNC: 64 U/L (ref 46–116)
ALT SERPL W P-5'-P-CCNC: 20 U/L (ref 12–78)
ANION GAP SERPL CALCULATED.3IONS-SCNC: 3 MMOL/L (ref 4–13)
APTT PPP: 33 SECONDS (ref 23–37)
AST SERPL W P-5'-P-CCNC: 20 U/L (ref 5–45)
BASOPHILS # BLD AUTO: 0.06 THOUSANDS/ΜL (ref 0–0.1)
BASOPHILS NFR BLD AUTO: 1 % (ref 0–1)
BILIRUB SERPL-MCNC: 0.37 MG/DL (ref 0.2–1)
BLD GP AB SCN SERPL QL: NEGATIVE
BUN SERPL-MCNC: 19 MG/DL (ref 5–25)
CALCIUM SERPL-MCNC: 9.4 MG/DL (ref 8.3–10.1)
CHLORIDE SERPL-SCNC: 108 MMOL/L (ref 100–108)
CO2 SERPL-SCNC: 30 MMOL/L (ref 21–32)
CREAT SERPL-MCNC: 1.16 MG/DL (ref 0.6–1.3)
EOSINOPHIL # BLD AUTO: 0.18 THOUSAND/ΜL (ref 0–0.61)
EOSINOPHIL NFR BLD AUTO: 2 % (ref 0–6)
ERYTHROCYTE [DISTWIDTH] IN BLOOD BY AUTOMATED COUNT: 14.2 % (ref 11.6–15.1)
EST. AVERAGE GLUCOSE BLD GHB EST-MCNC: 108 MG/DL
GFR SERPL CREATININE-BSD FRML MDRD: 52 ML/MIN/1.73SQ M
GLUCOSE P FAST SERPL-MCNC: 99 MG/DL (ref 65–99)
HBA1C MFR BLD: 5.4 %
HCT VFR BLD AUTO: 43.8 % (ref 34.8–46.1)
HGB BLD-MCNC: 13.9 G/DL (ref 11.5–15.4)
IMM GRANULOCYTES # BLD AUTO: 0.04 THOUSAND/UL (ref 0–0.2)
IMM GRANULOCYTES NFR BLD AUTO: 0 % (ref 0–2)
INR PPP: 0.97 (ref 0.84–1.19)
LYMPHOCYTES # BLD AUTO: 3.56 THOUSANDS/ΜL (ref 0.6–4.47)
LYMPHOCYTES NFR BLD AUTO: 33 % (ref 14–44)
MCH RBC QN AUTO: 27.8 PG (ref 26.8–34.3)
MCHC RBC AUTO-ENTMCNC: 31.7 G/DL (ref 31.4–37.4)
MCV RBC AUTO: 88 FL (ref 82–98)
MONOCYTES # BLD AUTO: 0.84 THOUSAND/ΜL (ref 0.17–1.22)
MONOCYTES NFR BLD AUTO: 8 % (ref 4–12)
NEUTROPHILS # BLD AUTO: 6.22 THOUSANDS/ΜL (ref 1.85–7.62)
NEUTS SEG NFR BLD AUTO: 56 % (ref 43–75)
NRBC BLD AUTO-RTO: 0 /100 WBCS
PLATELET # BLD AUTO: 269 THOUSANDS/UL (ref 149–390)
PMV BLD AUTO: 11.8 FL (ref 8.9–12.7)
POTASSIUM SERPL-SCNC: 3.6 MMOL/L (ref 3.5–5.3)
PROT SERPL-MCNC: 7.2 G/DL (ref 6.4–8.2)
PROTHROMBIN TIME: 12.5 SECONDS (ref 11.6–14.5)
RBC # BLD AUTO: 5 MILLION/UL (ref 3.81–5.12)
RH BLD: POSITIVE
SODIUM SERPL-SCNC: 141 MMOL/L (ref 136–145)
SPECIMEN EXPIRATION DATE: NORMAL
WBC # BLD AUTO: 10.9 THOUSAND/UL (ref 4.31–10.16)

## 2022-04-29 PROCEDURE — 86900 BLOOD TYPING SEROLOGIC ABO: CPT

## 2022-04-29 PROCEDURE — 85025 COMPLETE CBC W/AUTO DIFF WBC: CPT

## 2022-04-29 PROCEDURE — 36415 COLL VENOUS BLD VENIPUNCTURE: CPT

## 2022-04-29 PROCEDURE — 85730 THROMBOPLASTIN TIME PARTIAL: CPT

## 2022-04-29 PROCEDURE — 86901 BLOOD TYPING SEROLOGIC RH(D): CPT

## 2022-04-29 PROCEDURE — 86850 RBC ANTIBODY SCREEN: CPT

## 2022-04-29 PROCEDURE — 85610 PROTHROMBIN TIME: CPT

## 2022-04-29 PROCEDURE — 83036 HEMOGLOBIN GLYCOSYLATED A1C: CPT

## 2022-04-29 PROCEDURE — 80053 COMPREHEN METABOLIC PANEL: CPT

## 2022-05-04 ENCOUNTER — ANESTHESIA EVENT (OUTPATIENT)
Dept: PERIOP | Facility: HOSPITAL | Age: 57
End: 2022-05-04
Payer: MEDICARE

## 2022-05-04 RX ORDER — MAG HYDROX/ALUMINUM HYD/SIMETH 400-400-40
1 SUSPENSION, ORAL (FINAL DOSE FORM) ORAL DAILY
COMMUNITY

## 2022-05-04 NOTE — PRE-PROCEDURE INSTRUCTIONS
Pre-Surgery Instructions:   Medication Instructions    ascorbic acid (VITAMIN C) 500 MG tablet Hold day of surgery   Capsaicin-Menthol (SALONPAS GEL-PATCH HOT EX) Hold day of surgery   Cholecalciferol (Vitamin D3) 125 MCG (5000 UT) CAPS Stop taking 7 days prior to surgery   Citalopram Hydrobromide (CELEXA PO) Take day of surgery  with sips water    DULoxetine (CYMBALTA) 60 mg delayed release capsule Take day of surgery  with sips water    ferrous sulfate 324 (65 Fe) mg Hold day of surgery   fluticasone-vilanterol (Breo Ellipta) 100-25 mcg/inh inhaler Uses PRN- OK to take day of surgery    folic acid (FOLVITE) 1 mg tablet Hold day of surgery   gabapentin (NEURONTIN) 300 mg capsule Take day of surgery  with sips water    lisinopril (ZESTRIL) 20 mg tablet BRYANNA High Risk surgery Admit-Hold 5/17 and DOS 5/18 per Anesthesia guidelines    Menthol, Topical Analgesic, (BIOFREEZE EX) Hold day of surgery   omeprazole (PriLOSEC) 40 MG capsule Take day of surgery  with sips water    oxyCODONE-acetaminophen (PERCOCET)  mg per tablet Take day of surgery  with sips water    rOPINIRole (REQUIP) 2 mg tablet Take night before surgery    [facility] Patient Education reviewed with all questions answered  Pre op instructions per Jayme Tinoco protocol,medications per surgeon/anesthesia guidelines and showering instructions per Jayme Tinoco [facility]J protocol reviewed-Patient has hibiclens soap and wipes  As of 4/15/22  patient taking Folic Acid,Vitamin C,Iron and a Multivitamin and will continue until 5/17/22  Pt  Verbalized understanding of current visitor restrictions due to Covid and will clarify with nurse DOS  Instructed to avoid all ASA and OTC Vit/Supp 1 week prior to surgery and to avoid NSAIDs 3 days prior to surgery per anesthesia guidelines  Tylenol ok to take prn     No Alcohol 24 hours prior to surgery  Patient aware Lovenox or other Blood Thinner prescribed is for POST OP ONLY  Instructed to bring Back Stimulator  CHINYERE  Pt  Verbalized an understanding of all instructions reviewed and offers no concerns at this time

## 2022-05-11 ENCOUNTER — PREP FOR PROCEDURE (OUTPATIENT)
Dept: OBGYN CLINIC | Facility: CLINIC | Age: 57
End: 2022-05-11

## 2022-05-16 ENCOUNTER — PATIENT OUTREACH (OUTPATIENT)
Dept: OTHER | Facility: CLINIC | Age: 57
End: 2022-05-16

## 2022-05-17 NOTE — PROGRESS NOTES
PT Evaluation     Today's date: 2022  Patient name: Bess Landry  : 1965  MRN: [id number]  Referring provider: Marli Guajardo  Dx:   Encounter Diagnosis     ICD-10-CM    1  Primary osteoarthritis of left knee  M17 12    2  Left knee pain, unspecified chronicity  M25 562    3  S/P total knee arthroplasty, left  [id number]                   Assessment  Assessment details: Bess Landry is a 65 y o  female with a history of Covid-19, anxiety, arthritis, back pain, CKD, depression, dizziness, fibromyalgia, GERD, HTN, IBS, migraines, intermittent palpitations,RLS, allergies, SOB, sleep apnea, spinal stenosis, urinary incontinence, and BMI>30 that presents for a high complexity physical therapy initial evaluation  The patient demonstrates signs and symptoms consistent with left knee OA; L knee pain s/p L TKA surgery  During the examination the patient demonstrated decreased L LE strength, decreased L knee ROM, activity intolerance, gait dysfunction, and L knee pain  The patient's impairments are causing the following functional limitations: difficulty with prolonged standing, prolonged walking, walking on unlevel surfaces, difficulty squatting/kneeling, difficulty stair-climbing, and difficulty transferring from low surfaces  The patient's clinical presentation is unstable due to a number of participation restrictions, significant medial history, and functional limitation (FOTO 7% function)  The patient will benefit from skilled PT services to address impairments, work towards goals, and restore PLOF      Impairments: abnormal gait, abnormal or restricted ROM, activity intolerance, impaired balance, impaired physical strength, lacks appropriate home exercise program, pain with function and weight-bearing intolerance  Functional limitations: difficulty with prolonged standing, prolonged walking, walking on unlevel surfaces, difficulty squatting/kneeling, difficulty stair-climbing, and difficulty transferring from low surfaces  Symptom irritability: highBarriers to therapy: Medical Hx  Understanding of Dx/Px/POC: fair   Prognosis: fair  Prognosis details: Medical Hx    Goals    STG: Achieve in 4-6 weeks  1  Patient's L knee pain at worst less than 3/10 to allow for proper gait  2   Patient's L knee ROM improve by 20-75 degrees to improve squatting  3   L LE MMT improve to > 4+/5 all motions tested to improve ADL/recreational activities  4   Timed up and go score improve to less than 14 seconds to indicate improved balance/decreased falls risk    LTG:  Achieve in 6-12 weeks  1  Patient's knee FOTO score improve to > 55% to indicate a return to normal functioning  2   Patient achieve personal goal of walking without pain L knee  3  Patient to achieve independence with home exercise plan  Plan  Plan details: RE-ASSESS 1X/MONTH  Patient would benefit from: skilled physical therapy  Planned modality interventions: TENS, cryotherapy, thermotherapy: hydrocollator packs and electrical stimulation/Russian stimulation  Other planned modality interventions: IASTM/ NMES via e-stim w/ QS  Planned therapy interventions: joint mobilization, manual therapy, massage, neuromuscular re-education, patient education, postural training, self care, strengthening, stretching, therapeutic activities, therapeutic exercise, home exercise program, abdominal trunk stabilization, balance, balance/weight bearing training and gait training  Frequency: 1-3X/WK    Duration in weeks: 12  Plan of Care beginning date: 5/20/2022  Plan of Care expiration date: 8/19/2022  Treatment plan discussed with: PTA and patient        Subjective Evaluation    History of Present Illness  Date of surgery: 5/18/2022  Mechanism of injury: surgery  Mechanism of injury: Angella Sage is a 65 y o  female that presents to outpatient physical therapy with complaints of left knee pain and difficulty functioning s/p L TKA done by Dr Juanito Levin at [facility]  The patient reports no complications but severe difficulty with standing, walking, ADL;s, and sleeping    The patient's main goal for physical therapy is to be able to walk on it again with the extreme pain  Pain  Current pain ratin  At best pain ratin  At worst pain ratin  Location: L knee  Quality: tight, sharp and knife-like  Relieving factors: rest and medications  Aggravating factors: standing, walking, stair climbing and lifting  Progression: worsening    Social Support  Steps to enter house: yes  3  Stairs in house: no   L[facility] in: [facility]  Lives with: spouse    Employment status: not working  Hand dominance: right    Treatments  Current treatment: physical therapy  Discharged from (in last 30 days): inpatient hospitalization  Patient Goals  Patient goals for therapy: decreased pain, improved balance, increased motion, increased strength, independence with ADLs/IADLs, return to sport/leisure activities, decreased edema and return to work  Patient goal: walk without pain        Objective     Observations     Additional Observation Details  Patient's incisions inspected (staples all intact)  The patient was educated on caring for her incisions and watching for signs of infection  All patient questions related to the incisions were answered at this time  The patient's incisions were cleaned with alcohol swabs and redressed with guaze and wrapped with ACE  The patient had a area of swelling at the superior most part of her incision  The patient was advised to call the doctor if this worsens  Palpation   Left   Tenderness of the rectus femoris  Additional Palpation Details  No calf tenderness or skin change    Tenderness   Left Knee   Tenderness in the medial joint line  Neurological Testing     Sensation     Knee   Left Knee   Intact: light touch    Right Knee   Intact: light touch     Comments   Left light touch: numbness Lateral joint line knee       Active Range of Motion   Left Knee   Flexion: 90 (seated) degrees with pain  Extension: -30 degrees     Right Knee   Flexion: 120 degrees   Extension: -10 degrees     Strength/Myotome Testing     Left Hip   Planes of Motion   Flexion: 3-  Extension: 3-  Abduction: 3  Adduction: 3    Right Hip   Planes of Motion   Flexion: 5  Extension: 5  Abduction: 5  Adduction: 5    Left Knee   Flexion: 3+  Extension: 3-  Quadriceps contraction: poor    Right Knee   Flexion: 5  Extension: 5  Quadriceps contraction: good    Tests     Additional Tests Details  Gait impairments: Patient ambulates with RW WBAT B/L LE    The patient demonstrates slow gait speed, flexed trunk, unequal step lengths, decreased L knee flex/extension, and decreased heel-toe rollover L LE     TU seconds w/ RW  FOTO: 7% ( predicted 31%)                     Re-Evaluation:   PRECAUTIONS:SIGNIFICANT MEDICAL HX  Knee Specialty Daily Treatment Diary     Manual Therapy                Hamstring stretch  *      Prone Quad stretch        Patellar mobs        Knee stretch on edge of mat  *flex/ext          Ther Ex        Nu Step S=  *      Biodex Bike S=        Heel slides flex/abd X10       PROM knee        EXT BOARD        Prone knee flex        ANKLE PUMP X20       SLR all planes        Ball squeeze  *      LAQ X10       Hamstring stretch  *      Calf stretch  *      Standing hamstring curls  *              Mini Squat                TKE        Total gym squats (deep)        Neuro Re-ed        Tandem stand        GLUTEAL SET X10       QS X10       Wall slides with feet stagger  *      Bridges        Fitter board        Eccentric Leg press        Clam Shells        QS+ SLR        Country Homes        GAIT Training        sidestepping  *      Heel-toe amb        Mirror walking  * w/ RW      Ther Act        Amb up/down steps        Chair squats        Crate carry        Step ups            Modalities        CP  KNEE                          No code       The patient was given a new home exercise plan with written handout, pictures, and verbal instruction  The patient accepts and understands the new home activities

## 2022-05-18 ENCOUNTER — APPOINTMENT (OUTPATIENT)
Dept: RADIOLOGY | Facility: HOSPITAL | Age: 57
End: 2022-05-18
Payer: MEDICARE

## 2022-05-18 ENCOUNTER — ANESTHESIA (OUTPATIENT)
Dept: PERIOP | Facility: HOSPITAL | Age: 57
End: 2022-05-18
Payer: MEDICARE

## 2022-05-18 ENCOUNTER — HOSPITAL ENCOUNTER (OUTPATIENT)
Facility: HOSPITAL | Age: 57
Setting detail: OUTPATIENT SURGERY
Discharge: HOME | End: 2022-05-19
Attending: ORTHOPAEDIC SURGERY | Admitting: ORTHOPAEDIC SURGERY
Payer: MEDICARE

## 2022-05-18 DIAGNOSIS — N28.9 RENAL INSUFFICIENCY: ICD-10-CM

## 2022-05-18 DIAGNOSIS — T65.291A TOXIC EFFECT OF TOBACCO AND NICOTINE, ACCIDENTAL OR UNINTENTIONAL, INITIAL ENCOUNTER: ICD-10-CM

## 2022-05-18 DIAGNOSIS — M17.12 PRIMARY OSTEOARTHRITIS OF LEFT KNEE: Primary | ICD-10-CM

## 2022-05-18 PROBLEM — N18.31 STAGE 3A CHRONIC KIDNEY DISEASE (HCC): Status: ACTIVE | Noted: 2022-05-18

## 2022-05-18 PROCEDURE — 88305 TISSUE EXAM BY PATHOLOGIST: CPT | Performed by: PATHOLOGY

## 2022-05-18 PROCEDURE — C1713 ANCHOR/SCREW BN/BN,TIS/BN: HCPCS | Performed by: ORTHOPAEDIC SURGERY

## 2022-05-18 PROCEDURE — 73560 X-RAY EXAM OF KNEE 1 OR 2: CPT

## 2022-05-18 PROCEDURE — C1776 JOINT DEVICE (IMPLANTABLE): HCPCS | Performed by: ORTHOPAEDIC SURGERY

## 2022-05-18 PROCEDURE — 99220 PR INITIAL OBSERVATION CARE/DAY 70 MINUTES: CPT | Performed by: NURSE PRACTITIONER

## 2022-05-18 PROCEDURE — 88311 DECALCIFY TISSUE: CPT | Performed by: PATHOLOGY

## 2022-05-18 PROCEDURE — 27447 TOTAL KNEE ARTHROPLASTY: CPT | Performed by: ORTHOPAEDIC SURGERY

## 2022-05-18 PROCEDURE — 99024 POSTOP FOLLOW-UP VISIT: CPT | Performed by: ORTHOPAEDIC SURGERY

## 2022-05-18 PROCEDURE — 97167 OT EVAL HIGH COMPLEX 60 MIN: CPT

## 2022-05-18 PROCEDURE — 99204 OFFICE O/P NEW MOD 45 MIN: CPT | Performed by: NURSE PRACTITIONER

## 2022-05-18 PROCEDURE — 27447 TOTAL KNEE ARTHROPLASTY: CPT | Performed by: PHYSICIAN ASSISTANT

## 2022-05-18 PROCEDURE — 97163 PT EVAL HIGH COMPLEX 45 MIN: CPT

## 2022-05-18 DEVICE — IMPLANTABLE DEVICE
Type: IMPLANTABLE DEVICE | Site: KNEE | Status: FUNCTIONAL
Brand: NEXGEN® LEGACY®

## 2022-05-18 DEVICE — PALACOS® R IS A FAST-CURING, RADIOPAQUE, POLY(METHYL METHACRYLATE)-BASED BONE CEMENT.PALACOS ® R CONTAINS THE X-RAY CONTRAST MEDIUM ZIRCONIUM DIOXIDE. TO IMPROVE VISIBILITY IN THE SURGICAL FIELD PALACOS ® R HAS BEEN COLOURED WITH CHLOROPHYLL (E141). THE BONE CEMENT IS PREPARED DIRECTLY BEFORE USE BY MIXING A POLYMER POWDER COMPONENT WITH A LIQUID MONOMER COMPONENT. A DUCTILE DOUGH FORMS WHICH CURES WITHIN A FEW MINUTES.
Type: IMPLANTABLE DEVICE | Site: KNEE | Status: FUNCTIONAL
Brand: PALACOS®

## 2022-05-18 DEVICE — IMPLANTABLE DEVICE
Type: IMPLANTABLE DEVICE | Site: KNEE | Status: FUNCTIONAL
Brand: NEXGEN® LEGACY® GENDER SOLUTIONS™

## 2022-05-18 DEVICE — COMPONENT PATELLAR 8.5MM SZ 32 NEXGEN: Type: IMPLANTABLE DEVICE | Site: KNEE | Status: FUNCTIONAL

## 2022-05-18 DEVICE — PLUG STEM NEXGEN KNEE TAPER: Type: IMPLANTABLE DEVICE | Site: KNEE | Status: FUNCTIONAL

## 2022-05-18 DEVICE — IMPLANTABLE DEVICE
Type: IMPLANTABLE DEVICE | Site: KNEE | Status: FUNCTIONAL
Brand: NEXGEN®

## 2022-05-18 DEVICE — PALACOS® R+G IS A FAST SETTING POLYMER CONTAINING GENTAMICIN, FOR USE IN BONE SURGERY. MIXING OF THE TWO COMPONENT SYSTEM, CONSISTING OF A POWDER AND A LIQUID, INITIALLY PRODUCES A LIQUID AND THEN A PASTE, WHICH IS USED TO ANCHOR THE PROSTHESIS TO THE BONE. THE HARDENED BONE CEMENT ALLOWS STABLE FIXATION OF THE PROSTHESIS AND TRANSFERS ALL STRESSES PRODUCED IN A MOVEMENT TO THE BONE VIA THE LARGE INTERFACE. INSOLUBLE ZIRCONIUM DIOXIDE IS INCLUDED IN THE CEMENT POWDER AS AN X RAY CONTRAST MEDIUM. THE CHLOROPHYLL ADDITIVE SERVES AS OPTICAL MARKING OF THE BONE CEMENT AT THE SITE OF THE OPERATION.
Type: IMPLANTABLE DEVICE | Site: KNEE | Status: FUNCTIONAL
Brand: PALACOS®

## 2022-05-18 RX ORDER — CHLORHEXIDINE GLUCONATE 0.12 MG/ML
15 RINSE ORAL ONCE
Status: COMPLETED | OUTPATIENT
Start: 2022-05-18 | End: 2022-05-18

## 2022-05-18 RX ORDER — FLUTICASONE FUROATE AND VILANTEROL 100; 25 UG/1; UG/1
1 POWDER RESPIRATORY (INHALATION) DAILY
Status: DISCONTINUED | OUTPATIENT
Start: 2022-05-19 | End: 2022-05-19 | Stop reason: HOSPADM

## 2022-05-18 RX ORDER — GABAPENTIN 300 MG/1
900 CAPSULE ORAL 3 TIMES DAILY
Status: DISCONTINUED | OUTPATIENT
Start: 2022-05-18 | End: 2022-05-19 | Stop reason: HOSPADM

## 2022-05-18 RX ORDER — DOCUSATE SODIUM 100 MG/1
100 CAPSULE, LIQUID FILLED ORAL 2 TIMES DAILY
Status: DISCONTINUED | OUTPATIENT
Start: 2022-05-18 | End: 2022-05-19 | Stop reason: HOSPADM

## 2022-05-18 RX ORDER — LISINOPRIL 20 MG/1
20 TABLET ORAL 2 TIMES DAILY
Status: DISCONTINUED | OUTPATIENT
Start: 2022-05-18 | End: 2022-05-18

## 2022-05-18 RX ORDER — MELATONIN
5000 DAILY
Status: DISCONTINUED | OUTPATIENT
Start: 2022-05-18 | End: 2022-05-19 | Stop reason: HOSPADM

## 2022-05-18 RX ORDER — DULOXETIN HYDROCHLORIDE 60 MG/1
60 CAPSULE, DELAYED RELEASE ORAL 2 TIMES DAILY
Status: DISCONTINUED | OUTPATIENT
Start: 2022-05-18 | End: 2022-05-19 | Stop reason: HOSPADM

## 2022-05-18 RX ORDER — HYDRALAZINE HYDROCHLORIDE 20 MG/ML
5 INJECTION INTRAMUSCULAR; INTRAVENOUS EVERY 6 HOURS PRN
Status: DISCONTINUED | OUTPATIENT
Start: 2022-05-18 | End: 2022-05-19 | Stop reason: HOSPADM

## 2022-05-18 RX ORDER — AMLODIPINE BESYLATE 5 MG/1
5 TABLET ORAL DAILY
Status: DISCONTINUED | OUTPATIENT
Start: 2022-05-18 | End: 2022-05-19 | Stop reason: HOSPADM

## 2022-05-18 RX ORDER — FENTANYL CITRATE 50 UG/ML
INJECTION, SOLUTION INTRAMUSCULAR; INTRAVENOUS AS NEEDED
Status: DISCONTINUED | OUTPATIENT
Start: 2022-05-18 | End: 2022-05-18

## 2022-05-18 RX ORDER — HYDROMORPHONE HCL/PF 1 MG/ML
0.25 SYRINGE (ML) INJECTION
Status: DISCONTINUED | OUTPATIENT
Start: 2022-05-18 | End: 2022-05-18 | Stop reason: HOSPADM

## 2022-05-18 RX ORDER — FERROUS SULFATE 325(65) MG
325 TABLET ORAL 2 TIMES DAILY WITH MEALS
Status: DISCONTINUED | OUTPATIENT
Start: 2022-05-18 | End: 2022-05-19 | Stop reason: HOSPADM

## 2022-05-18 RX ORDER — FOLIC ACID 1 MG/1
1 TABLET ORAL DAILY
Status: DISCONTINUED | OUTPATIENT
Start: 2022-05-18 | End: 2022-05-19 | Stop reason: HOSPADM

## 2022-05-18 RX ORDER — PROPOFOL 10 MG/ML
INJECTION, EMULSION INTRAVENOUS AS NEEDED
Status: DISCONTINUED | OUTPATIENT
Start: 2022-05-18 | End: 2022-05-18

## 2022-05-18 RX ORDER — GABAPENTIN 300 MG/1
900 CAPSULE ORAL SEE ADMIN INSTRUCTIONS
Status: DISCONTINUED | OUTPATIENT
Start: 2022-05-18 | End: 2022-05-18

## 2022-05-18 RX ORDER — OXYCODONE HYDROCHLORIDE AND ACETAMINOPHEN 5; 325 MG/1; MG/1
2 TABLET ORAL EVERY 6 HOURS PRN
Status: DISCONTINUED | OUTPATIENT
Start: 2022-05-18 | End: 2022-05-19 | Stop reason: HOSPADM

## 2022-05-18 RX ORDER — ROPINIROLE 1 MG/1
2 TABLET, FILM COATED ORAL
Status: DISCONTINUED | OUTPATIENT
Start: 2022-05-18 | End: 2022-05-19 | Stop reason: HOSPADM

## 2022-05-18 RX ORDER — GLYCOPYRROLATE 0.2 MG/ML
INJECTION INTRAMUSCULAR; INTRAVENOUS AS NEEDED
Status: DISCONTINUED | OUTPATIENT
Start: 2022-05-18 | End: 2022-05-18

## 2022-05-18 RX ORDER — LIDOCAINE HYDROCHLORIDE 10 MG/ML
INJECTION, SOLUTION EPIDURAL; INFILTRATION; INTRACAUDAL; PERINEURAL AS NEEDED
Status: DISCONTINUED | OUTPATIENT
Start: 2022-05-18 | End: 2022-05-18

## 2022-05-18 RX ORDER — CEFAZOLIN SODIUM 2 G/50ML
2000 SOLUTION INTRAVENOUS ONCE
Status: COMPLETED | OUTPATIENT
Start: 2022-05-18 | End: 2022-05-18

## 2022-05-18 RX ORDER — DEXAMETHASONE SODIUM PHOSPHATE 10 MG/ML
INJECTION, SOLUTION INTRAMUSCULAR; INTRAVENOUS AS NEEDED
Status: DISCONTINUED | OUTPATIENT
Start: 2022-05-18 | End: 2022-05-18

## 2022-05-18 RX ORDER — HYDROMORPHONE HCL/PF 1 MG/ML
0.5 SYRINGE (ML) INJECTION
Status: DISCONTINUED | OUTPATIENT
Start: 2022-05-18 | End: 2022-05-19 | Stop reason: HOSPADM

## 2022-05-18 RX ORDER — CITALOPRAM 20 MG/1
40 TABLET ORAL
Status: DISCONTINUED | OUTPATIENT
Start: 2022-05-19 | End: 2022-05-19 | Stop reason: HOSPADM

## 2022-05-18 RX ORDER — HYDROMORPHONE HCL/PF 1 MG/ML
SYRINGE (ML) INJECTION AS NEEDED
Status: DISCONTINUED | OUTPATIENT
Start: 2022-05-18 | End: 2022-05-18

## 2022-05-18 RX ORDER — ROCURONIUM BROMIDE 10 MG/ML
INJECTION, SOLUTION INTRAVENOUS AS NEEDED
Status: DISCONTINUED | OUTPATIENT
Start: 2022-05-18 | End: 2022-05-18

## 2022-05-18 RX ORDER — FONDAPARINUX SODIUM 2.5 MG/.5ML
2.5 INJECTION SUBCUTANEOUS DAILY
Status: DISCONTINUED | OUTPATIENT
Start: 2022-05-19 | End: 2022-05-19 | Stop reason: HOSPADM

## 2022-05-18 RX ORDER — OXYCODONE HYDROCHLORIDE AND ACETAMINOPHEN 5; 325 MG/1; MG/1
1 TABLET ORAL EVERY 4 HOURS PRN
Status: DISCONTINUED | OUTPATIENT
Start: 2022-05-18 | End: 2022-05-19 | Stop reason: HOSPADM

## 2022-05-18 RX ORDER — ONDANSETRON 2 MG/ML
4 INJECTION INTRAMUSCULAR; INTRAVENOUS EVERY 6 HOURS PRN
Status: DISCONTINUED | OUTPATIENT
Start: 2022-05-18 | End: 2022-05-19 | Stop reason: HOSPADM

## 2022-05-18 RX ORDER — NEOSTIGMINE METHYLSULFATE 1 MG/ML
INJECTION INTRAVENOUS AS NEEDED
Status: DISCONTINUED | OUTPATIENT
Start: 2022-05-18 | End: 2022-05-18

## 2022-05-18 RX ORDER — LORAZEPAM 2 MG/ML
0.5 INJECTION INTRAMUSCULAR
Status: DISCONTINUED | OUTPATIENT
Start: 2022-05-18 | End: 2022-05-18 | Stop reason: HOSPADM

## 2022-05-18 RX ORDER — SODIUM CHLORIDE, SODIUM LACTATE, POTASSIUM CHLORIDE, CALCIUM CHLORIDE 600; 310; 30; 20 MG/100ML; MG/100ML; MG/100ML; MG/100ML
125 INJECTION, SOLUTION INTRAVENOUS CONTINUOUS
Status: DISCONTINUED | OUTPATIENT
Start: 2022-05-18 | End: 2022-05-18

## 2022-05-18 RX ORDER — FENTANYL CITRATE/PF 50 MCG/ML
50 SYRINGE (ML) INJECTION
Status: DISCONTINUED | OUTPATIENT
Start: 2022-05-18 | End: 2022-05-18 | Stop reason: HOSPADM

## 2022-05-18 RX ORDER — PANTOPRAZOLE SODIUM 40 MG/1
40 TABLET, DELAYED RELEASE ORAL
Status: DISCONTINUED | OUTPATIENT
Start: 2022-05-19 | End: 2022-05-19 | Stop reason: HOSPADM

## 2022-05-18 RX ORDER — ACETAMINOPHEN 325 MG/1
650 TABLET ORAL EVERY 4 HOURS PRN
Status: DISCONTINUED | OUTPATIENT
Start: 2022-05-18 | End: 2022-05-19 | Stop reason: HOSPADM

## 2022-05-18 RX ORDER — ONDANSETRON 2 MG/ML
4 INJECTION INTRAMUSCULAR; INTRAVENOUS ONCE AS NEEDED
Status: DISCONTINUED | OUTPATIENT
Start: 2022-05-18 | End: 2022-05-18 | Stop reason: HOSPADM

## 2022-05-18 RX ORDER — ASCORBIC ACID 500 MG
500 TABLET ORAL 2 TIMES DAILY
Status: DISCONTINUED | OUTPATIENT
Start: 2022-05-18 | End: 2022-05-19 | Stop reason: HOSPADM

## 2022-05-18 RX ORDER — MAGNESIUM HYDROXIDE/ALUMINUM HYDROXICE/SIMETHICONE 120; 1200; 1200 MG/30ML; MG/30ML; MG/30ML
30 SUSPENSION ORAL EVERY 6 HOURS PRN
Status: DISCONTINUED | OUTPATIENT
Start: 2022-05-18 | End: 2022-05-19 | Stop reason: HOSPADM

## 2022-05-18 RX ORDER — BUPIVACAINE HYDROCHLORIDE 5 MG/ML
INJECTION, SOLUTION PERINEURAL AS NEEDED
Status: DISCONTINUED | OUTPATIENT
Start: 2022-05-18 | End: 2022-05-18

## 2022-05-18 RX ORDER — LORAZEPAM 2 MG/ML
INJECTION INTRAMUSCULAR
Status: COMPLETED
Start: 2022-05-18 | End: 2022-05-18

## 2022-05-18 RX ORDER — CHLORHEXIDINE GLUCONATE 4 G/100ML
SOLUTION TOPICAL DAILY PRN
Status: DISCONTINUED | OUTPATIENT
Start: 2022-05-18 | End: 2022-05-18

## 2022-05-18 RX ORDER — CEFAZOLIN SODIUM 1 G/50ML
1000 SOLUTION INTRAVENOUS EVERY 8 HOURS
Status: COMPLETED | OUTPATIENT
Start: 2022-05-18 | End: 2022-05-19

## 2022-05-18 RX ORDER — MIDAZOLAM HYDROCHLORIDE 2 MG/2ML
INJECTION, SOLUTION INTRAMUSCULAR; INTRAVENOUS AS NEEDED
Status: DISCONTINUED | OUTPATIENT
Start: 2022-05-18 | End: 2022-05-18

## 2022-05-18 RX ORDER — PROMETHAZINE HYDROCHLORIDE 25 MG/ML
12.5 INJECTION, SOLUTION INTRAMUSCULAR; INTRAVENOUS
Status: DISCONTINUED | OUTPATIENT
Start: 2022-05-18 | End: 2022-05-18 | Stop reason: HOSPADM

## 2022-05-18 RX ADMIN — NEOSTIGMINE METHYLSULFATE 4 MG: 1 INJECTION, SOLUTION INTRAVENOUS at 11:14

## 2022-05-18 RX ADMIN — HYDROMORPHONE HYDROCHLORIDE 0.5 MG: 1 INJECTION, SOLUTION INTRAMUSCULAR; INTRAVENOUS; SUBCUTANEOUS at 11:10

## 2022-05-18 RX ADMIN — LIDOCAINE HYDROCHLORIDE 5 ML: 10 INJECTION, SOLUTION EPIDURAL; INFILTRATION; INTRACAUDAL; PERINEURAL at 09:37

## 2022-05-18 RX ADMIN — ROPINIROLE HYDROCHLORIDE 2 MG: 1 TABLET, FILM COATED ORAL at 21:15

## 2022-05-18 RX ADMIN — GABAPENTIN 900 MG: 300 CAPSULE ORAL at 15:01

## 2022-05-18 RX ADMIN — OXYCODONE HYDROCHLORIDE AND ACETAMINOPHEN 2 TABLET: 5; 325 TABLET ORAL at 14:57

## 2022-05-18 RX ADMIN — DULOXETINE 60 MG: 60 CAPSULE, DELAYED RELEASE ORAL at 21:15

## 2022-05-18 RX ADMIN — CEFAZOLIN SODIUM 1000 MG: 1 SOLUTION INTRAVENOUS at 17:42

## 2022-05-18 RX ADMIN — FOLIC ACID 1 MG: 1 TABLET ORAL at 15:00

## 2022-05-18 RX ADMIN — ROCURONIUM BROMIDE 50 MG: 10 SOLUTION INTRAVENOUS at 09:37

## 2022-05-18 RX ADMIN — HYDROMORPHONE HYDROCHLORIDE 0.5 MG: 1 INJECTION, SOLUTION INTRAMUSCULAR; INTRAVENOUS; SUBCUTANEOUS at 17:50

## 2022-05-18 RX ADMIN — AMLODIPINE BESYLATE 5 MG: 5 TABLET ORAL at 16:47

## 2022-05-18 RX ADMIN — SODIUM CHLORIDE, SODIUM LACTATE, POTASSIUM CHLORIDE, AND CALCIUM CHLORIDE 125 ML/HR: .6; .31; .03; .02 INJECTION, SOLUTION INTRAVENOUS at 08:27

## 2022-05-18 RX ADMIN — FENTANYL CITRATE 50 MCG: 50 INJECTION, SOLUTION INTRAMUSCULAR; INTRAVENOUS at 10:16

## 2022-05-18 RX ADMIN — LORAZEPAM 0.5 MG: 2 INJECTION INTRAMUSCULAR; INTRAVENOUS at 12:30

## 2022-05-18 RX ADMIN — HYDROMORPHONE HYDROCHLORIDE 0.5 MG: 1 INJECTION, SOLUTION INTRAMUSCULAR; INTRAVENOUS; SUBCUTANEOUS at 12:08

## 2022-05-18 RX ADMIN — CHLORHEXIDINE GLUCONATE 0.12% ORAL RINSE 15 ML: 1.2 LIQUID ORAL at 08:27

## 2022-05-18 RX ADMIN — BUPIVACAINE HYDROCHLORIDE 10 ML: 5 INJECTION, SOLUTION PERINEURAL at 09:10

## 2022-05-18 RX ADMIN — SODIUM CHLORIDE, SODIUM LACTATE, POTASSIUM CHLORIDE, AND CALCIUM CHLORIDE: .6; .31; .03; .02 INJECTION, SOLUTION INTRAVENOUS at 10:13

## 2022-05-18 RX ADMIN — DOCUSATE SODIUM 100 MG: 100 CAPSULE, LIQUID FILLED ORAL at 17:42

## 2022-05-18 RX ADMIN — FENTANYL CITRATE 50 MCG: 50 INJECTION, SOLUTION INTRAMUSCULAR; INTRAVENOUS at 12:23

## 2022-05-18 RX ADMIN — FENTANYL CITRATE 50 MCG: 50 INJECTION, SOLUTION INTRAMUSCULAR; INTRAVENOUS at 09:12

## 2022-05-18 RX ADMIN — OXYCODONE HYDROCHLORIDE AND ACETAMINOPHEN 500 MG: 500 TABLET ORAL at 15:00

## 2022-05-18 RX ADMIN — FENTANYL CITRATE 50 MCG: 50 INJECTION, SOLUTION INTRAMUSCULAR; INTRAVENOUS at 11:22

## 2022-05-18 RX ADMIN — FENTANYL CITRATE 50 MCG: 50 INJECTION, SOLUTION INTRAMUSCULAR; INTRAVENOUS at 10:11

## 2022-05-18 RX ADMIN — OXYCODONE HYDROCHLORIDE AND ACETAMINOPHEN 2 TABLET: 5; 325 TABLET ORAL at 21:14

## 2022-05-18 RX ADMIN — FENTANYL CITRATE 50 MCG: 50 INJECTION, SOLUTION INTRAMUSCULAR; INTRAVENOUS at 09:05

## 2022-05-18 RX ADMIN — GLYCOPYRROLATE 0.4 MG: 0.2 INJECTION, SOLUTION INTRAMUSCULAR; INTRAVENOUS at 11:14

## 2022-05-18 RX ADMIN — OXYCODONE HYDROCHLORIDE AND ACETAMINOPHEN 500 MG: 500 TABLET ORAL at 21:15

## 2022-05-18 RX ADMIN — PROPOFOL 200 MG: 10 INJECTION, EMULSION INTRAVENOUS at 09:37

## 2022-05-18 RX ADMIN — Medication 1 TABLET: at 14:59

## 2022-05-18 RX ADMIN — FERROUS SULFATE TAB 325 MG (65 MG ELEMENTAL FE) 325 MG: 325 (65 FE) TAB at 16:47

## 2022-05-18 RX ADMIN — HYDROMORPHONE HYDROCHLORIDE 0.5 MG: 1 INJECTION, SOLUTION INTRAMUSCULAR; INTRAVENOUS; SUBCUTANEOUS at 11:27

## 2022-05-18 RX ADMIN — HYDROMORPHONE HYDROCHLORIDE 0.25 MG: 1 INJECTION, SOLUTION INTRAMUSCULAR; INTRAVENOUS; SUBCUTANEOUS at 12:06

## 2022-05-18 RX ADMIN — LISINOPRIL 20 MG: 20 TABLET ORAL at 15:00

## 2022-05-18 RX ADMIN — FENTANYL CITRATE 50 MCG: 50 INJECTION, SOLUTION INTRAMUSCULAR; INTRAVENOUS at 11:24

## 2022-05-18 RX ADMIN — DEXAMETHASONE SODIUM PHOSPHATE 10 MG: 10 INJECTION INTRAMUSCULAR; INTRAVENOUS at 09:40

## 2022-05-18 RX ADMIN — HYDROMORPHONE HYDROCHLORIDE 0.5 MG: 1 INJECTION, SOLUTION INTRAMUSCULAR; INTRAVENOUS; SUBCUTANEOUS at 22:22

## 2022-05-18 RX ADMIN — FENTANYL CITRATE 50 MCG: 50 INJECTION, SOLUTION INTRAMUSCULAR; INTRAVENOUS at 12:35

## 2022-05-18 RX ADMIN — MIDAZOLAM HYDROCHLORIDE 2 MG: 1 INJECTION, SOLUTION INTRAMUSCULAR; INTRAVENOUS at 09:05

## 2022-05-18 RX ADMIN — MIDAZOLAM HYDROCHLORIDE 2 MG: 1 INJECTION, SOLUTION INTRAMUSCULAR; INTRAVENOUS at 11:44

## 2022-05-18 RX ADMIN — CEFAZOLIN SODIUM 2000 MG: 2 SOLUTION INTRAVENOUS at 09:32

## 2022-05-18 RX ADMIN — BUPIVACAINE HYDROCHLORIDE 10 ML: 5 INJECTION, SOLUTION PERINEURAL at 09:15

## 2022-05-18 RX ADMIN — GABAPENTIN 900 MG: 300 CAPSULE ORAL at 21:14

## 2022-05-18 RX ADMIN — LORAZEPAM 0.5 MG: 2 INJECTION INTRAMUSCULAR; INTRAVENOUS at 12:21

## 2022-05-18 RX ADMIN — Medication 5000 UNITS: at 15:01

## 2022-05-18 NOTE — PLAN OF CARE
Problem: PHYSICAL THERAPY ADULT  Goal: Performs mobility at highest level of function for planned discharge setting  See evaluation for individualized goals  Description: Treatment/Interventions: Functional transfer training, LE strengthening/ROM, Elevations, Therapeutic exercise, Endurance training, Patient/family training, Equipment eval/education, Bed mobility, Gait training, Spoke to nursing, OT  Equipment Recommended: Zuleika Alex (has own available)       See flowsheet documentation for full assessment, interventions and recommendations  Note: Prognosis: Good  Problem List: Decreased strength, Decreased endurance, Impaired balance, Decreased mobility, Decreased coordination, Decreased skin integrity, Orthopedic restrictions, Pain  Assessment: Pt is 65 y o  female seen for PT evaluation s/p admit to [facility] on 5/18/2022 w/ Arthritis of left knee  PT consulted to assess pt's functional mobility and d/c needs  Order placed for PT eval and tx, w/ FWB LLE, Activity-Beginning POD#0, Dangle at bedside order  Comorbidities affecting pt's physical performance at time of assessment include: weakness,arthritis L knee POD 0 L TKA as per Dr La Weir, anxiety,fibromyalgia, HTN,chronic pain syndrome  PTA, pt was independent w/ all functional mobility w/ SPC usage  Personal factors affecting pt at time of IE include: stairs to enter home, inability to ambulate household distances, inability to navigate community distances, inability to navigate level surfaces w/o external assistance, unable to perform dynamic tasks in community, positive fall history, anxiety, unable to perform physical activity, limited insight into impairments and inability to perform ADLs   Please find objective findings from PT assessment regarding body systems outlined above with impairments and limitations including weakness, decreased ROM, impaired balance, decreased endurance, impaired coordination, gait deviations, pain, decreased activity tolerance, decreased functional mobility tolerance, fall risk, orthopedic restrictions and decreased skin integrity  From PT/mobility standpoint, recommendation at time of d/c would be home with outpatient rehabilitation pending progress in order to facilitate return to PLOF  Barriers to Discharge: Inaccessible home environment        PT Discharge Recommendation: Home with outpatient rehabilitation          See flowsheet documentation for full assessment

## 2022-05-18 NOTE — ASSESSMENT & PLAN NOTE
· Patient status post insertion of a spinal cord stimulator and h/o muscle weakness of left upper extremity   · Follows with neurosurgery outpatient   · Continue supportive care   · Continue with gabapentin 900 mg t i d  And p r n   Percocet

## 2022-05-18 NOTE — PLAN OF CARE
Problem: OCCUPATIONAL THERAPY ADULT  Goal: Performs self-care activities at highest level of function for planned discharge setting  See evaluation for individualized goals  Description: Treatment Interventions: ADL retraining, Functional transfer training, UE strengthening/ROM, Endurance training, Patient/family training, Equipment evaluation/education, Compensatory technique education, Fine motor coordination activities, Neuromuscular reeducation, Activityengagement, Energy conservation  Equipment Recommended: Bedside commode       See flowsheet documentation for full assessment, interventions and recommendations  Note: Limitation: Decreased ADL status, Decreased UE strength, Decreased endurance, Decreased sensation, Decreased fine motor control, Decreased self-care trans, Decreased high-level ADLs  Prognosis: Good  Assessment: Patient is a 65 y o  female seen for OT evaluation s/p admit to [place] [id number] on 5/18/2022 w/Arthritis of left knee  Commorbidities affecting patient's functional performance at time of assessment include: arthritis, CKD, fibromyalgia, IBS, RLS, and spinal stenosis  Orders placed for OT evaluation and treatment and acitivty-beginning POD #0  Performed at least two patient identifiers during session including name and wristband  Prior to admission, Patient reporting being independent with ADLs, ambulatory with [facility] and lives with  in a one level house  Personal factors affecting patient at time of initial evaluation include: steps to enter, difficulty performing ADLs and difficulty performing IADLs  Upon evaluation, patient requires supervision assist for UB ADLs, minimal  and moderate assist for LB ADLs, transfers and functional ambulation in room and bathroom with minimal  assist, with the use of Rolling Walker    Patient is oriented x 4 and presents with ability to recognize a problem, define a problem, identify alternative plan, select a plan, organize steps in a plan, implement plan and evaluate outcome (problem solving)  Occupational performance is affected by the following deficits: decreased muscle strength, impaired fine motor coordination, dynamic sit/ stand balance deficit with poor standing tolerance time for self care and functional mobility, decreased activity tolerance, impaired sensation, increased pain and delayed righting and equilibrium reactions  Based on the mentioned OT evaluation outcomes, functional performance deficits, and assessment findings, pt has been identified as a high complexity evaluation  Patient to benefit from continued Occupational Therapy treatment while in the hospital to address deficits as defined above and maximize level of functional independence with ADLs and functional mobility  Occupational Performance areas to address include: grooming , bathing/ shower, dressing, toilet hygiene, transfer to all surfaces and functional ambulation  From OT standpoint, recommendation at time of d/c would be Home with home health rehabilitation       OT Discharge Recommendation: Home with home health rehabilitation  OT - OK to Discharge: Yes (Once medically cleared)     Devon Jung OT

## 2022-05-18 NOTE — ASSESSMENT & PLAN NOTE
· BP elevated suspected to be due to pain   · Lisinopril is being held per renal recommendation   · Continue with amlodipine for now and will adjust medication as needed

## 2022-05-18 NOTE — OP NOTE
OPERATIVE REPORT  PATIENT NAME: Richard Gonsales    :  1965  MRN: [id number]  Pt Location: [facility] OR ROOM 01    SURGERY DATE: 2022    Surgeon(s) and Role:     * Leo Harmon DO - Primary     * Qian Guerin PA-C - Assisting    Preop Diagnosis:  Primary osteoarthritis of left knee [M17 12]    Post-Op Diagnosis Codes:     * Primary osteoarthritis of left knee [M17 12]    Procedure(s) (LRB):  KNEE TOTAL ARTHROPLASTY (Left) using the [employer] system with the following sizes:  E LPS GSF femoral component, #3 Tibial tray, 10 mm polyarticular surface, and a 32 mm patella button    Specimen(s):  ID Type Source Tests Collected by Time Destination   1 : Left knee bone and tissue Tissue Joint, Left Knee TISSUE EXAM Leo Harmon DO 2022 0915     Bone/synovium    Estimated Blood Loss:   50 cc    Drains:  solcotrans    Anesthesia Type:   General with iPACK and adductor canal blocks    Operative Indications:  Primary osteoarthritis of left knee [M17 12]      Operative Findings:  TT: 76    Complications:   None    Procedure and Technique:    The patient was properly identified and brought to the operative suite  After successful induction of the anesthetic, a  tourniquet was placed on the patient's left proximal thigh  The left lower extremity was prepped and draped in the usual usual sterile fashion  It was medically necessary that the physician's assistant be in the room to aid in positioning placing the appropriate amount of retraction the patient  A qualified resident was not available  The physician assistant's role was very integral to the success of this case  He assisted on positioning, draping, retraction soft tissue, retraction neurovascular bundles, assisted with implantation of prosthesis, assisted with reduction of prosthesis, and assisted with closure of a complex wound      She was given a dose of intravenous antibiotics    Surgical landmarks were outline  With  a skin marker  An Esmarch was used to examine the left lower extremity and the tourniquet was then inflated  Using a #10  Scalpel  Blade, an anterior incision was made on patient's left knee  Hemostasis was achieved with the use of electrocautery  Through a significant amount of dissection through adipose tissue the capsule and  quadricep tendon  Was  then located  Using a 2nd #10  Scalpel blade a medial parapatellar arthrotomy did occur with rush of clear synovial fluid  A posteriormedial sleeve was developed to the level of the semi membranous  tendon  The patella then everted and  knee was flexed  The retro and  superior fat pads were excised  The cruciate ligaments were  divided  At this point the proximal tibia could  be anteriorly subluxed  An intramedullary drill hole was placed in the proximal tibia, followed by the external cutting jig  The  smallest portion of deficient medial side bone was to  be removed, with its corresponding lateral side  Varus and  valgus angulation was also checked  The collateral ligaments were protected  The neurovascular bundle was then protected  The proximal tibia was then osteotomized  Attention then paid to the preparation of distal femur  The intramedullary thao was placed with a 6 degree valgus cut placed approximately 3° of external rotation  The external  cutting jig was placed on top of this  An additional 2 mm cut the because of a flexion contracture  The collateral ligaments were protected  The  distal femur was then osteotomized  The femur was then sized  A size E did a best fit  Both gender and  standard cutting blocks were checked and a gender specific  cutting block did  Have the best fit  Without any excessive notching of the anterior condyle  The collaterals were then protected, the distal femur was an osteotomized in the  following sequence 1 anterior condyle 2  posterior condyle 3 posterior chamfer and 4 anterior chamfer    At this point all the bone fragments and then removed  A lamina  was placed both the medial and lateral sides and the redundant menisci and posterior osteophytes were then removed  Flexion-extension blocks were placed next and a 10 mm block gave good symmetric balancing  The femur was then finished with the trochlear recess and notch block placed in a slightly lateral position to facilitate tracking patella  The tibial was sized next  A size #3 did have the best fit  The size E LPS LPS femoral component placed, followed by a #3  Tibial tray, followed by several polyarticular services and a 10 mm tray did the best fit  The rotation of the  tibia was then marked  The patella was inspected next  There was a tremendous amount of arthrosis  The  peripheral osteophytes were removed  It was confirmed with a caliper that there is adequate bone stock  And then using the [employer] reaming system approximately 9 mm of undersurface patella then reamed  The patella sized to a 32 mm patella button  This guide was drilled in a slightly superior medial position facilitate tracking patella  The tibia was then finished with a drill hole drill hole and keel punch  At this point there was good stability and range of motion in the knee  All the trial components removed  The wound was copiously irrigated sterile antibiotic solution  Cement  was being mixed on the back table was used 3rd generation cementing techniques  The proximal tibia cemented in placed followed by distal femur  A trial poly articular surface was placed till the cement fully cured  The patella was  cemented in place and held with a patellar clamp till the cement fully cured as well  Once the cement fully cured, it was irrigated  The polyarticular surfaces were  tried once again and a 10 mm tray to the best fit  The final 10 mm tray was placed reduced 1 final time and found to be quite stable    After it was irrigated,  a quarter-inch solcotrans  Was  placed near the superior aspect  Of the incision  The quadriceps  and capsule were closed with #1  Vicryl  Deep layer fascia closed multiple layers of 0 Vicryl  Superficial was closed with 2-0 Vicryl  The skin was approximated  With staples  The wounds were dressed with ointment Xeroform 4x4s ABDs sterile Webril and Ace bandage  There was no complication during the  procedure  She was successfully awoken,  transferred  To the hospital bed,  And went to the recovery room stable  In condition            I was present for the entire procedure, A qualified resident physician was not available and A physician assistant was required during the procedure for retraction tissue handling,dissection and suturing    Patient Disposition:  PACU       SIGNATURE: Raquel Kimbrough DO  DATE: May 18, 2022  TIME: 9:35 AM

## 2022-05-18 NOTE — NURSING NOTE
I talked to Dr Julio Phelps and he said pt could turn on her both nerve stimulator devices now-it could help her with her postop pain

## 2022-05-18 NOTE — ASSESSMENT & PLAN NOTE
· Underwent an elective left total knee arthroplasty on 5/18/2022  · Treatment per primary team   · Continue with pain management   · PT/OT evaluation   · Fondaparinux for DVT prophylaxis

## 2022-05-18 NOTE — ASSESSMENT & PLAN NOTE
Lab Results   Component Value Date    EGFR 52 04/29/2022    EGFR 59 03/30/2021    EGFR 44 5 09/13/2016    CREATININE 1 16 04/29/2022    CREATININE 1 07 03/30/2021    CREATININE 1 27 09/13/2016   · Baseline creatinine is around 1 1-1 3 mg/dL  · Nephrology input appreciated  · Recommend to hold off on lisinopril postop  Amlodipine started     · Avoid hypotension, nephrotoxins  · Monitor BMP closely

## 2022-05-18 NOTE — OCCUPATIONAL THERAPY NOTE
Occupational Therapy Evaluation      Richard Never    5/18/2022    Patient Active Problem List   Diagnosis    Obstructive sleep apnea syndrome    History of arthritis    History of gastrointestinal disease    History of hypertension    Lumbar spinal stenosis    Chronic pain syndrome    Neck pain    Back pain    Obesity (BMI 30-39  9)    Smoking    Anxiety    Fibromyalgia    Depression    HTN (hypertension)    Osteoarthritis of cervical spine with myelopathy    Status post insertion of spinal cord stimulator    Muscle weakness of left upper extremity    Arthritis of left knee       Past Medical History:   Diagnosis Date    Abdominal pain     Ambulates with cane     Anxiety     Arthritis     Back pain     Breathing difficulty     " Waking Up with trouble breathing" -Post MRI patient had issues    Chronic kidney disease     COPD (chronic obstructive pulmonary disease) ([facility] [address] )     COVID-19 long hauler     Patient states her PCP stated that she has this-Uses inhaler as needed    Depression     Diarrhea     Dizziness     Edentulous     Fibromyalgia     Fibromyalgia, primary     Full dentures     Upper and Lower  Can't wear right now due to recent surgery for sleep surgery    GERD (gastroesophageal reflux disease)     Heartburn     History of colon polyps     History of COVID-19 02/21/2022    Cough,fever,vomiting,diahhrea,HA and sore throat-Not Hospitalized    History of fall     History of HPV infection     Hypertension     IBS (irritable bowel syndrome)     Intermittent palpitations     Tachycardia    Leg pain, bilateral     Migraines     Neuropathy     Bilateral hands, arms   Numbness tops of legs and buttocks and feet    Pneumonia     History of walking pneumonia multiple times    Restless leg syndrome     Seasonal allergies     Shortness of breath     Sleep apnea     History of- recent surgery to correct    Spinal stenosis     Narrowing    Urinary incontinence     Use of cane as ambulatory aid     Wears glasses Past Surgical History:   Procedure Laterality Date    BLADDER SURGERY  02/10/2016    botox injections    BLOOD PATCH      post previous stim attempt    CERVICAL FUSION  2011, 1997, 2004    x4 C3-4, C4-5, C5-6, C6-7    CHOLECYSTECTOMY  2000    lap ad    COLONOSCOPY      COLONOSCOPY N/A 5/6/2016    Procedure: COLONOSCOPY;  Surgeon: Abida Canas MD;  Location: [facility]; Service:     GYNECOLOGIC CRYOSURGERY      WI ARTHRODESIS ANT INTERBODY MIN DISCECTOMY,LUMBAR N/A 9/13/2016    Procedure: FUSION LUMBAR INTERBODY ANTERIOR APPROACH  L4-5 L5-S1  WITH POSTERIOR INSTRUMENTATION ;  Surgeon: Brielle Salinas MD;  Location: [facility];  Service: Orthopedics    WI EXCISION [facility] N/A 8/24/2016    Procedure: Joann [place];  Surgeon: Mitzi Hernandez MD;  Location: [facility];  Service: ENT    WI PALATOPHAYNGOPLASTY N/A 8/24/2016    Procedure: UVULOPALATOPHARYNGOPLASTY (UPPP); Surgeon: Mitzi Hernandez MD;  [facility];  Service: ENT    WI PERCUT IMPLNT [address] N/A 4/9/2021    Procedure: INSERTION CERVICAL DORSAL COLUMN SPINAL CORD STIMULATOR PERCUTANEOUS PERMANENT TRIAL;  Surgeon: Vale Choudhary MD;  [facility];  Service: Neurosurgery    SKIN BIOPSY  2015    R arm, scalp- all benign    SPINAL CORD STIMULATOR IMPLANT      lumbar  in place    SPINAL CORD STIMULATOR REMOVAL      cervical    SPINAL CORD STIMULATOR REMOVAL N/A 4/16/2021    Procedure: REMOVAL OF SPINAL CORD STIMULATOR, OR REMOVAL OF EXTENSIONS AND CONNECTION TO GENERATOR;  Surgeon: Vale Choudhary MD;  Location[facility];  Service: Neurosurgery    TONSILLECTOMY      TUBAL LIGATION          05/18/22 1410   OT Last Visit   OT Visit Date 05/18/22   Note Type   Note type Evaluation   Additional Comments Pt agreeable to OT eval  Upon arrival pt supine in bed with HOB elevated     Restrictions/Precautions   Weight Bearing Precautions Per Order Yes   LLE Weight Bearing Per Order FWB   Other Precautions Fall Risk;Multiple lines;Telemetry;O2;Pain;WBS; Bed Alarm; Chair Alarm   Pain Assessment   Pain Assessment Tool 0-10   Pain Score 10 - Worst Possible Pain   Pain Location/Orientation Orientation: Left; Location: Knee   Pain Onset/Description Onset: Ongoing;Frequency: Constant/Continuous; Descriptor: [place] 14 Pain Intervention(s) Ambulation/increased activity;Repositioned;Medication (See MAR)   Home Living   Type of [address] One level;Performs ADLs on one level; Able to live on main level with bedroom/bathroom;Stairs to enter with rails  (3 [address]OL[address])   Bathroom Shower/Tub Tub/shower unit   Bathroom Toilet Standard   Bathroom Equipment Grab bars in shower; Shower chair   [address] Walker;Cane;Wheelchair-manual  (utilizes [facility] at baseline)   Prior Function   Level of [place] Independent with ADLs and functional mobility   Lives With Jonh Help From Family   ADL Assistance Independent   IADLs Needs assistance  ( assisting c cooking and laundry recently)   Falls in the last 6 months 1 to 4  (3 falls reproted)   Vocational On disability   Comments (-) driving;  does transportation   Lifestyle   Autonomy Patient reporting being independent with ADLs, ambulatory with [facility] and lives with  in a one level house   Reciprocal Relationships  present during eval   Service to Others On disability   ADL   Eating Assistance 6  Modified independent   Grooming Assistance 6  Modified Independent   UB Bathing Assistance 5  Supervision/Setup   LB Bathing Assistance 4  [address] 5  Supervision/Setup    [facility] 3  Moderate [address]  4  Minimal Assistance   Additional Comments Pt limited by decreased strength, balance, endurance and increased pain  Bed Mobility   Supine to Sit 4  Minimal assistance   Additional items Assist x 1;HOB elevated; Bedrails; Increased time required;Verbal cues;LE management   Sit to Supine   (DNT: pt seated OOB in recliner at end of eval)   Additional Comments Pt denied lightheaded/dizziness with transitional movements   Transfers   Sit to Stand 4  Minimal assistance   Additional items Assist x 1;Bedrails; Increased time required;Verbal cues   Stand to Sit 4  Minimal assistance   Additional items Assist x 1; Increased time required;Verbal cues;Armrests   Additional Comments Verbal cues provided for proper hand placement and safe body mechanics   Functional Mobility   Functional Mobility 4  Minimal assistance   Additional Comments Pt ambulated short distance from bed to recliner with assist of 2 for safety  Additional items Rolling walker   Balance   Static Sitting Good   Dynamic Sitting Fair +   Static Standing Fair   Dynamic Standing Fair -   Activity Tolerance   Activity Tolerance Patient limited by pain   Medical Staff Made Aware Pt seen as a co-eval with PT due to the patient's co-morbidities, clinically unstable presentation, and present impairments which are a regression from the patient's baseline  Nurse Made Aware CHU Mcgovern made aware of outcomes   RUE Assessment   RUE Assessment WFL  (grossly 4/5 MMT)   LUE Assessment   LUE Assessment WFL  (grossly 4/5 MMT)   Hand Function   Gross Motor Coordination Functional   Fine Motor Coordination Impaired  ("no strength in L hand"- claw hand noted)   Sensation   Light Touch Severe deficits in the RUE;Severe deficits in the LUE;Severe deficits in the RLE; Severe deficits in the LLE  (numbness/tingles in BUE/LE)   Vision-Basic Assessment   Current Vision Wears glasses all the time   Patient Visual Report   (no significant changes reported)   Cognition   Overall Cognitive Status WFL   Arousal/Participation Alert; Responsive; Cooperative   Attention Within functional limits   Orientation Level Oriented X4   Memory Within functional limits   Following Commands Follows one step commands with increased time or repetition   Assessment Limitation Decreased ADL status; Decreased UE strength;Decreased endurance;Decreased sensation;Decreased fine motor control;Decreased self-care trans;Decreased high-level ADLs   Prognosis Good   Assessment Patient is a 65 y o  female seen for OT evaluation s/p admit to [facility] on 5/18/2022 w/Arthritis of left knee  Commorbidities affecting patient's functional performance at time of assessment include: arthritis, CKD, fibromyalgia, IBS, RLS, and spinal stenosis  Orders placed for OT evaluation and treatment and acitivty-beginning POD #0  Performed at least two patient identifiers during session including name and wristband  Prior to admission, Patient reporting being independent with ADLs, ambulatory with [facility] and lives with  in a one level house  Personal factors affecting patient at time of initial evaluation include: steps to enter, difficulty performing ADLs and difficulty performing IADLs  Upon evaluation, patient requires supervision assist for UB ADLs, minimal  and moderate assist for LB ADLs, transfers and functional ambulation in room and bathroom with minimal  assist, with the use of Rolling Walker  Patient is oriented x 4 and presents with ability to recognize a problem, define a problem, identify alternative plan, select a plan, organize steps in a plan, implement plan and evaluate outcome (problem solving)  Occupational performance is affected by the following deficits: decreased muscle strength, impaired fine motor coordination, dynamic sit/ stand balance deficit with poor standing tolerance time for self care and functional mobility, decreased activity tolerance, impaired sensation, increased pain and delayed righting and equilibrium reactions  Based on the mentioned OT evaluation outcomes, functional performance deficits, and assessment findings, pt has been identified as a high complexity evaluation   Patient to benefit from continued Occupational Therapy treatment while in the hospital to address deficits as defined above and maximize level of functional independence with ADLs and functional mobility  Occupational Performance areas to address include: grooming , bathing/ shower, dressing, toilet hygiene, transfer to all surfaces and functional ambulation  From OT standpoint, recommendation at time of d/c would be Home with home health rehabilitation  Goals   Patient Goals to have less pain   Plan   Treatment Interventions ADL retraining;Functional transfer training;UE strengthening/ROM; Endurance training;Patient/family training;Equipment evaluation/education; Compensatory technique education; Fine motor coordination activities; Neuromuscular reeducation; Activityengagement; Energy conservation   Goal Expiration Date 05/28/22   OT Treatment Day 0   OT Frequency 3-5x/wk   Recommendation   OT Discharge Recommendation Home with home health rehabilitation   Equipment Recommended Bedside commode   Commode Type Standard   OT - OK to Discharge Yes  (Once medically cleared)   Additional Comments  At end of eval, pt seated OOB in recliner with all needs met  Additional Comments 2 The patient's raw score on the AM-PAC Daily Activity inpatient short form is 19, standardized score is 40 22, greater than 39 4  Patients at this level are likely to benefit from discharge to home  Please refer to the recommendation of the Occupational Therapist for safe discharge planning     AM-PAC Daily Activity Inpatient   Lower Body Dressing 2   Bathing 3   Toileting 3   Upper Body Dressing 3   Grooming 4   Eating 4   Daily Activity Raw Score 19   Daily Activity Standardized Score (Calc for Raw Score >=11) 40 22   AM-PAC Applied Cognition Inpatient   Following a Speech/Presentation 4   Understanding Ordinary Conversation 4   Taking Medications 3   Remembering Where Things Are Placed or Put Away 3   Remembering List of 4-5 Errands 3   Taking Care of Complicated Tasks 3   Applied Cognition Raw Score 20   Applied Cognition Standardized Score 41 76     GOALS:    *ADL transfers with (I) for inc'd independence with ADLs/purposeful tasks    *UB ADL with (I) for inc'd independence with self cares    *LB ADL with (I) using AE prn for inc'd independence with self cares    *Toileting with (I) for clothing management and hygiene for return to PLOF with personal care    *Increase stand tolerance x 5  m for inc'd tolerance with standing purposeful tasks    *Participate in 10m UE therex to increase overall stamina/activity tolerance for purposeful tasks    *Bed mobility- (I) for inc'd independence to manage own comfort and initiate EOB & OOB purposeful tasks    *Patient will verbalize 3 safety awareness/ principles to prevent falls in the home setting  *Patient will verbalize and demonstrate use of energy conservation/deep breathing techniques and work simplification skills during functional activities with no verbal cues  *Patient will increase OOB/sitting tolerance to 2-4 hours per day to increase participation in self-care and leisure tasks with no s/s of exertion  *Pt will verbalize and demonstrate understanding of post-op movement precautions 100% in tx sessions for increased safety and functional mobility        *Pt will demonstrate use of long handled AE during 100% of tx sessions for increased ADL safety and independence following D/C     ROYCE Cai, OTR/L

## 2022-05-18 NOTE — ANESTHESIA POSTPROCEDURE EVALUATION
Post-Op Assessment Note    CV Status:  Stable  Pain Score: 8    Pain management: adequate     Mental Status:  Awake, sleepy and combative   Hydration Status:  Euvolemic   PONV Controlled:  Controlled   Airway Patency:  Patent      Post Op Vitals Reviewed: Yes      Staff: CRNA         No complications documented      BP   161/80   Temp   97 6   Pulse  88   Resp 16   SpO2 99

## 2022-05-18 NOTE — ANESTHESIA PREPROCEDURE EVALUATION
Medical History    History Comments   Heartburn    Abdominal pain    Diarrhea    Hypertension    Shortness of breath    Anxiety    Depression    Intermittent palpitations Tachycardia   Urinary incontinence    Arthritis    Restless leg syndrome    Spinal stenosis Narrowing   Use of cane as ambulatory aid    History of fall    Wears glasses    Edentulous    Full dentures Upper and Lower  Can't wear right now due to recent surgery for sleep surgery   Back pain    Leg pain, bilateral    Sleep apnea History of- recent surgery to correct   Seasonal allergies    Pneumonia History of walking pneumonia multiple times   Neuropathy Bilateral hands, arms   Numbness tops of legs and buttocks and feet   Fibromyalgia    Dizziness    IBS (irritable bowel syndrome)    History of HPV infection    History of colon polyps    Breathing difficulty " Waking Up with trouble breathing" -Post MRI patient had issues   GERD (gastroesophageal reflux disease)    Fibromyalgia, primary    Migraines    History of COVID-19 Cough,fever,vomiting,diahhrea,HA and sore throat-Not Hospitalized   Chronic kidney disease    COVID-19 long hauler Patient states her PCP stated that she has this-Uses inhaler as needed   Ambulates with cane      Procedure:  KNEE TOTAL ARTHROPLASTY (Left Knee)    Relevant Problems   ANESTHESIA (within normal limits)      CARDIO   (+) HTN (hypertension)      MUSCULOSKELETAL   (+) Back pain   (+) Fibromyalgia   (+) Osteoarthritis of cervical spine with myelopathy      NEURO/PSYCH   (+) Anxiety   (+) Chronic pain syndrome   (+) Depression   (+) Fibromyalgia   (+) History of arthritis   (+) History of gastrointestinal disease   (+) History of hypertension   (+) Muscle weakness of left upper extremity      PULMONARY   (+) Obstructive sleep apnea syndrome   (+) Smoking        Physical Exam    Airway    Mallampati score: III  TM Distance: >3 FB  Neck ROM: limited     Dental   upper dentures and lower dentures,     Cardiovascular  Rate: normal,     Pulmonary  Pulmonary exam normal     Other Findings        Anesthesia Plan  ASA Score- 3     Anesthesia Type- general with ASA Monitors  Additional Monitors:   Airway Plan:     Comment: H/o lumbar fusion and spinal cord stimulator, pt prefers GA  Plan Factors-Exercise tolerance (METS): >4 METS  Chart reviewed  Existing labs reviewed  Patient summary reviewed  Patient is a current smoker  Induction- intravenous  Postoperative Plan- Plan for postoperative opioid use  Informed Consent- Anesthetic plan and risks discussed with patient  I personally reviewed this patient with the CRNA  Discussed and agreed on the Anesthesia Plan with the CRNA  Kalyan Quispe

## 2022-05-18 NOTE — CONSULTS
[address] [facility] 1965, 65 y o  female MRN: [id number]  Unit/Bed#: -01 Encounter: [id number]  Primary Care Provider: Justin Machado MD   Date and time admitted to hospital: 5/18/2022  7:30 AM    Inpatient consult to Internal Medicine  Consult performed by: MAIKEL Beckman  Consult ordered by: Trish Argueta PA-C          * Arthritis of left knee  Assessment & Plan  · Underwent an elective left total knee arthroplasty on 5/18/2022  · Treatment per primary team   · Continue with pain management   · PT/OT evaluation   · Fondaparinux for DVT prophylaxis    Stage 3a chronic kidney disease [facility])  Assessment & Plan  Lab Results   Component Value Date    EGFR 52 04/29/2022    EGFR 59 03/30/2021    EGFR 44 5 09/13/2016    CREATININE 1 16 04/29/2022    CREATININE 1 07 03/30/2021    CREATININE 1 27 09/13/2016   · Baseline creatinine is around 1 1-1 3 mg/dL  · Nephrology input appreciated  · Recommend to hold off on lisinopril postop  Amlodipine started  · Avoid hypotension, nephrotoxins  · Monitor BMP closely     Primary hypertension  Assessment & Plan  · BP elevated suspected to be due to pain   · Lisinopril is being held per renal recommendation   · Continue with amlodipine for now and will adjust medication as needed     Chronic pain syndrome  Assessment & Plan  · Patient status post insertion of a spinal cord stimulator and h/o muscle weakness of left upper extremity   · Follows with neurosurgery outpatient   · Continue supportive care   · Continue with gabapentin 900 mg t i d  And p r n  Percocet        Recommendations for Discharge:  · Per Primary Service    History of Present Illness:  Janina Moctezuma is a 65 y o  female who is originally admitted to the orthopedic surgery service due to chronic osteoarthritis of the left knee underwent an elective left total knee surgery  We are consulted for medical management    The patient with past medical history of hypertension, chronic pain syndrome and chronic kidney disease  The patient was seen examined her room  The patient denies any chest pain, dyspnea, nausea, vomiting, or abdominal pain  Patient complains of left knee pain stating that she did not get a spinal anesthesia and that is why she is feeling the pain  Review of Systems:  Review of Systems   Constitutional: Negative for activity change, appetite change, chills, diaphoresis and fever  HENT: Negative for congestion, ear pain, hearing loss, tinnitus and trouble swallowing  Eyes: Negative for photophobia, pain, discharge, itching and visual disturbance  Respiratory: Negative for cough, shortness of breath, wheezing and stridor  Cardiovascular: Negative for chest pain, palpitations and leg swelling  Gastrointestinal: Negative for abdominal pain, blood in stool, constipation, diarrhea, nausea and vomiting  Endocrine: Negative for cold intolerance, heat intolerance, polydipsia, polyphagia and polyuria  Genitourinary: Negative for difficulty urinating, dysuria, frequency, hematuria and urgency  Musculoskeletal: Positive for gait problem  Negative for back pain, joint swelling and neck stiffness  Skin: Negative for pallor, rash and wound  Allergic/Immunologic: Negative for environmental allergies, food allergies and immunocompromised state  Neurological: Negative for dizziness, tremors, speech difficulty, weakness, light-headedness, numbness and headaches  Hematological: Negative for adenopathy  Does not bruise/bleed easily  Psychiatric/Behavioral: Negative for confusion, hallucinations and sleep disturbance         Past Medical and Surgical History:   Past Medical History:   Diagnosis Date    Abdominal pain     Ambulates with cane     Anxiety     Arthritis     Back pain     Breathing difficulty     " Waking Up with trouble breathing" -Post MRI patient had issues    Chronic kidney disease     COPD (chronic obstructive pulmonary disease) ([facility] [address] )     COVID-19 long hauler     Patient states her PCP stated that she has this-Uses inhaler as needed    Depression     Diarrhea     Dizziness     Edentulous     Fibromyalgia     Fibromyalgia, primary     Full dentures     Upper and Lower  Can't wear right now due to recent surgery for sleep surgery    GERD (gastroesophageal reflux disease)     Heartburn     History of colon polyps     History of COVID-19 02/21/2022    Cough,fever,vomiting,diahhrea,HA and sore throat-Not Hospitalized    History of fall     History of HPV infection     Hypertension     IBS (irritable bowel syndrome)     Intermittent palpitations     Tachycardia    Leg pain, bilateral     Migraines     Neuropathy     Bilateral hands, arms  Numbness tops of legs and buttocks and feet    Pneumonia     History of walking pneumonia multiple times    Restless leg syndrome     Seasonal allergies     Shortness of breath     Sleep apnea     History of- recent surgery to correct    Spinal stenosis     Narrowing    Urinary incontinence     Use of cane as ambulatory aid     Wears glasses        Past Surgical History:   Procedure Laterality Date    BLADDER SURGERY  02/10/2016    botox injections    BLOOD PATCH      post previous stim attempt   [address] [facility]  2011, 1997, 2004    x4 C3-4, C4-5, C5-6, C6-7    CHOLECYSTECTOMY  2000    lap ad    COLONOSCOPY      COLONOSCOPY N/A 5/6/2016    Procedure: COLONOSCOPY;  Surgeon: Dominic Sanabria MD;  [facility];   Service:     GYNECOLOGIC CRYOSURGERY      SC ARTHRODESIS ANT INTERBODY MIN DISCECTOMY,LUMBAR N/A 9/13/2016    Procedure: FUSION LUMBAR INTERBODY ANTERIOR APPROACH  L4-5 L5-S1  WITH POSTERIOR INSTRUMENTATION ;  Surgeon: Cleve Patterson MD;  Location: [facility];  Service: Orthopedics    SC EXCISION [facility] N/A 8/24/2016    Procedure: Loretta Kocher;  Surgeon: Lelia Hope MD;  [facility];  Service: ENT    SC PALATOPHAYNGOPLASTY N/A 8/24/2016    Procedure: UVULOPALATOPHARYNGOPLASTY (UPPP); Surgeon: Jaquan Miller MD;  [facility];  Service: ENT    [place] PERCUT IMPLNT [address] N/A 4/9/2021    Procedure: INSERTION CERVICAL DORSAL COLUMN SPINAL CORD STIMULATOR PERCUTANEOUS PERMANENT TRIAL;  Surgeon: Roz Tolentino MD;  [facility];  Service: Neurosurgery    SKIN BIOPSY  2015    R arm, scalp- all benign    SPINAL CORD STIMULATOR IMPLANT      lumbar  in place   [address]      cervical    SPINAL CORD STIMULATOR REMOVAL N/A 4/16/2021    Procedure: REMOVAL OF SPINAL CORD STIMULATOR, OR REMOVAL OF EXTENSIONS AND CONNECTION TO GENERATOR;  Surgeon: Roz Tolentino MD;  Location: [facility];  Service: Neurosurgery    TONSILLECTOMY      TUBAL LIGATION         Meds/Allergies:  all medications and allergies reviewed    Allergies: Allergies   Allergen Reactions    Strawberry Extract - Food Allergy Anaphylaxis    Contrast [Iodinated Diagnostic Agents] Hives     CT scan dye       Social History:     Marital Status: /Civil Union    Substance Use History:   Social History     Substance and Sexual Activity   Alcohol Use No     Social History     Tobacco Use   Smoking Status Current Every Day Smoker    Packs/day: 1 00    Years: 38 00    Pack years: 38 00   Smokeless Tobacco Never Used   Tobacco Comment    encouraged smoking cessation     Social History     Substance and Sexual Activity   Drug Use Yes    Frequency: 7 0 times per week    Types: Marijuana    Comment: CBD oil, THC for pain  Last used yesterday         Family History:  I have reviewed the patients family history    Physical Exam:   Vitals:   Blood Pressure: (!) 186/109 (05/18/22 1510)  Pulse: 85 (05/18/22 1510)  Temperature: 98 °F (36 7 °C) (05/18/22 1510)  Temp Source: Temporal (05/18/22 0811)  Respirations: 18 (05/18/22 1510)  Height: 5' 2" (157 5 cm) (05/18/22 1579)  Weight - Scale: 81 6 kg (180 lb) (05/18/22 2731)  SpO2: 98 % (05/18/22 1510)    Physical Exam  Vitals and nursing note reviewed  Constitutional:       General: She is not in acute distress  Appearance: Normal appearance  HENT:      Head: Normocephalic and atraumatic  Right Ear: External ear normal       Left Ear: External ear normal       Nose: Nose normal  No rhinorrhea  Mouth/Throat:      Mouth: Mucous membranes are moist       Pharynx: Oropharynx is clear  Eyes:      General:         Right eye: No discharge  Left eye: No discharge  Pupils: Pupils are equal, round, and reactive to light  Cardiovascular:      Rate and Rhythm: Normal rate and regular rhythm  Pulses: Normal pulses  Heart sounds: Normal heart sounds  No murmur heard  Pulmonary:      Effort: Pulmonary effort is normal  No respiratory distress  Breath sounds: Normal breath sounds  Abdominal:      General: Bowel sounds are normal  There is no distension  Palpations: Abdomen is soft  There is no mass  Tenderness: There is no abdominal tenderness  Musculoskeletal:         General: Swelling (left lower extremity ) present  No tenderness  Normal range of motion  Cervical back: Normal range of motion and neck supple  No muscular tenderness  Skin:     General: Skin is warm and dry  Capillary Refill: Capillary refill takes less than 2 seconds  Findings: No erythema or rash  Neurological:      General: No focal deficit present  Mental Status: She is alert and oriented to person, place, and time  Mental status is at baseline  Psychiatric:         Mood and Affect: Mood normal          Behavior: Behavior normal          Thought Content: Thought content normal          Judgment: Judgment normal          Additional Data:   Lab Results: I have personally reviewed pertinent reports                  Invalid input(s): LABALBU          Lab Results   Component Value Date/Time    HGBA1C 5 4 04/29/2022 12:10 PM    HGBA1C 5 5 2021 09:22 AM    HGBA1C 5 6 2018 08:11 AM           Imaging: I have personally reviewed pertinent reports  XR knee left 1 or 2 views    (Results Pending)       EKG, Pathology, and Other Studies Reviewed on Admission:   · [id number] NSR HR 77     ** Please Note: This note has been constructed using a voice recognition system   **

## 2022-05-18 NOTE — DISCHARGE INSTRUCTIONS
TOTAL KNEE/TOTAL HIP REPLACEMENT  POST OPERATIVE INFORMATION    1  You should use a walker or crutches, but you may put as much weight on the leg as is comfortable unless instructed otherwise  2  You may use ice packs as needed for pain and swelling  20 minutes is required to allow the cold to penetrate deep enough  Cover skin with a layer of cloth before applying the ice pack  3  Pump your ankle up and down frequently throughout the day to facilitate circulation, maintain muscle tone, and to aid in reduction of swelling  4  You may shower after 5 days, but remember to keep the dressings dry  5  You should call our office if you experience pain not controlled by your medication, develop a fever, and experience increased drainage or redness around the incision  6  Do not drive a vehicle until you see your physician at the follow-up appointment  7  Refer to your total joint card regarding the need for antibiotics for the following procedures:  Any dental procedures, any infection, especially skin infections, vaginal or GYN exams or surgery, and colonoscopy  8  If you have had a total hip replacement following instructions below to prevent the hip from sliding out a position:   -DO NOT bend your hip more than 90°  This means no squatting, no bending over to tie your shoes, and no bending from the waist to  things from the floor, even if seated  -DO NOT cross your operated leg/foot inward ([place]-toed)   -ALWAYS sleep with pillow or cushion between your legs, as instructed by your doctor   -After surgery these precautions will be again covered by your therapists on a daily basis  9  Call our office for an appointment to see Dr Sadie De Luna in about 14 days  10  You should start outpatient therapy as soon as possible after discharge  11   The patient was instructed to paint incision with betadine two times per day

## 2022-05-18 NOTE — PHYSICAL THERAPY NOTE
Physical Therapy Evaluation     Patient's Name: Radha Hanks    Admitting Diagnosis  Primary osteoarthritis of left knee [M17 12]    Problem List  Patient Active Problem List   Diagnosis    Obstructive sleep apnea syndrome    History of arthritis    History of gastrointestinal disease    History of hypertension    Lumbar spinal stenosis    Chronic pain syndrome    Neck pain    Back pain    Obesity (BMI 30-39  9)    Smoking    Anxiety    Fibromyalgia    Depression    HTN (hypertension)    Osteoarthritis of cervical spine with myelopathy    Status post insertion of spinal cord stimulator    Muscle weakness of left upper extremity    Arthritis of left knee       Past Medical History  Past Medical History:   Diagnosis Date    Abdominal pain     Ambulates with cane     Anxiety     Arthritis     Back pain     Breathing difficulty     " Waking Up with trouble breathing" -Post MRI patient had issues    Chronic kidney disease     COPD (chronic obstructive pulmonary disease) ([facility] [address] )     COVID-19 long hauler     Patient states her PCP stated that she has this-Uses inhaler as needed    Depression     Diarrhea     Dizziness     Edentulous     Fibromyalgia     Fibromyalgia, primary     Full dentures     Upper and Lower  Can't wear right now due to recent surgery for sleep surgery    GERD (gastroesophageal reflux disease)     Heartburn     History of colon polyps     History of COVID-19 02/21/2022    Cough,fever,vomiting,diahhrea,HA and sore throat-Not Hospitalized    History of fall     History of HPV infection     Hypertension     IBS (irritable bowel syndrome)     Intermittent palpitations     Tachycardia    Leg pain, bilateral     Migraines     Neuropathy     Bilateral hands, arms   Numbness tops of legs and buttocks and feet    Pneumonia     History of walking pneumonia multiple times    Restless leg syndrome     Seasonal allergies     Shortness of breath     Sleep apnea History of- recent surgery to correct    Spinal stenosis     Narrowing    Urinary incontinence     Use of cane as ambulatory aid     Wears glasses        Past Surgical History  Past Surgical History:   Procedure Laterality Date    BLADDER SURGERY  02/10/2016    botox injections    BLOOD PATCH      post previous stim attempt   [address] [facility]  2011, 1997, 2004    x4 C3-4, C4-5, C5-6, C6-7    CHOLECYSTECTOMY  2000    lap ad    COLONOSCOPY      COLONOSCOPY N/A 5/6/2016    Procedure: COLONOSCOPY;  Surgeon: Abida Canas MD;  [facility]; Service:     GYNECOLOGIC CRYOSURGERY      FL ARTHRODESIS ANT INTERBODY MIN DISCECTOMY,LUMBAR N/A 9/13/2016    Procedure: FUSION LUMBAR INTERBODY ANTERIOR APPROACH  L4-5 L5-S1  WITH POSTERIOR INSTRUMENTATION ;  Surgeon: Brielle Salinas MD;  [facility];  Service: Orthopedics    FL EXCISION [facility] N/A 8/24/2016    Procedure: Joann Helper;  Surgeon: Mitzi Hernandez MD;  [facility]: [facility];  Service: ENT    FL PALATOPHAYNGOPLASTY N/A 8/24/2016    Procedure: UVULOPALATOPHARYNGOPLASTY (UPPP);   Surgeon: Mitzi Hernandez MD;  [facility];  Service: ENT    FL PERCUT IMPLNT [address] N/A 4/9/2021    Procedure: INSERTION CERVICAL DORSAL COLUMN SPINAL CORD STIMULATOR PERCUTANEOUS PERMANENT TRIAL;  Surgeon: Vale Choudhary MD;  Location:  MAIN OR;  Service: Neurosurgery    SKIN BIOPSY  2015    R arm, scalp- all benign    SPINAL CORD STIMULATOR IMPLANT      lumbar  in place   [facility]      cervical    SPINAL CORD STIMULATOR REMOVAL N/A 4/16/2021    Procedure: REMOVAL OF SPINAL CORD STIMULATOR, OR REMOVAL OF EXTENSIONS AND CONNECTION TO GENERATOR;  Surgeon: Vale Choudhary MD;  Location:  MAIN OR;  Service: Neurosurgery    TONSILLECTOMY      TUBAL LIGATION          05/18/22 1346   PT Last Visit   PT Visit Date 05/18/22   Note Type   Note type Evaluation   Pain Assessment   Pain Assessment Tool 0-10 Pain Score 10 - Worst Possible Pain   Pain Location/Orientation Orientation: Left; Location: Knee   Pain Onset/Description Onset: Ongoing;Frequency: Constant/Continuous; Descriptor: Aching;Descriptor: [place]; Descriptor: Sore   Effect of Pain on Daily Activities Yes: significantly tearful throughout assessment   Patient's Stated Pain Goal No pain   Hospital Pain Intervention(s) Medication (See MAR); Repositioned; Ambulation/increased activity; Emotional support; Environmental changes   Multiple Pain Sites No   Restrictions/Precautions   Weight Bearing Precautions Per Order Yes   LLE Weight Bearing Per Order (S)  FWB   Braces or Orthoses Other (Comment)  (CPM)   Other Precautions Bed Alarm;WBS;Multiple lines;Telemetry;O2;Fall Risk;Pain  (Bridget drain,ellis)   Home Living   Type of [address] [place] [address] One level;Performs ADLs on one level; Able to live on main level with bedroom/bathroom;Stairs to enter without rails  (3 [address]UD[address])   Bathroom Shower/Tub Tub/shower unit   Bathroom Toilet Standard   Bathroom Equipment Grab bars in shower; Shower chair   [address] Cane;Walker; Wheelchair-manual  (prior SPC usage)   Prior Function   Level of Centre Independent with ADLs and functional mobility   Lives With Spouse   Receives Help From Family   ADL Assistance Independent   IADLs Needs assistance  (recent cooking, laundry assistance,transportation)   Falls in the last 6 months 1 to 4  (x 3 total, most recent x 1 week ago)   Vocational On disability  ("Im on that forever")   General   Additional Pertinent History Amy OT present for co-assessment due to medical complexity, required skilled interventions of 2 clinicians to trial WB tasks    (POD 0: L TKA as per Dr Marcelo John)   Family/Caregiver Present Yes  (spouse Silvino)   Cognition   Overall Cognitive Status [facility]   Arousal/Participation Alert   Orientation Level Oriented X4   Memory Within functional limits   Following Commands Follows one step commands with increased time or repetition   Comments Pt  agreeable to PT assessment, cooperative and appreciative  Subjective   Subjective "I will try'   RLE Assessment   RLE Assessment X  (3+/5 gross musculature)   LLE Assessment   LLE Assessment X   LLE Overall AROM   L Knee Flexion decreased   L Knee Extension decreased terminal knee extension   Strength LLE   L Hip Flexion 3-/5   L Hip ABduction 3-/5   L Hip ADduction 3-/5   L Knee Flexion 2+/5   L Knee Extension 2+/5   L Ankle Dorsiflexion 3-/5   Vision-Basic Assessment   Current Vision Wears glasses all the time   Vestibular   Spontaneous Nystagmus (-) no evidence of nystagmus at rest in room light   Coordination   Movements are Fluid and Coordinated 0   Coordination and Movement Description Incremental, antalgic mobility requiring increased time  Sharp/Dull   RLE Sharp/Dull Impaired  ("severe nerve damage B hands L>R, B legs, "numb spots all over my body")   LLE Sharp/Dull Impaired   Bed Mobility   Supine to Sit 4  Minimal assistance   Additional items Assist x 1;HOB elevated; Bedrails; Increased time required;Verbal cues;LE management   Sit to Supine   (DNT pt  was sitting out of bed on recliner upon conclusion )   Additional Comments Pt  denied lightheadedness/dizziness with positional changes  Verbal cues for appropriate bedrail usage, proper body mechanics  Transfers   Sit to Stand 4  Minimal assistance   Additional items Assist x 1;Bedrails; Increased time required;Verbal cues; Other  (2nd staff present prn)   Stand to Sit 4  Minimal assistance   Additional items Assist x 1;Bedrails; Increased time required;Verbal cues   Stand pivot 4  Minimal assistance   Additional items Assist x 2; Increased time required;Verbal cues   Additional Comments Verbal cues for appropriate RW usage, LLE FWB status and encouragement for weight acceptance, proper patterning of RW-LLE-RLE  Ambulation/Elevation   Gait pattern Improper Weight shift; Forward Flexion; Antalgic;Decreased L stance; Short stride; Step to;Excessively slow   Gait Assistance 4  Minimal assist   Additional items Assist x 1;Verbal cues; Tactile cues   Assistive Device Rolling walker   Distance 3 feet  (out of bed to recliner)   Stair Management Assistance Not tested  (at this time, to be trialed at a later session)   Balance   Static Sitting Good   Dynamic Sitting Fair +   Static Standing Fair   Dynamic Standing Fair -   Ambulatory Fair -   Endurance Deficit   Endurance Deficit Yes   Activity Tolerance   Activity Tolerance Patient limited by fatigue;Patient limited by pain   Nurse Made Aware Yes: Nicole Salazar RN aware of assessment outcome  Assessment   Prognosis Good   Problem List Decreased strength;Decreased endurance; Impaired balance;Decreased mobility; Decreased coordination;Decreased skin integrity;Orthopedic restrictions;Pain   Assessment Pt is 65 y o  female seen for PT evaluation s/p admit to [facility] on 5/18/2022 w/ Arthritis of left knee  PT consulted to assess pt's functional mobility and d/c needs  Order placed for PT eval and tx, w/ FWB LLE, Activity-Beginning POD#0, Dangle at bedside order  Comorbidities affecting pt's physical performance at time of assessment include: weakness,arthritis L knee POD 0 L TKA as per Dr Robert Schwarz, anxiety,fibromyalgia, HTN,chronic pain syndrome  PTA, pt was independent w/ all functional mobility w/ SPC usage  Personal factors affecting pt at time of IE include: stairs to enter home, inability to ambulate household distances, inability to navigate community distances, inability to navigate level surfaces w/o external assistance, unable to perform dynamic tasks in community, positive fall history, anxiety, unable to perform physical activity, limited insight into impairments and inability to perform ADLs   Please find objective findings from PT assessment regarding body systems outlined above with impairments and limitations including weakness, decreased ROM, impaired balance, decreased endurance, impaired coordination, gait deviations, pain, decreased activity tolerance, decreased functional mobility tolerance, fall risk, orthopedic restrictions and decreased skin integrity  From PT/mobility standpoint, recommendation at time of d/c would be home with outpatient rehabilitation pending progress in order to facilitate return to PLOF  Barriers to Discharge Inaccessible home environment   Goals   Patient Goals to feel better soon   LTG Expiration Date 05/28/22   Long Term Goal #1 1 )Patient will complete bed mobility supervision of 1 for decrease need for caregiver assistance, decrease burden of care  2 ) Patient will complete transfers supervision of 1 to decrease risk of falls, facilitate upright standing posture  3 ) LLE strength to greater than/equal to 3+/5 gross musculature to increase ability to safely transfer, control descent to chair  4 ) Patient will exhibit increase dynamic standing to Good 3-5 minutes without LOB supervision of 1 to improve activity tolerance  5 ) Patient will exhibit increase dynamic ambulatory balance to Fair  feet w/AD  supervision of 1 to improve ability to mobilize to toilet, chair and decrease risk for additional medical complications  6 ) Patient will ascend/descend 3 steps with step to pattern supervision of 1 to access home environment  PT Treatment Day 0   Plan   Treatment/Interventions Functional transfer training;LE strengthening/ROM; Elevations; Therapeutic exercise; Endurance training;Patient/family training;Equipment eval/education; Bed mobility;Gait training;Spoke to nursing;OT   PT Frequency Twice a day   Recommendation   PT Discharge Recommendation Home with outpatient rehabilitation   Equipment Recommended Nichole Tello  (has own available)   Vasiliy Sotelo walker   Change/add to [place]?  No   Additional Comments Upon conclusion, pt  was resting out of bed on recliner  SCDs active, cold packs placed, chair alarm engaged, and all needs within reach  Additional Comments 2 Pt's raw score on the AM-PAC Basic Mobility inpatient short form is 18, standardized score is 41 05  Patients at this level are likely to benefit from DC to home  However, please refer to therapist recommendation for safe DC planning  AM-PAC Basic Mobility Inpatient   Turning in Bed Without Bedrails 3   Lying on Back to Sitting on Edge of Flat Bed 3   Moving Bed to Chair 3   Standing Up From Chair 3   Walk in Room 3   Climb 3-5 Stairs 3   Basic Mobility Inpatient Raw Score 18   Basic Mobility Standardized Score 41 05   Highest Level Of Mobility   JH-HLM Goal 6: Walk 10 steps or more   JH-HLM Achieved 4: Move to chair/commode     History/Personal Factors/Comorbidities: weakness,arthritis L knee POD 0 L TKA as per Dr Rajendra Steiner, anxiety,fibromyalgia, HTN,chronic pain syndrome      # of body structures/limitations: muscle weakness, activity intolerance,decreased endurance, impaired balance, gait deviations,pain    Clinical presentation: unstable as seen in constant pain severe in nature, progressive symptoms prior to hospitalization, fall risk with positive fall history    Initial Assessment Time: [id number]    Letitia Reid, PT

## 2022-05-18 NOTE — ANESTHESIA PROCEDURE NOTES
Peripheral Block    Patient location during procedure: holding area  Start time: 5/18/2022 9:10 AM  Reason for block: procedure for pain, at surgeon's request and post-op pain management  Staffing  Performed: Anesthesiologist   Anesthesiologist: Carmen Georges MD  Preanesthetic Checklist  Completed: patient identified, IV checked, site marked, risks and benefits discussed, surgical consent, monitors and equipment checked, pre-op evaluation and timeout performed  Peripheral Block  Patient position: supine  Prep: ChloraPrep  Patient monitoring: continuous pulse ox and frequent blood pressure checks  Block type: adductor canal block  Laterality: left  Injection technique: single-shot  Procedures: ultrasound guided, Ultrasound guidance required for the procedure to increase accuracy and safety of medication placement and decrease risk of complications    Ultrasound permanent image saved  Needle  Needle type: Stimuplex   Needle gauge: 22 G  Needle length: 10 cm  Needle localization: anatomical landmarks and ultrasound guidance  Test dose: negative  Assessment  Injection assessment: incremental injection, local visualized surrounding nerve on ultrasound, negative aspiration for CSF, negative aspiration for heme and transient paresthesias  Paresthesia pain: immediately resolved  Heart rate change: no  Slow fractionated injection: yes  Post-procedure:  site cleaned  patient tolerated the procedure well with no immediate complications  Additional Notes  Single needle pass, needle visualized throughout

## 2022-05-18 NOTE — H&P
H&P Exam - Orthopedics   Angella Sage 65 y o  female MRN: [id number]  Unit/Bed#: OR [place] Encounter: [id number]    Assessment/Plan     Assessment:  Primary osteoarthritis of left knee  Plan:  Elective left total knee replacement    History of Present Illness   HPI:  Angella Sage is a 65 y o  female who presented to our office complaining of left knee pain  The patient stated that her pain was continuing to worsen  Her symptoms are starting to affect her quality of life  X-rays were performed which were consistent with advanced arthritic changes  After exhausting conservative treatment, the risks and benefits of surgery discussed with the patient  She decided on an elective left total knee replacement  Review of Systems   Constitutional: Negative for chills, fever and unexpected weight change  HENT: Negative for nosebleeds and sore throat  Eyes: Negative for pain, redness and visual disturbance  Respiratory: Negative for cough, shortness of breath and wheezing  Cardiovascular: Negative for chest pain, palpitations and leg swelling  Gastrointestinal: Negative for abdominal pain, nausea and vomiting  Endocrine: Negative for polydipsia and polyuria  Genitourinary: Negative for dysuria and hematuria  Musculoskeletal: Positive for arthralgias  As noted in HPI   Skin: Negative for rash and wound  Neurological: Negative for dizziness, numbness and headaches  Psychiatric/Behavioral: Negative for decreased concentration and suicidal ideas  The patient is not nervous/anxious          Historical Information   Past Medical History:   Diagnosis Date    Abdominal pain     Ambulates with cane     Anxiety     Arthritis     Back pain     Breathing difficulty     " Waking Up with trouble breathing" -Post MRI patient had issues    Chronic kidney disease     COVID-19 long richy     Patient states her PCP stated that she has this-Uses inhaler as needed    Depression     Diarrhea     Dizziness     Edentulous     Fibromyalgia     Fibromyalgia, primary     Full dentures     Upper and Lower  Can't wear right now due to recent surgery for sleep surgery    GERD (gastroesophageal reflux disease)     Heartburn     History of colon polyps     History of COVID-19 02/21/2022    Cough,fever,vomiting,diahhrea,HA and sore throat-Not Hospitalized    History of fall     History of HPV infection     Hypertension     IBS (irritable bowel syndrome)     Intermittent palpitations     Tachycardia    Leg pain, bilateral     Migraines     Neuropathy     Bilateral hands, arms  Numbness tops of legs and buttocks and feet    Pneumonia     History of walking pneumonia multiple times    Restless leg syndrome     Seasonal allergies     Shortness of breath     Sleep apnea     History of- recent surgery to correct    Spinal stenosis     Narrowing    Urinary incontinence     Use of cane as ambulatory aid     Wears glasses      Past Surgical History:   Procedure Laterality Date    BLADDER SURGERY  02/10/2016    botox injections    BLOOD PATCH      post previous stim attempt   [address] [facility]  2011, 1997, 2004    x4 C3-4, C4-5, C5-6, C6-7    CHOLECYSTECTOMY  2000    lap ad    COLONOSCOPY      COLONOSCOPY N/A 5/6/2016    Procedure: COLONOSCOPY;  Surgeon: Josey Daugherty MD;  Location: [facility]; Service:     GYNECOLOGIC CRYOSURGERY      [place] ARTHRODESIS ANT INTERBODY MIN DISCECTOMY,LUMBAR N/A 9/13/2016    Procedure: FUSION LUMBAR INTERBODY ANTERIOR APPROACH  L4-5 L5-S1  WITH POSTERIOR INSTRUMENTATION ;  Surgeon: Reece Diaz MD;  [facility];  Service: Orthopedics    [place] EXCISION [facility] [facility] N/A 8/24/2016    Procedure: Maty Nieves;  Surgeon: Anna Almonte MD;  [facility];  Service: ENT    [place] PALATOPHAYNGOPLASTY N/A 8/24/2016    Procedure: UVULOPALATOPHARYNGOPLASTY (UPPP);   Surgeon: Anna Almonte MD;  [facility];  Service: ENT    [place] PERCUT IMPLNT NEUROELECT,EPIDURAL N/A 4/9/2021    Procedure: INSERTION CERVICAL DORSAL COLUMN SPINAL CORD STIMULATOR PERCUTANEOUS PERMANENT TRIAL;  Surgeon: Bernice Boswell MD;  Location:  MAIN OR;  Service: Neurosurgery    SKIN BIOPSY  2015    R arm, scalp- all benign    SPINAL CORD STIMULATOR IMPLANT      lumbar  in place   [address]      cervical    SPINAL CORD STIMULATOR REMOVAL N/A 4/16/2021    Procedure: REMOVAL OF SPINAL CORD STIMULATOR, OR REMOVAL OF EXTENSIONS AND CONNECTION TO GENERATOR;  Surgeon: Bernice Boswell MD;  Location:  MAIN OR;  Service: Neurosurgery    TONSILLECTOMY      TUBAL LIGATION       Social History   Social History     Substance and Sexual Activity   Alcohol Use No     Social History     Substance and Sexual Activity   Drug Use Yes    Frequency: 7 0 times per week    Types: Marijuana    Comment: CBD oil, THC for pain     Social History     Tobacco Use   Smoking Status Current Every Day Smoker    Packs/day: 1 00    Years: 38 00    Pack years: 38 00   Smokeless Tobacco Never Used   Tobacco Comment    encouraged smoking cessation     Family History: non-contributory    Meds/Allergies   all medications and allergies reviewed  Allergies   Allergen Reactions    Strawberry Extract - Food Allergy Anaphylaxis    Contrast [Iodinated Diagnostic Agents] Hives     CT scan dye       Objective   Vitals: Height 5' 2" (1 575 m), weight 81 6 kg (180 lb), not currently breastfeeding  ,Body mass index is 32 92 kg/m²  No intake or output data in the 24 hours ending 05/18/22 0730    No intake/output data recorded      Invasive Devices  Report    None                 Physical Exam  Ortho Exam  Left lower extremity is neurovascularly intact  Toes are pink and mobile   Compartments are soft  No warmth, erythema or ecchymosis  ROM of knee is from 5-115 degrees  Negative Lachman, drawer or pivot shift  No medial instability  Medial joint line tenderness, slight lateral joint line tenderness  Patellofemoral crepitation  Lungs CTA  Heart RRR    eLab Results: I have personally reviewed pertinent lab results  Imaging: I have personally reviewed pertinent reports  EKG, Pathology, and Other Studies: I have personally reviewed pertinent reports  Code Status: Prior  Advance Directive and Living Will:      Power of :    POLST:      Counseling / Coordination of Care  Total floor / unit time spent today 30 minutes  Greater than 50% of total time was spent with the patient and / or family counseling and / or coordination of care

## 2022-05-18 NOTE — ANESTHESIA PROCEDURE NOTES
Peripheral Block    Patient location during procedure: holding area  Start time: 5/18/2022 9:15 AM  Reason for block: procedure for pain, at surgeon's request and post-op pain management  Staffing  Performed: Anesthesiologist   Anesthesiologist: Kelly Monk MD  Preanesthetic Checklist  Completed: patient identified, IV checked, site marked, risks and benefits discussed, surgical consent, monitors and equipment checked, pre-op evaluation and timeout performed  Peripheral Block  Patient position: supine  Prep: ChloraPrep  Patient monitoring: continuous pulse ox and frequent blood pressure checks  Block type: ipack block  Laterality: left  Injection technique: single-shot  Procedures: ultrasound guided, Ultrasound guidance required for the procedure to increase accuracy and safety of medication placement and decrease risk of complications    Ultrasound permanent image saved  Needle  Needle type: Stimuplex   Needle gauge: 22 G  Needle length: 10 cm  Needle localization: anatomical landmarks and ultrasound guidance  Test dose: negative  Assessment  Injection assessment: incremental injection, local visualized surrounding nerve on ultrasound, negative aspiration for CSF, negative aspiration for heme and transient paresthesias  Paresthesia pain: immediately resolved  Heart rate change: no  Slow fractionated injection: yes  Post-procedure:  site cleaned  patient tolerated the procedure well with no immediate complications  Additional Notes  Single needle pass, needle visualized throughout

## 2022-05-18 NOTE — CONSULTS
Ernesto Lowe 65 y o  female MRN: [id number]  Unit/Bed#: -01 Encounter: [id number]        Assessment and Plan:    1  Stage 3 chronic kidney disease with baseline creatinine around 1 1-1 3 mg/dL  2  Perioperative optimization of CKD patient high risk for BRYANNA  · Limited NSAIDs as able  Received lisinopril 20 mg postop -will hold p m  dose and start amlodipine in its place  BMP tomorrow  Avoid hypotension, maintain appropriate mean arterial pressures  Avoid wide fluctuation in blood pressure  Indwelling urinary catheter is intact, when removed, monitor for urinary retention  3  Hypertension in the setting of CKD 3  · Hold lisinopril  Received 20 mg today  Start amlodipine 5 mg and hold for SBP less than 130 mmHg  Added IV hydralazine p r n  for blood pressure greater than 160 mmHg  Hypertension likely on the basis of pain  Recommend urine studies as an outpatient  4  POD #0 left total knee replacement by Dr Valerio Landeros    HPI:    Cherise Nguyen is a 65 y o  female with an active problem list significant for CKD 3, hypertension, long-time smoker, osteoarthritis, obesity, chronic pain who underwent elective left total knee arthroplasty today by Dr Valerio Landeros  Nephrology was consulted for perioperative optimization of CKD patient with high risk for BRYANNA  Upon discussion with the patient, she relates HPI as above  She was never told that she had chronic kidney disease before this time  She states she has had longstanding high blood pressure and a long-times smoker  She denies NSAID use  She denies nausea, vomiting, diarrhea, dysuria, urgency, frequency  She does have significant pain to her left knee  From Nephrology perspective, patient has stage IIIa chronic kidney disease baseline creatinine around 1 1-1 2 mg/dL  Most recent creatinine 1 16 mg/dL  Denies NSAID use  Does have longstanding hypertension and smoker  Held her lisinopril for surgery    No contributory family history    Reason for Consult:  Perioperative optimization of patient's high risk for BRYANNA    Review of Systems:  A complete 10-point review of systems was performed  Aside from what was mentioned in the HPI, it is otherwise negative  Historical Information   Past Medical History:   Diagnosis Date    Abdominal pain     Ambulates with cane     Anxiety     Arthritis     Back pain     Breathing difficulty     " Waking Up with trouble breathing" -Post MRI patient had issues    Chronic kidney disease     COPD (chronic obstructive pulmonary disease) ([facility] [address] )     COVID-19 long hauler     Patient states her PCP stated that she has this-Uses inhaler as needed    Depression     Diarrhea     Dizziness     Edentulous     Fibromyalgia     Fibromyalgia, primary     Full dentures     Upper and Lower  Can't wear right now due to recent surgery for sleep surgery    GERD (gastroesophageal reflux disease)     Heartburn     History of colon polyps     History of COVID-19 02/21/2022    Cough,fever,vomiting,diahhrea,HA and sore throat-Not Hospitalized    History of fall     History of HPV infection     Hypertension     IBS (irritable bowel syndrome)     Intermittent palpitations     Tachycardia    Leg pain, bilateral     Migraines     Neuropathy     Bilateral hands, arms   Numbness tops of legs and buttocks and feet    Pneumonia     History of walking pneumonia multiple times    Restless leg syndrome     Seasonal allergies     Shortness of breath     Sleep apnea     History of- recent surgery to correct    Spinal stenosis     Narrowing    Urinary incontinence     Use of cane as ambulatory aid     Wears glasses      Past Surgical History:   Procedure Laterality Date    BLADDER SURGERY  02/10/2016    botox injections    BLOOD PATCH      post previous stim attempt   [facility]  2011, 1997, 2004    x4 C3-4, C4-5, C5-6, C6-7    CHOLECYSTECTOMY  2000    lap ad    COLONOSCOPY      COLONOSCOPY N/A 5/6/2016    Procedure: COLONOSCOPY;  Surgeon: Leyla Faith MD;  Location: [facility]; Service:     GYNECOLOGIC CRYOSURGERY      WA ARTHRODESIS ANT INTERBODY MIN DISCECTOMY,LUMBAR N/A 9/13/2016    Procedure: FUSION LUMBAR INTERBODY ANTERIOR APPROACH  L4-5 L5-S1  WITH POSTERIOR INSTRUMENTATION ;  Surgeon: Israel Soto MD;  Location: [facility];  Service: Orthopedics    WA EXCISION [facility] N/A 8/24/2016    Procedure: Tessa Bundy;  Surgeon: Felicia Lima MD;  Location: [facility];  Service: ENT    WA PALATOPHAYNGOPLASTY N/A 8/24/2016    Procedure: UVULOPALATOPHARYNGOPLASTY (UPPP); Surgeon: Felicia Lima MD;  [facility];  Service: ENT    WA PERCUT IMPLNT [address] N/A 4/9/2021    Procedure: INSERTION CERVICAL DORSAL COLUMN SPINAL CORD STIMULATOR PERCUTANEOUS PERMANENT TRIAL;  Surgeon: Cici Serna MD;  Location:  MAIN OR;  Service: Neurosurgery    SKIN BIOPSY  2015    R arm, scalp- all benign    SPINAL CORD STIMULATOR IMPLANT      lumbar  in place   [facility]      cervical    SPINAL CORD STIMULATOR REMOVAL N/A 4/16/2021    Procedure: REMOVAL OF SPINAL CORD STIMULATOR, OR REMOVAL OF EXTENSIONS AND CONNECTION TO GENERATOR;  Surgeon: Cici Serna MD;  Location:  MAIN OR;  Service: Neurosurgery    TONSILLECTOMY      TUBAL LIGATION       Social History   Social History     Substance and Sexual Activity   Alcohol Use No     Social History     Substance and Sexual Activity   Drug Use Yes    Frequency: 7 0 times per week    Types: Marijuana    Comment: CBD oil, THC for pain  Last used yesterday       Social History     Tobacco Use   Smoking Status Current Every Day Smoker    Packs/day: 1 00    Years: 38 00    Pack years: 38 00   Smokeless Tobacco Never Used   Tobacco Comment    encouraged smoking cessation       Family History:   Family History   Problem Relation Age of Onset    Thyroid cancer Mother     Thyroid cancer Father     Alcohol abuse Sister     No Known Problems Brother     Cancer Family        Medications:  Pertinent medications were reviewed  Current Facility-Administered Medications   Medication Dose Route Frequency Provider Last Rate    acetaminophen  650 mg Oral Q4H PRN Zulema Flow, PA-CAAR      aluminum-magnesium hydroxide-simethicone  30 mL Oral Q6H PRN Zulema Flow, PA-CARA      ascorbic acid  500 mg Oral BID Zulema Flow, PA-CARA      cefazolin  1,000 mg Intravenous Q8H Zulema Flow, [place]      cholecalciferol  5,000 Units Oral Daily [place], [place]      [START ON 5/19/2022] citalopram  40 mg Oral Early Morning Jasmine Granado PA-C      docusate sodium  100 mg Oral BID [facility] Nina Granado PA-C      DULoxetine  60 mg Oral BID Zulema Flow, RUPERTO      ferrous sulfate  325 mg Oral BID With Meals [place], [place]      [START ON 5/19/2022] fluticasone-vilanterol  1 puff Inhalation Daily [place], [place]      folic acid  1 mg Oral Daily [place], [place]      [START ON 5/19/2022] fondaparinux  2 5 mg Subcutaneous Daily [place], [place]      gabapentin  900 mg Oral TID Jasmine Granado PA-C      HYDROmorphone  0 5 mg Intravenous Q3H PRN Zulema Flow, PA-CARA      lactated ringers  1,000 mL Intravenous Once PRN Zulema Flow, RUPERTO      And    lactated ringers  1,000 mL Intravenous Once PRN Jasmine Granado PA-C      lisinopril  20 mg Oral BID Jasminebhanu Granado PA-C      LORazepam           multivitamin-minerals  1 tablet Oral Daily Zulema Flow, PA-CARA      ondansetron  4 mg Intravenous Q6H PRN Zulema Flow, PA-CARA      oxyCODONE-acetaminophen  1 tablet Oral Q4H PRN Zulema Flow, PA-CARA      oxyCODONE-acetaminophen  2 tablet Oral Q6H PRN Jasmine Granado PA-C      [START ON 5/19/2022] pantoprazole  40 mg Oral Early Morning Zulema Flow, RUPERTO      rOPINIRole  2 mg Oral HS Asia Causey PA-C      sodium chloride  1,000 mL Intravenous Once PRN Brain [place] RUPERTO Granado      And    sodium chloride  1,000 mL Intravenous Once PRN [place] Causey PA-C           Allergies   Allergen Reactions    Strawberry Extract - Food Allergy Anaphylaxis    Contrast [Iodinated Diagnostic Agents] Hives     CT scan dye         Vitals:   BP (!) 167/112   Pulse 93   Temp 98 4 °F (36 9 °C)   Resp 18   Ht 5' 2" (1 575 m)   Wt 81 6 kg (180 lb)   SpO2 98%   BMI 32 92 kg/m²   Body mass index is 32 92 kg/m²  SpO2: 98 %,   SpO2 Activity: At Rest,   O2 Device: Nasal cannula      Intake/Output Summary (Last 24 hours) at 5/18/2022 1508  Last data filed at 5/18/2022 1124  Gross per 24 hour   Intake 2050 ml   Output 100 ml   Net 1950 ml     Invasive Devices  Report    Peripheral Intravenous Line  Duration           Peripheral IV 05/18/22 Left;Dorsal (posterior) Hand <1 day    Peripheral IV 05/18/22 Right Forearm <1 day          Drain  Duration           Closed/Suction Drain Left Knee Other (Comment)  <1 day    Urethral Catheter Double-lumen; Latex 16 Fr  <1 day                Physical Exam:  General: conscious, cooperative, in no acute distress  Eyes: conjunctivae pink, anicteric sclerae  ENT: lips and mucous membranes moist  Neck: supple, no JVD, no masses  Chest:  Diminished breath sounds bilateral, no crackles, ronchus or wheezings on nasal  CVS: S1 & S2, normal rate, regular rhythm  Abdomen: soft, non-tender, non-distended, normoactive bowel sounds obese  Extremities:  Left knee in immobilizer which drained some edema noted  Skin: no rash  Neuro: awake, alert, oriented  :  Indwelling urinary catheter intact draining clear yellow urine    Diagnostic Data:  Lab: I have personally reviewed pertinent lab results  ,   CBC:       CMP: No results found for: SODIUM, K, CL, CO2, ANIONGAP, BUN, CREATININE, GLUCOSE, CALCIUM, AST, ALT, ALKPHOS, PROT, BILITOT, EGFR, PT/INR: No results found for: PT, INR,   Magnesium: No components found for: MAG,  Phosphorous: No results found for: PHOS    Microbiology:  @[facility],(urinecx:7)@        MAIKEL Tinoco    Portions of the record may have been created with voice recognition software  Occasional wrong word or "sound a like" substitutions may have occurred due to the inherent limitations of voice recognition software  Read the chart carefully and recognize, using context, where substitutions have occurred

## 2022-05-19 VITALS
OXYGEN SATURATION: 95 % | RESPIRATION RATE: 18 BRPM | HEIGHT: 62 IN | WEIGHT: 180 LBS | HEART RATE: 79 BPM | SYSTOLIC BLOOD PRESSURE: 143 MMHG | TEMPERATURE: 98.2 F | BODY MASS INDEX: 33.13 KG/M2 | DIASTOLIC BLOOD PRESSURE: 107 MMHG

## 2022-05-19 DIAGNOSIS — M17.12 PRIMARY OSTEOARTHRITIS OF LEFT KNEE: Primary | ICD-10-CM

## 2022-05-19 LAB
ANION GAP SERPL CALCULATED.3IONS-SCNC: 6 MMOL/L (ref 4–13)
BUN SERPL-MCNC: 17 MG/DL (ref 5–25)
CALCIUM SERPL-MCNC: 9.5 MG/DL (ref 8.4–10.2)
CHLORIDE SERPL-SCNC: 105 MMOL/L (ref 96–108)
CO2 SERPL-SCNC: 29 MMOL/L (ref 21–32)
CREAT SERPL-MCNC: 0.75 MG/DL (ref 0.6–1.3)
ERYTHROCYTE [DISTWIDTH] IN BLOOD BY AUTOMATED COUNT: 14.1 % (ref 11.6–15.1)
GFR SERPL CREATININE-BSD FRML MDRD: 89 ML/MIN/1.73SQ M
GLUCOSE SERPL-MCNC: 111 MG/DL (ref 65–140)
HCT VFR BLD AUTO: 38.1 % (ref 34.8–46.1)
HGB BLD-MCNC: 12 G/DL (ref 11.5–15.4)
MCH RBC QN AUTO: 28.4 PG (ref 26.8–34.3)
MCHC RBC AUTO-ENTMCNC: 31.5 G/DL (ref 31.4–37.4)
MCV RBC AUTO: 90 FL (ref 82–98)
PLATELET # BLD AUTO: 220 THOUSANDS/UL (ref 149–390)
PMV BLD AUTO: 10.1 FL (ref 8.9–12.7)
POTASSIUM SERPL-SCNC: 3.7 MMOL/L (ref 3.5–5.3)
RBC # BLD AUTO: 4.22 MILLION/UL (ref 3.81–5.12)
SODIUM SERPL-SCNC: 140 MMOL/L (ref 135–147)
WBC # BLD AUTO: 12.27 THOUSAND/UL (ref 4.31–10.16)

## 2022-05-19 PROCEDURE — 85027 COMPLETE CBC AUTOMATED: CPT | Performed by: PHYSICIAN ASSISTANT

## 2022-05-19 PROCEDURE — 97530 THERAPEUTIC ACTIVITIES: CPT

## 2022-05-19 PROCEDURE — 80048 BASIC METABOLIC PNL TOTAL CA: CPT | Performed by: PHYSICIAN ASSISTANT

## 2022-05-19 PROCEDURE — 97535 SELF CARE MNGMENT TRAINING: CPT

## 2022-05-19 PROCEDURE — 99024 POSTOP FOLLOW-UP VISIT: CPT | Performed by: PHYSICIAN ASSISTANT

## 2022-05-19 PROCEDURE — 97110 THERAPEUTIC EXERCISES: CPT

## 2022-05-19 PROCEDURE — 97116 GAIT TRAINING THERAPY: CPT

## 2022-05-19 RX ORDER — CEPHALEXIN 500 MG/1
500 CAPSULE ORAL EVERY 8 HOURS SCHEDULED
Qty: 15 CAPSULE | Refills: 0 | Status: SHIPPED | OUTPATIENT
Start: 2022-05-19 | End: 2022-05-24

## 2022-05-19 RX ORDER — FONDAPARINUX SODIUM 2.5 MG/.5ML
2.5 INJECTION SUBCUTANEOUS EVERY 24 HOURS
Qty: 7 ML | Refills: 0 | Status: SHIPPED | OUTPATIENT
Start: 2022-05-19 | End: 2022-06-02

## 2022-05-19 RX ORDER — OXYCODONE AND ACETAMINOPHEN 10; 325 MG/1; MG/1
1 TABLET ORAL EVERY 4 HOURS PRN
Qty: 28 TABLET | Refills: 0 | Status: SHIPPED | OUTPATIENT
Start: 2022-05-19 | End: 2022-05-29

## 2022-05-19 RX ADMIN — DULOXETINE 60 MG: 60 CAPSULE, DELAYED RELEASE ORAL at 08:59

## 2022-05-19 RX ADMIN — OXYCODONE HYDROCHLORIDE AND ACETAMINOPHEN 500 MG: 500 TABLET ORAL at 08:59

## 2022-05-19 RX ADMIN — CITALOPRAM HYDROBROMIDE 40 MG: 20 TABLET, FILM COATED ORAL at 05:56

## 2022-05-19 RX ADMIN — GABAPENTIN 900 MG: 300 CAPSULE ORAL at 08:59

## 2022-05-19 RX ADMIN — HYDROMORPHONE HYDROCHLORIDE 0.5 MG: 1 INJECTION, SOLUTION INTRAMUSCULAR; INTRAVENOUS; SUBCUTANEOUS at 01:50

## 2022-05-19 RX ADMIN — CEFAZOLIN SODIUM 1000 MG: 1 SOLUTION INTRAVENOUS at 08:57

## 2022-05-19 RX ADMIN — FONDAPARINUX SODIUM 2.5 MG: 2.5 INJECTION, SOLUTION SUBCUTANEOUS at 08:59

## 2022-05-19 RX ADMIN — AMLODIPINE BESYLATE 5 MG: 5 TABLET ORAL at 08:59

## 2022-05-19 RX ADMIN — FERROUS SULFATE TAB 325 MG (65 MG ELEMENTAL FE) 325 MG: 325 (65 FE) TAB at 07:46

## 2022-05-19 RX ADMIN — CEFAZOLIN SODIUM 1000 MG: 1 SOLUTION INTRAVENOUS at 01:48

## 2022-05-19 RX ADMIN — OXYCODONE HYDROCHLORIDE AND ACETAMINOPHEN 2 TABLET: 5; 325 TABLET ORAL at 13:47

## 2022-05-19 RX ADMIN — FOLIC ACID 1 MG: 1 TABLET ORAL at 08:59

## 2022-05-19 RX ADMIN — PANTOPRAZOLE SODIUM 40 MG: 40 TABLET, DELAYED RELEASE ORAL at 05:56

## 2022-05-19 RX ADMIN — DOCUSATE SODIUM 100 MG: 100 CAPSULE, LIQUID FILLED ORAL at 08:59

## 2022-05-19 RX ADMIN — OXYCODONE HYDROCHLORIDE AND ACETAMINOPHEN 2 TABLET: 5; 325 TABLET ORAL at 03:40

## 2022-05-19 RX ADMIN — Medication 1 TABLET: at 08:59

## 2022-05-19 RX ADMIN — HYDROMORPHONE HYDROCHLORIDE 0.5 MG: 1 INJECTION, SOLUTION INTRAMUSCULAR; INTRAVENOUS; SUBCUTANEOUS at 07:46

## 2022-05-19 RX ADMIN — OXYCODONE HYDROCHLORIDE AND ACETAMINOPHEN 2 TABLET: 5; 325 TABLET ORAL at 09:44

## 2022-05-19 RX ADMIN — FLUTICASONE FUROATE AND VILANTEROL TRIFENATATE 1 PUFF: 100; 25 POWDER RESPIRATORY (INHALATION) at 09:44

## 2022-05-19 RX ADMIN — Medication 5000 UNITS: at 08:59

## 2022-05-19 NOTE — CASE MANAGEMENT
Case Management Discharge Planning Note    Patient name Allyson Orourke  Location /-81 MRN [id number]  : 1965 Date 2022       Current Admission Date: 2022  Current Admission Diagnosis:Primary osteoarthritis of left knee   Patient Active Problem List    Diagnosis Date Noted    Primary osteoarthritis of left knee 2022    Stage 3a chronic kidney disease ([address] ) 2022    Muscle weakness of left upper extremity 2022    Osteoarthritis of cervical spine with myelopathy 2021    Status post insertion of spinal cord stimulator 2021    Obesity (BMI 30-39 9) 2021    Smoking 2021    Anxiety 2021    Fibromyalgia 2021    Depression 2021    Primary hypertension 2021    Chronic pain syndrome 2020    Neck pain 2020    Back pain     History of arthritis 09/15/2016    History of gastrointestinal disease 09/15/2016    History of hypertension 09/15/2016    Lumbar spinal stenosis 09/15/2016    Obstructive sleep apnea syndrome 2016      LOS (days): 0  Geometric Mean LOS (GMLOS) (days):   Days to GMLOS:     OBJECTIVE:            Current admission status: Outpatient Surgery   Preferred Pharmacy:   [address] [place] [address]  Phone: [phone] Fax: [phone]    Primary Care Provider: Delmar Tavarez MD    Primary Insurance: MEDICARE  Secondary Insurance: AARP    DISCHARGE DETAILS:    Discharge planning discussed with[de-identified] patient and   Freedom of Choice: Yes  Comments - Freedom of Choice: recommendation outpt rehab   [facility]  CM contacted family/caregiver?: Yes  Were Treatment Team discharge recommendations reviewed with patient/caregiver?: Yes  Did patient/caregiver verbalize understanding of patient care needs?: Yes  Were patient/caregiver advised of the risks associated with not following Treatment Team discharge recommendations?: Yes    Contacts  Patient Contacts: Sonya Tirado " Silvino"  Relationship to Patient[de-identified] Family (spouse)  Contact Method:  In Person  Reason/Outcome: Discharge [address]         Is the patient interested in [place] [address] at discharge?: No    DME Referral Provided  Referral made for DME?: Yes  DME referral completed for the following items[de-identified] Bedside Commode  DME Supplier Name[de-identified] [employer] (commode was delivered to the room from the consignment closet)    Other Referral/Resources/Interventions Provided:  Interventions: Outpatient PT, Outpatient OT, Prescription Price Check  Referral Comments: out tp rehab recommended  pt has an appointment at the [facility],   pt was instucted on how to give the arixtra shots,   [place] Moat will be delivered to the pharmcy tomorrow  pt is aware that the pharmacy will call when the medication is delivered, pt has a $80 60 copay and she stated she is able to afford    Would you like to participate in our [address] service program?  : No - Declined    Treatment Team Recommendation: Home (home with spouse & outpt rehab & outpt follow up-  will transport)  Discharge Destination Plan[de-identified] Home (home with spouse ,outpt  rehab & outpt follow up)            pt and family are in agreement with the d/c and d/c plan                          Family notified[de-identified]  was in the room

## 2022-05-19 NOTE — PLAN OF CARE
Problem: MOBILITY - ADULT  Goal: Maintain or return to baseline ADL function  Description: INTERVENTIONS:  -  Assess patient's ability to carry out ADLs; assess patient's baseline for ADL function and identify physical deficits which impact ability to perform ADLs (bathing, care of mouth/teeth, toileting, grooming, dressing, etc )  - Assess/evaluate cause of self-care deficits   - Assess range of motion  - Assess patient's mobility; develop plan if impaired  - Assess patient's need for assistive devices and provide as appropriate  - Encourage maximum independence but intervene and supervise when necessary  - Involve family in performance of ADLs  - Assess for home care needs following discharge   - Consider OT consult to assist with ADL evaluation and planning for discharge  - Provide patient education as appropriate  Outcome: Progressing    Goal: Maintains/Returns to pre admission functional level  Description: INTERVENTIONS:  - Perform BMAT or MOVE assessment daily    - Set and communicate daily mobility goal to care team and patient/family/caregiver  - Collaborate with rehabilitation services on mobility goals if consulted  - Perform Range of Motion 3 times a day  - Reposition patient every 2 hours    - Dangle patient 2 times a day  - Stand patient 2 times a day  - Ambulate patient 2 times a day  - Out of bed to chair 2 times a day   - Out of bed for meals 2 times a day  - Out of bed for toileting  - Record patient progress and toleration of activity level   Outcome: Progressing     Problem: Potential for Falls  Goal: Patient will remain free of falls  Description: INTERVENTIONS:  - Educate patient/family on patient safety including physical limitations  - Instruct patient to call for assistance with activity   - Consult OT/PT to assist with strengthening/mobility   - Keep Call bell within reach  - Keep bed low and locked with side rails adjusted as appropriate  - Keep care items and personal belongings within reach  - Initiate and maintain comfort rounds  - Make Fall Risk Sign visible to staff  - Offer Toileting every 2 Hours, in advance of need  - Initiate/Maintain bed alarm  - Obtain necessary fall risk management equipment  - Apply yellow socks and bracelet for high fall risk patients  - Consider moving patient to room near nurses station  Outcome: Progressing     Problem: PAIN - ADULT  Goal: Verbalizes/displays adequate comfort level or baseline comfort level  Description: Interventions:  - Encourage patient to monitor pain and request assistance  - Assess pain using appropriate pain scale  - Administer analgesics based on type and severity of pain and evaluate response  - Implement non-pharmacological measures as appropriate and evaluate response  - Consider cultural and social influences on pain and pain management  - Notify physician/advanced practitioner if interventions unsuccessful or patient reports new pain  Outcome: Progressing     Problem: INFECTION - ADULT  Goal: Absence or prevention of progression during hospitalization  Description: INTERVENTIONS:  - Assess and monitor for signs and symptoms of infection  - Monitor lab/diagnostic results  - Monitor all insertion sites, i e  indwelling lines, tubes, and drains  - Monitor endotracheal if appropriate and nasal secretions for changes in amount and color  - [place] appropriate cooling/warming therapies per order  - Administer medications as ordered  - Instruct and encourage patient and family to use good hand hygiene technique  - Identify and instruct in appropriate isolation precautions for identified infection/condition  Outcome: Progressing  Goal: Absence of fever/infection during neutropenic period  Description: INTERVENTIONS:  - Monitor WBC    Outcome: Progressing     Problem: DISCHARGE PLANNING  Goal: Discharge to home or other facility with appropriate resources  Description: INTERVENTIONS:  - Identify barriers to discharge w/patient and caregiver  - Arrange for needed discharge resources and transportation as appropriate  - Identify discharge learning needs (meds, wound care, etc )  - Arrange for interpretive services to assist at discharge as needed  - Refer to Case Management Department for coordinating discharge planning if the patient needs post-hospital services based on physician/advanced practitioner order or complex needs related to functional status, cognitive ability, or social support system  Outcome: Progressing     Problem: Knowledge Deficit  Goal: Patient/family/caregiver demonstrates understanding of disease process, treatment plan, medications, and discharge instructions  Description: Complete learning assessment and assess knowledge base    Interventions:  - Provide teaching at level of understanding  - Provide teaching via preferred learning methods  Outcome: Progressing

## 2022-05-19 NOTE — PROGRESS NOTES
Progress Note - Orthopedics   Demi Ashraf 65 y o  female MRN: [id number]  Unit/Bed#: -01 Encounter: [id number]    Assessment:  POD 1 s/p left total knee replacement    Plan:  Continue physical therapy and occupational therapy weight-bearing as tolerated  Leg drain removed today  Dressing changed today  DC to home today  Follow-up in 2 weeks for staple removal and new x-rays    Weight bearing:  Weight-bearing as tolerated left lower extremity    VTE Pharmacologic Prophylaxis: Fondaparinux (Arixtra)  VTE Mechanical Prophylaxis: sequential compression device    Subjective: The patient states that her pain is well controlled  She would like to go home today  Vitals: Blood pressure (!) 143/107, pulse 79, temperature 98 2 °F (36 8 °C), resp  rate 18, height 5' 2" (1 575 m), weight 81 6 kg (180 lb), SpO2 95 %, not currently breastfeeding  ,Body mass index is 32 92 kg/m²        Intake/Output Summary (Last 24 hours) at 5/19/2022 1211  Last data filed at 5/19/2022 0746  Gross per 24 hour   Intake 540 ml   Output 2190 ml   Net -1650 ml       Invasive Devices  Report    Peripheral Intravenous Line  Duration           Peripheral IV 05/18/22 Right Forearm 1 day          Drain  Duration           Closed/Suction Drain Left Knee Other (Comment)  1 day                Physical Exam:   Left lower extremity is neurovascularly intact  Toes are pink and mobile  Compartments are soft  Incision is clean, dry and intact  Good quad tone  Negative Homans  Good dorsiflexion and plantar flexion of ankle  Brisk cap refill  Sensation intact    Lab, Imaging and other studies:   CBC:   Lab Results   Component Value Date    WBC 12 27 (H) 05/19/2022    HGB 12 0 05/19/2022    HCT 38 1 05/19/2022    MCV 90 05/19/2022     05/19/2022    MCH 28 4 05/19/2022    MCHC 31 5 05/19/2022    RDW 14 1 05/19/2022    MPV 10 1 05/19/2022     CMP:   Lab Results   Component Value Date    SODIUM 140 05/19/2022     05/19/2022    CO2 29 05/19/2022 BUN 17 05/19/2022    CREATININE 0 75 05/19/2022    CALCIUM 9 5 05/19/2022    EGFR 89 05/19/2022

## 2022-05-19 NOTE — DISCHARGE SUMMARY
Discharge Summary - Kimball Klinefelter 65 y o  female MRN: [id number]    Unit/Bed#: -01 Encounter: [id number]    Admission Date:     Admitting Diagnosis: Primary osteoarthritis of left knee [M17 12]    Procedures Performed:  Elective left total knee replacement    Summary of Hospital Course:     72-year-old female presented to the hospital for an elective left total knee replacement  The patient tolerated the procedure quite well  On postop day 1, her leg drain was removed  She began working with Physical therapy and Occupational therapy weight-bearing as tolerated  Her dressing was changed and her incision was clean, dry and intact  She continued to progress with therapy and was then deemed suitable for discharge to home  The patient was discharged with pain medication, vitamins, DVT prophylaxis; and also an elevated commode and walker if necessary  The patient was instructed to paint incision with betadine two times per day  Discharge Diagnosis:  Primary osteoarthritis of left knee, status post elective left total knee replacement    Condition at Discharge: stable         Discharge instructions/Information to patient and family:   See after visit summary for information provided to patient and family  Provisions for Follow-Up Care:  See after visit summary for information related to follow-up care and any pertinent home health orders  PCP: Giovanny Allan MD    Disposition: Home    Planned Readmission: No      Discharge Statement   I spent 30 minutes discharging the patient  This time was spent on the day of discharge  I had direct contact with the patient on the day of discharge  Additional documentation is required if more than 30 minutes were spent on discharge  Discharge Medications:  See after visit summary for reconciled discharge medications provided to patient and family

## 2022-05-19 NOTE — QUICK NOTE
Nephrology quick note:  Tentative discharge today  Kidney function stable and better than typical (SCr 0 75 mg/dL)  Okay to resume home dose lisinopril  Will sign off at this time and can follow with us prn  Thanks for the consult  -SHARIFA Phillip

## 2022-05-19 NOTE — PLAN OF CARE
Problem: OCCUPATIONAL THERAPY ADULT  Goal: Performs self-care activities at highest level of function for planned discharge setting  See evaluation for individualized goals  Description: Treatment Interventions: ADL retraining, Functional transfer training, UE strengthening/ROM, Endurance training, Patient/family training, Equipment evaluation/education, Compensatory technique education, Fine motor coordination activities, Neuromuscular reeducation, Activityengagement, Energy conservation  Equipment Recommended: Bedside commode       See flowsheet documentation for full assessment, interventions and recommendations  Outcome: Progressing  Note: Limitation: Decreased ADL status, Decreased UE strength, Decreased endurance, Decreased sensation, Decreased fine motor control, Decreased self-care trans, Decreased high-level ADLs  Prognosis: Good  Assessment: Patient participated in Skilled OT session this date with interventions consisting of ADL re training with the use of correct body mechnaics, safety awareness and fall prevention techniques, therapeutic exercise to: increase functional use of BUEs, increase BUE muscle strength  and increase OOB/ sitting tolerance   Patient agreeable to OT treatment session, upon arrival patient was found seated OOB to Recliner  Patient requiring frequent rest periods and ocassional safety reminders and occasional cues for correct techniques and self-care assistance as noted in flow sheet/AM-PAC  Patient continues to be functioning below baseline level, occupational performance remains limited secondary to factors listed above and increased risk for falls and injury  From OT standpoint, recommendation at time of d/c would be home with home health rehabilitation  Patient to benefit from continued Occupational Therapy treatment while in the hospital to address deficits as defined above and maximize level of functional independence with ADLs and functional mobility       OT Discharge Recommendation: Home with home health rehabilitation  OT - OK to Discharge: Yes (Once medically cleared)     KWESI Presley/VIK

## 2022-05-19 NOTE — OCCUPATIONAL THERAPY NOTE
05/19/22 1020   OT Last Visit   OT Visit Date 05/19/22   Note Type   Note Type Treatment  (completed [id number])   Restrictions/Precautions   Weight Bearing Precautions Per Order Yes   LLE Weight Bearing Per Order FWB   Other Precautions Fall Risk;Pain; Chair Alarm;WBS;Multiple lines   General   Response to Previous Treatment Patient with no complaints from previous session   Family/Caregiver Present    Lifestyle   Intrinsic Gratification enjoys doing light housework as able, and taking rides in the car   Pain Assessment   Pain Assessment Tool 0-10   Pain Score 8   Pain Location/Orientation Orientation: Left; Location: Back; Location: Knee; Location: Neck   Hospital Pain Intervention(s) Medication (See MAR); Elevated; Emotional support   ADL   Where Assessed   (patient had already participated in bathing this morning)   LB Dressing Comments Demos  ability to doff/don R sock; able to reach to L foot and doff/don sock with effort  (* will assist at home PRN)   Transfers   Additional Comments Please see PT notes   Therapeutic Excerise-Strength   UE Strength Yes   Right Upper Extremity- Strength   R Shoulder Flexion; Horizontal ABduction; Other (Comment)  (pro/re-traction)   R Elbow Elbow flexion;Elbow extension  (in forward and reverse;  also: supin/pron-ation)   R Weight/Reps/Sets 1 pound weight - 10 to 15 reps each   Left Upper Extremity-Strength   L Weights/Reps/Sets all exers  same as RUE above   Coordination   Gross Motor WFL   Dexterity Impairment of L (pt  states "carpal tunnel")   Cognition   Overall Cognitive Status [facility]   Arousal/Participation Alert; Cooperative   Attention Within functional limits   Orientation Level Oriented X4   Memory Within functional limits   Following Commands Follows all commands and directions without difficulty   Comments Pt  has several long-standing medical conditions - she freely speaks of same   Activity Tolerance   Activity Tolerance Patient limited by fatigue;Patient limited by pain   Assessment   Assessment Patient participated in Skilled OT session this date with interventions consisting of ADL re training with the use of correct body mechnaics, safety awareness and fall prevention techniques, therapeutic exercise to: increase functional use of BUEs, increase BUE muscle strength  and increase OOB/ sitting tolerance   Patient agreeable to OT treatment session, upon arrival patient was found seated OOB to Recliner  Patient requiring frequent rest periods and ocassional safety reminders and occasional cues for correct techniques and self-care assistance as noted in flow sheet/AM-PAC  Patient continues to be functioning below baseline level, occupational performance remains limited secondary to factors listed above and increased risk for falls and injury  From OT standpoint, recommendation at time of d/c would be home with home health rehabilitation  Patient to benefit from continued Occupational Therapy treatment while in the hospital to address deficits as defined above and maximize level of functional independence with ADLs and functional mobility  Plan   Treatment Interventions ADL retraining;UE strengthening/ROM; Patient/family training; Activityengagement; Compensatory technique education   Goal Expiration Date 05/28/22   OT Treatment Day 1   Recommendation   OT Discharge Recommendation Home with home health rehabilitation   Additional Comments 2 The patient's raw score on the AM-PAC Daily Activity inpatient short form is 20, standardized score is 42 03, greater than 39 4  Patients at this level are likely to benefit from discharge to home  Please refer to the recommendation of the Occupational Therapist for safe discharge planning     AM-PAC Daily Activity Inpatient   Lower Body Dressing 3   Bathing 3   Toileting 3   Upper Body Dressing 3   Grooming 4   Eating 4   Daily Activity Raw Score 20   Daily Activity Standardized Score (Calc for Raw Score >=11) 42 03   AM-PAC Applied Cognition Inpatient   Following a Speech/Presentation 4   Understanding Ordinary Conversation 4   Taking Medications 3   Remembering Where Things Are Placed or Put Away 3   Remembering List of 4-5 Errands 3   Taking Care of Complicated Tasks 3   Applied Cognition Raw Score 20   Applied Cognition Standardized Score 41 76   KWESI Weinberg/VIK    *pt  Educated in [address]  [place] - she has (at least) the [address] at home  Did not like a previous trial of the sock-aid so  will provide any needed assistance with dressing (and bathing)     KWESI Hall/VIK

## 2022-05-19 NOTE — DISCHARGE INSTR - APPOINTMENTS
Please call Dr Leger Speaker office within 2 days of you being home to set-up a new patient and transition of care appt, you typically need to be seen within 14 days after discharge  [address]    [phone]

## 2022-05-19 NOTE — CASE MANAGEMENT
Case Management Assessment    Patient name Ratna Franco  Location [address] [id number] MRN [id number]  : 1965 Date 2022       Current Admission Date: 2022  Current Admission Diagnosis:Arthritis of left knee   Patient Active Problem List    Diagnosis Date Noted    Arthritis of left knee 2022    Stage 3a chronic kidney disease ([address] ) 2022    Muscle weakness of left upper extremity 2022    Osteoarthritis of cervical spine with myelopathy 2021    Status post insertion of spinal cord stimulator 2021    Obesity (BMI 30-39 9) 2021    Smoking 2021    Anxiety 2021    Fibromyalgia 2021    Depression 2021    Primary hypertension 2021    Chronic pain syndrome 2020    Neck pain 2020    Back pain     History of arthritis 09/15/2016    History of gastrointestinal disease 09/15/2016    History of hypertension 09/15/2016    Lumbar spinal stenosis 09/15/2016    Obstructive sleep apnea syndrome 2016      LOS (days): 0  Geometric Mean LOS (GMLOS) (days):   Days to GMLOS:     OBJECTIVE:              Current admission status: Outpatient Surgery  Referral Reason: Other (d/c planning)    Preferred Pharmacy:   [address] [place] [address]  Phone: [phone] Fax: [phone]    Primary Care Provider: Tristen Johnson MD    Primary Insurance: MEDICARE  Secondary Insurance: [facility]    ASSESSMENT:  [address] [place] [address] "[facility]" Health Care Representative - Spouse   Primary Phone: [phone] (Mobile)               Advance Directives  Does patient have a [address] [place] [address] Avenue?: No  Was patient offered paperwork?: Yes (pt declined paperwork)  Does patient have Advance Directives?: No  Was patient offered paperwork?: Yes (pt declined paperwork)         Readmission Root Cause  30 Day Readmission: No    Patient Information  Admitted from[de-identified] Home  Mental Status: Alert  During Assessment patient was accompanied by: Not accompanied during assessment  Assessment information provided by[de-identified] Patient  Primary Caregiver: Spouse  Caregiver's Name[de-identified] Kip Valdes"  Caregiver's Relationship to Patient[de-identified] Significant Other  Caregiver's Telephone Number[de-identified] [phone]  Support Systems: Spouse/significant other  [place] of Residence: [address] [place] [address] do you live in?: [address] entry access options   Select all that apply : Stairs  Number of steps to enter home : 3  Do the steps have railings?: No  Type of Current Residence: [place] (1 story and a basement  she does not need to use the basement)  In the last 12 months, was there a time when you were not able to pay the mortgage or rent on time?: No  In the last 12 months, how many places have you lived?: 1  In the last 12 months, was there a time when you did not have a steady place to sleep or slept in a shelter (including now)?: No  Homeless/housing insecurity resource given?: N/A  Living Arrangements: Lives w/ Spouse/significant other  Is patient a ?: No    Activities of Daily Living Prior to Admission  Functional Status: Assistance (cooking, cleaning, shopping, driving, laundry)  Completes ADLs independently?: No  Level of ADL dependence: Assistance (bathing and dressing)  Ambulates independently?: Yes  Does patient use assisted devices?: No  Does patient currently own DME?: Yes  What DME does the patient currently own?: Marlou Blake, Rollator, Shower Chair, Straight Cane (walking stick)  Does patient have a history of Outpatient Therapy (PT/OT)?: Yes  Does the patient have a history of Short-Term Rehab?: Yes ([facility])  Does patient have a history of HHC?: Yes  Does patient currently have [facility]?: No         Patient Information Continued  Income Source: SSI/SSD (disabled)  Does patient have prescription coverage?: Yes ([facility])  Within the past 12 months, you worried that your food would run out before you got the money to buy more : Never true  Within the past 12 months, the food you bought just didn't last and you didn't have money to get more : Never true  Food insecurity resource given?: N/A  Does patient receive dialysis treatments?: No  Does patient have a history of substance abuse?: No  Does patient have a history of Mental Health Diagnosis?: Yes (anxiety)  Is patient receiving treatment for mental health?: Yes (medications from pcp)  Has patient received inpatient treatment related to mental health in the last 2 years?: No         Means of Transportation  Means of Transport to Appts[de-identified] Family transport  In the past 12 months, has lack of transportation kept you from medical appointments or from getting medications?: No  In the past 12 months, has lack of transportation kept you from meetings, work, or from getting things needed for daily living?: No  Was application for public transport provided?: N/A

## 2022-05-19 NOTE — PLAN OF CARE
Problem: PHYSICAL THERAPY ADULT  Goal: Performs mobility at highest level of function for planned discharge setting  See evaluation for individualized goals  Description: Treatment/Interventions: Functional transfer training, LE strengthening/ROM, Elevations, Therapeutic exercise, Endurance training, Patient/family training, Equipment eval/education, Bed mobility, Gait training, Spoke to nursing, OT  Equipment Recommended: Tarun Osorio (has own available)       See flowsheet documentation for full assessment, interventions and recommendations  Outcome: Progressing  Note: Prognosis: Good  Problem List: Decreased strength, Decreased endurance, Impaired balance, Decreased mobility, Decreased coordination, Decreased skin integrity, Orthopedic restrictions, Pain  Assessment: Pt seen for PT treatment session this date with interventions consisting of gait training to reduce the risk of medical complications w/ emphasis on improving pt's ability to ambulate level surfaces x 35 feet with CGA provided by therapist with RW, Therapeutic exercise to increase BLE stability w/WB tasks consisting of: AROM 20 reps B LE in sitting position and therapeutic activity to reduce fall risk consisting of training: supine<>sit transfers, sit<>stand transfers, static sitting tolerance at EOB for 10 minutes w/ unilateral UE support, static standing tolerance for 2 minutes w/ B UE support, vc and tactile cues for static sitting posture faciliation, vc and tactile cues for static standing posture faciliation, stand pivot transfers towards B direction and toileting on commode  Pt agreeable to PT treatment session upon arrival, pt found supine in bed w/ HOB elevated, A&O x 4  In comparison to previous session, pt with improvements in ambulatory distance, balance  Post session: pt returned back to recliner, chair alarm engaged, all needs in reach and RN notified of session findings/recommendations   Continue to recommend home with outpatient rehabilitation at time of d/c in order to maximize pt's functional independence and safety w/ mobility  Pt continues to be functioning below baseline level, and remains limited 2* factors listed above and including weakness, pain,impaired balance,gait deviations,decreased endurance  PT will continue to see pt during current hospitalization in order to address the deficits listed above and provide interventions consistent w/ POC in effort to achieve LTGs  Barriers to Discharge: Inaccessible home environment        PT Discharge Recommendation: Home with outpatient rehabilitation (maintained recommendation)          See flowsheet documentation for full assessment

## 2022-05-19 NOTE — PHYSICAL THERAPY NOTE
Physical Therapy Treatment Session Note    Patient's Name: Juan Carey    Admitting Diagnosis  Primary osteoarthritis of left knee [M17 12]    Problem List  Patient Active Problem List   Diagnosis    Obstructive sleep apnea syndrome    History of arthritis    History of gastrointestinal disease    History of hypertension    Lumbar spinal stenosis    Chronic pain syndrome    Neck pain    Back pain    Obesity (BMI 30-39  9)    Smoking    Anxiety    Fibromyalgia    Depression    Primary hypertension    Osteoarthritis of cervical spine with myelopathy    Status post insertion of spinal cord stimulator    Muscle weakness of left upper extremity    Arthritis of left knee    Stage 3a chronic kidney disease (HCC)       Past Medical History  Past Medical History:   Diagnosis Date    Abdominal pain     Ambulates with cane     Anxiety     Arthritis     Back pain     Breathing difficulty     " Waking Up with trouble breathing" -Post MRI patient had issues    Chronic kidney disease     COPD (chronic obstructive pulmonary disease) ([facility] [address] )     COVID-19 long hauler     Patient states her PCP stated that she has this-Uses inhaler as needed    Depression     Diarrhea     Dizziness     Edentulous     Fibromyalgia     Fibromyalgia, primary     Full dentures     Upper and Lower  Can't wear right now due to recent surgery for sleep surgery    GERD (gastroesophageal reflux disease)     Heartburn     History of colon polyps     History of COVID-19 02/21/2022    Cough,fever,vomiting,diahhrea,HA and sore throat-Not Hospitalized    History of fall     History of HPV infection     Hypertension     IBS (irritable bowel syndrome)     Intermittent palpitations     Tachycardia    Leg pain, bilateral     Migraines     Neuropathy     Bilateral hands, arms   Numbness tops of legs and buttocks and feet    Pneumonia     History of walking pneumonia multiple times    Restless leg syndrome     Seasonal allergies     Shortness of breath     Sleep apnea History of- recent surgery to correct    Spinal stenosis     Narrowing    Urinary incontinence     Use of cane as ambulatory aid     Wears glasses        Past Surgical History  Past Surgical History:   Procedure Laterality Date    BLADDER SURGERY  02/10/2016    botox injections    BLOOD PATCH      post previous stim attempt    CERVICAL FUSION  2011, 1997, 2004    x4 C3-4, C4-5, C5-6, C6-7    CHOLECYSTECTOMY  2000    lap ad    COLONOSCOPY      COLONOSCOPY N/A 5/6/2016    Procedure: COLONOSCOPY;  Surgeon: Elsa Cortes MD;  [facility]; Service:     GYNECOLOGIC CRYOSURGERY      OH ARTHRODESIS ANT INTERBODY MIN DISCECTOMY,LUMBAR N/A 9/13/2016    Procedure: FUSION LUMBAR INTERBODY ANTERIOR APPROACH  L4-5 L5-S1  WITH POSTERIOR INSTRUMENTATION ;  Surgeon: Gaurang Betancourt MD;  [facility]: [facility];  Service: Orthopedics    OH EXCISION [facility] N/A 8/24/2016    Procedure: Stephon Clamp;  Surgeon: Aroldo Tavera MD;  [facility];  Service: ENT    OH PALATOPHAYNGOPLASTY N/A 8/24/2016    Procedure: UVULOPALATOPHARYNGOPLASTY (UPPP);   Surgeon: Aroldo Tavera MD;  [facility];  Service: ENT    OH PERCUT IMPLNT [place] N/A 4/9/2021    Procedure: INSERTION CERVICAL DORSAL COLUMN SPINAL CORD STIMULATOR PERCUTANEOUS PERMANENT TRIAL;  Surgeon: Mami Hull MD;  Location:  MAIN OR;  Service: Neurosurgery    SKIN BIOPSY  2015    R arm, scalp- all benign    SPINAL CORD STIMULATOR IMPLANT      lumbar  in place    SPINAL CORD STIMULATOR REMOVAL      cervical    SPINAL CORD STIMULATOR REMOVAL N/A 4/16/2021    Procedure: REMOVAL OF SPINAL CORD STIMULATOR, OR REMOVAL OF EXTENSIONS AND CONNECTION TO GENERATOR;  Surgeon: Mami Hull MD;  Location:  MAIN OR;  Service: Neurosurgery    TONSILLECTOMY      TUBAL LIGATION          05/19/22 0913   PT Last Visit   PT Visit Date 05/19/22   Note Type   Note Type Treatment   Pain Assessment   Pain Assessment Tool 0-10   Pain Score 8   Pain Location/Orientation Orientation: Left; Location: Knee   Pain Onset/Description Onset: Ongoing;Frequency: Constant/Continuous; Descriptor: Aching;Descriptor: Sore   Effect of Pain on Daily Activities yes   Patient's Stated Pain Goal No pain   Hospital Pain Intervention(s) Medication (See MAR); Repositioned; Ambulation/increased activity; Emotional support; Environmental changes; Elevated;Cold applied  (cold packs provided upon conclusion)   Multiple Pain Sites No   Restrictions/Precautions   Weight Bearing Precautions Per Order Yes   LLE Weight Bearing Per Order (S)  FWB   Braces or Orthoses Other (Comment)  (CPM)   Other Precautions Chair Alarm; Bed Alarm;WBS;Multiple lines; Fall Risk;Pain  (Agar drain,surgical incision site L knee)   General   Chart Reviewed Yes   Additional Pertinent History POD 1: L TKA as per Dr Shmear Rangel   Response to Previous Treatment Patient with no complaints from previous session  Family/Caregiver Present Yes  (spouse Silvino)   Cognition   Overall Cognitive Status WFL   Arousal/Participation Alert; Responsive; Cooperative   Attention Within functional limits   Orientation Level Oriented X4   Memory Within functional limits   Following Commands Follows all commands and directions without difficulty   Comments Pt  agreeable to PT tx session,pleasant  Subjective   Subjective "yea let's go"   Bed Mobility   Supine to Sit   (CGA)   Additional items Assist x 1;HOB elevated; Bedrails; Increased time required;Verbal cues   Sit to Supine   (DNT pt  was sitting out of bed on recliner upon conclusion )   Additional Comments Verbal cues for appropriate bedrail usage, proper body mechanics  Transfers   Sit to Stand   (CGA)   Additional items Assist x 1;Bedrails; Increased time required;Verbal cues   Stand to Sit   (CGA)   Additional items Assist x 1;Bedrails; Increased time required;Verbal cues   Stand pivot   (CGA)   Additional items Assist x 1; Increased time required;Verbal cues   Toilet transfer   (CGA) Additional items Assist x 1; Armrests; Increased time required;Verbal cues; Commode   Additional Comments Verbal cues for proper body mechanics, safety awareness while changing direction  Ambulation/Elevation   Gait pattern Improper Weight shift; Forward Flexion; Antalgic;Decreased L stance; Short stride; Step to   Gait Assistance   (CGA)   Additional items Assist x 1;Verbal cues; Tactile cues   Assistive Device Rolling walker   Distance 35 feet  (to commode, to recliner)   Stair Management Assistance Not tested   Balance   Static Sitting Good   Dynamic Sitting Fair +   Static Standing Fair +   Dynamic Standing Fair   Ambulatory Fair   Endurance Deficit   Endurance Deficit Yes   Activity Tolerance   Activity Tolerance Patient limited by pain   Nurse Made Aware yes, Thanai SAGE & Ramya PCA   Exercises   Quad Sets Sitting;20 reps;AROM; Bilateral   Glute Sets Sitting;20 reps;AROM; Bilateral   Hip Abduction Sitting;20 reps;AROM; Bilateral   Hip Adduction Sitting;20 reps;AROM; Bilateral   Ankle Pumps Sitting;20 reps;AROM; Bilateral   Assessment   Prognosis Good   Problem List Decreased strength;Decreased endurance; Impaired balance;Decreased mobility; Decreased coordination;Decreased skin integrity;Orthopedic restrictions;Pain   Assessment Pt seen for PT treatment session this date with interventions consisting of gait training to reduce the risk of medical complications w/ emphasis on improving pt's ability to ambulate level surfaces x 35 feet with CGA provided by therapist with RW, Therapeutic exercise to increase BLE stability w/WB tasks consisting of: AROM 20 reps B LE in sitting position and therapeutic activity to reduce fall risk consisting of training: supine<>sit transfers, sit<>stand transfers, static sitting tolerance at EOB for 10 minutes w/ unilateral UE support, static standing tolerance for 2 minutes w/ B UE support, vc and tactile cues for static sitting posture faciliation, vc and tactile cues for static standing posture faciliation, stand pivot transfers towards B direction and toileting on commode  Pt agreeable to PT treatment session upon arrival, pt found supine in bed w/ HOB elevated, A&O x 4  In comparison to previous session, pt with improvements in ambulatory distance, balance  Post session: pt returned back to recliner, chair alarm engaged, all needs in reach and RN notified of session findings/recommendations  Continue to recommend home with outpatient rehabilitation at time of d/c in order to maximize pt's functional independence and safety w/ mobility  Pt continues to be functioning below baseline level, and remains limited 2* factors listed above and including weakness, pain,impaired balance,gait deviations,decreased endurance  PT will continue to see pt during current hospitalization in order to address the deficits listed above and provide interventions consistent w/ POC in effort to achieve LTGs  Goals   Patient Goals to get home today or tomorrow   LTG Expiration Date 05/28/22   Long Term Goal #1 LTGs remain appropriate   PT Treatment Day 1   Plan   Treatment/Interventions Functional transfer training;LE strengthening/ROM; Elevations; Therapeutic exercise; Endurance training;Patient/family training;Equipment eval/education; Bed mobility;Gait training;Spoke to nursing   Progress Improving as expected   PT Frequency Twice a day   Recommendation   PT Discharge Recommendation Home with outpatient rehabilitation  (maintained recommendation)   Equipment Recommended [facility] Recommended Wheeled walker   Change/add to [employer]? No   Additional Comments Upon conclusion, pt  was resting out of bed on recliner  SCDs active, cold packs placed, chair alarm engaged, and all needs within reach  Additional Comments 2 Pt's raw score on the AM-PAC Basic Mobility inpatient short form is 19, standardized score is 42 48  Patients at this level are likely to benefit from DC to home   However, please refer to therapist recommendation for safe DC planning     AM-PAC Basic Mobility Inpatient   Turning in Bed Without Bedrails 4   Lying on Back to Sitting on Edge of Flat Bed 3   Moving Bed to Chair 3   Standing Up From Chair 3   Walk in Room 3   Climb 3-5 Stairs 3   Basic Mobility Inpatient Raw Score 19   Basic Mobility Standardized Score 42 48   Highest Level Of Mobility   JH-HLM Goal 6: Walk 10 steps or more   JH-HLM Achieved 7: Walk 25 feet or more     Treatment Time: 6126-9711    Dulce Maria Ruiz, PT

## 2022-05-19 NOTE — PLAN OF CARE
Problem: PHYSICAL THERAPY ADULT  Goal: Performs mobility at highest level of function for planned discharge setting  See evaluation for individualized goals  Description: Treatment/Interventions: Functional transfer training, LE strengthening/ROM, Elevations, Therapeutic exercise, Endurance training, Patient/family training, Equipment eval/education, Bed mobility, Gait training, Spoke to nursing, OT  Equipment Recommended: Quiana Bolaños (has own available)       See flowsheet documentation for full assessment, interventions and recommendations  5/19/2022 1235 by Berna Melendez PT  Outcome: Progressing  Note: Prognosis: Good  Problem List: Decreased strength, Decreased endurance, Impaired balance, Decreased mobility, Decreased skin integrity, Orthopedic restrictions, Pain  Assessment: Pt seen for PT treatment session this date with interventions consisting of gait training to reduce the risk of medical complications w/ emphasis on improving pt's ability to ambulate level surfaces x 50 feet with close S provided by therapist with RW, Therapeutic exercise to increase LLE stability w/WB tasks consisting of: AROM 25 reps B LE in sitting position and therapeutic activity to reduce fall risk consisting of training: sit<>stand transfers, vc and tactile cues for static sitting posture faciliation, vc and tactile cues for static standing posture faciliation, stand pivot transfers towards B direction and toileting on standard toilet  Pt agreeable to PT treatment session upon arrival, pt found seated OOB in recliner, A&O x 4  In comparison to previous session, pt with improvements in ambulatory distance  Post session: pt returned back to recliner, chair alarm engaged, all needs in reach and RN notified of session findings/recommendations  Continue to recommend home with outpatient rehabilitation at time of d/c in order to maximize pt's functional independence and safety w/ mobility   Pt continues to be functioning below baseline level, and remains limited 2* factors listed above and including weakness, pain, decreased endurance, gait deviations,impaired balance  PT will continue to see pt during current hospitalization in order to address the deficits listed above and provide interventions consistent w/ POC in effort to achieve LTGs  Barriers to Discharge: None        PT Discharge Recommendation: Home with outpatient rehabilitation (maintained recommendation)          See flowsheet documentation for full assessment

## 2022-05-19 NOTE — PHYSICAL THERAPY NOTE
Physical Therapy Treatment Session Note    Patient's Name: Alexandr Romero    Admitting Diagnosis  Primary osteoarthritis of left knee [M17 12]    Problem List  Patient Active Problem List   Diagnosis    Obstructive sleep apnea syndrome    History of arthritis    History of gastrointestinal disease    History of hypertension    Lumbar spinal stenosis    Chronic pain syndrome    Neck pain    Back pain    Obesity (BMI 30-39  9)    Smoking    Anxiety    Fibromyalgia    Depression    Primary hypertension    Osteoarthritis of cervical spine with myelopathy    Status post insertion of spinal cord stimulator    Muscle weakness of left upper extremity    Primary osteoarthritis of left knee    Stage 3a chronic kidney disease (HCC)       Past Medical History  Past Medical History:   Diagnosis Date    Abdominal pain     Ambulates with cane     Anxiety     Arthritis     Back pain     Breathing difficulty     " Waking Up with trouble breathing" -Post MRI patient had issues    Chronic kidney disease     COPD (chronic obstructive pulmonary disease) ([facility] [address] )     COVID-19 long hauler     Patient states her PCP stated that she has this-Uses inhaler as needed    Depression     Diarrhea     Dizziness     Edentulous     Fibromyalgia     Fibromyalgia, primary     Full dentures     Upper and Lower  Can't wear right now due to recent surgery for sleep surgery    GERD (gastroesophageal reflux disease)     Heartburn     History of colon polyps     History of COVID-19 02/21/2022    Cough,fever,vomiting,diahhrea,HA and sore throat-Not Hospitalized    History of fall     History of HPV infection     Hypertension     IBS (irritable bowel syndrome)     Intermittent palpitations     Tachycardia    Leg pain, bilateral     Migraines     Neuropathy     Bilateral hands, arms   Numbness tops of legs and buttocks and feet    Pneumonia     History of walking pneumonia multiple times    Restless leg syndrome     Seasonal allergies     Shortness of breath Sleep apnea     History of- recent surgery to correct    Spinal stenosis     Narrowing    Urinary incontinence     Use of cane as ambulatory aid     Wears glasses        Past Surgical History  Past Surgical History:   Procedure Laterality Date    BLADDER SURGERY  02/10/2016    botox injections    BLOOD PATCH      post previous stim attempt    CERVICAL FUSION  2011, 1997, 2004    x4 C3-4, C4-5, C5-6, C6-7    CHOLECYSTECTOMY  2000    lap ad    COLONOSCOPY      COLONOSCOPY N/A 5/6/2016    Procedure: COLONOSCOPY;  Surgeon: Nadeem Redding MD;  [facility]; Service:     GYNECOLOGIC CRYOSURGERY      MD ARTHRODESIS ANT INTERBODY MIN DISCECTOMY,LUMBAR N/A 9/13/2016    Procedure: FUSION LUMBAR INTERBODY ANTERIOR APPROACH  L4-5 L5-S1  WITH POSTERIOR INSTRUMENTATION ;  Surgeon: Lotus Siemens, MD;  [facility];  Service: Orthopedics    MD EXCISION [facility] N/A 8/24/2016    Procedure: Lorbrice Bahena;  Surgeon: General Ary MD;  [facility];  Service: ENT    MD PALATOPHAYNGOPLASTY N/A 8/24/2016    Procedure: UVULOPALATOPHARYNGOPLASTY (UPPP);   Surgeon: General Ary MD;  [facility];  Service: ENT    MD PERCUT IMPLNT [address] N/A 4/9/2021    Procedure: INSERTION CERVICAL DORSAL COLUMN SPINAL CORD STIMULATOR PERCUTANEOUS PERMANENT TRIAL;  Surgeon: Yony Santoyo MD;  Location:  MAIN OR;  Service: Neurosurgery    SKIN BIOPSY  2015    R arm, scalp- all benign    SPINAL CORD STIMULATOR IMPLANT      lumbar  in place    SPINAL CORD STIMULATOR REMOVAL      cervical    SPINAL CORD STIMULATOR REMOVAL N/A 4/16/2021    Procedure: REMOVAL OF SPINAL CORD STIMULATOR, OR REMOVAL OF EXTENSIONS AND CONNECTION TO GENERATOR;  Surgeon: Yony Santoyo MD;  Location:  MAIN OR;  Service: Neurosurgery    TONSILLECTOMY      TUBAL LIGATION          05/19/22 1147   PT Last Visit   PT Visit Date 05/19/22   Note Type   Note Type BID visit/treatment   Pain Assessment   Pain Assessment Tool 0-10 Pain Score 8   Pain Location/Orientation Orientation: Left; Location: Knee   Pain Onset/Description Onset: Ongoing;Frequency: Constant/Continuous; Descriptor: Aching;Descriptor: Sore   Effect of Pain on Daily Activities yes   Patient's Stated Pain Goal No pain   Hospital Pain Intervention(s) Medication (See MAR); Repositioned; Ambulation/increased activity; Emotional support; Environmental changes; Elevated;Cold applied   Multiple Pain Sites No   Restrictions/Precautions   Weight Bearing Precautions Per Order Yes   LLE Weight Bearing Per Order (S)  FWB   Braces or Orthoses Other (Comment)  (CPM)   Other Precautions Chair Alarm;WBS;Fall Risk;Pain   General   Chart Reviewed Yes   Additional Pertinent History POD 1: L TKA as per Dr Jacey Cullen, BID treatment session  Response to Previous Treatment Patient with no complaints from previous session  Family/Caregiver Present Yes  (spouse Silvino)   Cognition   Overall Cognitive Status WFL   Arousal/Participation Alert; Responsive; Cooperative   Attention Within functional limits   Orientation Level Oriented X4   Memory Within functional limits   Following Commands Follows all commands and directions without difficulty   Comments Pt  agreeable to PT tx session,pleasant  Subjective   Subjective "I am going home soon'   Bed Mobility   Additional Comments DNT bed mobility as pt  was sitting out of bed on recliner prior/after session  Transfers   Sit to Stand   (CGA)   Additional items Assist x 1; Armrests; Increased time required;Verbal cues   Stand to Sit   (CGA)   Additional items Assist x 1; Armrests; Increased time required;Verbal cues   Stand pivot   (CGA)   Additional items Assist x 1; Increased time required;Verbal cues   Toilet transfer   (CGA)   Additional items Assist x 1; Increased time required;Verbal cues;Standard toilet   Additional Comments Verbal cues for appropriate hand placement, safety awareness with directional changes, proper AD usage     Ambulation/Elevation   Gait pattern Improper Weight shift; Antalgic;Decreased L stance; Short stride; Step to   Gait Assistance 5  Supervision  (close)   Additional items Assist x 1;Verbal cues   Assistive Device Rolling walker   Distance 50 feet   Stair Management Assistance Not tested  (pt  declined w/repeated inquiries-educated on proper pattern, acknowledged understanding )   Balance   Static Sitting Good   Dynamic Sitting Fair +   Static Standing Fair +   Dynamic Standing Fair +   Ambulatory Fair   Endurance Deficit   Endurance Deficit Yes   Activity Tolerance   Activity Tolerance Patient limited by pain   Medical Staff Made Aware yes, Kris Cano PA-C   Nurse Made Aware yes, Thania SAGE   Exercises   Quad Sets Sitting;25 reps;AROM; Bilateral   Glute Sets Sitting;25 reps;AROM; Bilateral   Hip Abduction Sitting;25 reps;AROM; Bilateral   Hip Adduction Sitting;25 reps;AROM; Bilateral   Knee AROM Short Arc Quad Sitting;25 reps;AROM; Bilateral   Ankle Pumps Sitting;25 reps;AROM; Bilateral   Assessment   Prognosis Good   Problem List Decreased strength;Decreased endurance; Impaired balance;Decreased mobility; Decreased skin integrity;Orthopedic restrictions;Pain   Assessment Pt seen for PT treatment session this date with interventions consisting of gait training to reduce the risk of medical complications w/ emphasis on improving pt's ability to ambulate level surfaces x 50 feet with close S provided by therapist with RW, Therapeutic exercise to increase LLE stability w/WB tasks consisting of: AROM 25 reps B LE in sitting position and therapeutic activity to reduce fall risk consisting of training: sit<>stand transfers, vc and tactile cues for static sitting posture faciliation, vc and tactile cues for static standing posture faciliation, stand pivot transfers towards B direction and toileting on standard toilet  Pt agreeable to PT treatment session upon arrival, pt found seated OOB in recliner, A&O x 4   In comparison to previous session, pt with improvements in ambulatory distance  Post session: pt returned back to recliner, chair alarm engaged, all needs in reach and RN notified of session findings/recommendations  Continue to recommend home with outpatient rehabilitation at time of d/c in order to maximize pt's functional independence and safety w/ mobility  Pt continues to be functioning below baseline level, and remains limited 2* factors listed above and including weakness, pain, decreased endurance, gait deviations,impaired balance  PT will continue to see pt during current hospitalization in order to address the deficits listed above and provide interventions consistent w/ POC in effort to achieve LTGs  Barriers to Discharge None   Goals   Patient Goals to go home soon   LTG Expiration Date 05/28/22   Long Term Goal #1 LTGs remain appropriate   PT Treatment Day 2   Plan   Treatment/Interventions Functional transfer training;LE strengthening/ROM; Elevations; Therapeutic exercise; Endurance training;Patient/family training;Equipment eval/education; Bed mobility;Gait training;Spoke to nursing;Spoke to advanced practitioner   Progress Improving as expected   PT Frequency Twice a day   Recommendation   PT Discharge Recommendation Home with outpatient rehabilitation  (maintained recommendation)   Equipment Recommended [facility] Recommended Wheeled walker   Change/add to [employer]? No   Additional Comments Upon conclusion, pt  was resting out of bed on recliner w/all needs within reach and BLEs elevated  Additional Comments 2 Pt's raw score on the [facility] Basic Mobility inpatient short form is 19, standardized score is 42 48  Patients at this level are likely to benefit from DC to home  However, please refer to therapist recommendation for safe DC planning     AM-PAC Basic Mobility Inpatient   Turning in Bed Without Bedrails 4   Lying on Back to Sitting on Edge of Flat Bed 3   Moving Bed to Chair 3   Standing Up From Chair 3   Walk in Room 3   Climb 3-5 Stairs 3   Basic Mobility Inpatient Raw Score 19   Basic Mobility Standardized Score 42 48   Highest Level Of Mobility   -HLM Goal 6: Walk 10 steps or more   -HLM Achieved 7: Walk 25 feet or more     Treatment Time: 3261-2855    Vincenzo Cruz, PT

## 2022-05-19 NOTE — NURSING NOTE
Pt refusing CPM and SCD on LLE due to pain  Administered pain medication and educated pt on the purpose of these devices, pt continues to refuse  SCD remains in place on RLE

## 2022-05-20 ENCOUNTER — EVALUATION (OUTPATIENT)
Dept: PHYSICAL THERAPY | Facility: CLINIC | Age: 57
End: 2022-05-20
Payer: MEDICARE

## 2022-05-20 ENCOUNTER — TELEPHONE (OUTPATIENT)
Dept: OBGYN CLINIC | Facility: HOSPITAL | Age: 57
End: 2022-05-20

## 2022-05-20 DIAGNOSIS — Z96.652 S/P TOTAL KNEE ARTHROPLASTY, LEFT: ICD-10-CM

## 2022-05-20 DIAGNOSIS — M17.12 PRIMARY OSTEOARTHRITIS OF LEFT KNEE: Primary | ICD-10-CM

## 2022-05-20 DIAGNOSIS — M25.562 LEFT KNEE PAIN, UNSPECIFIED CHRONICITY: ICD-10-CM

## 2022-05-20 PROCEDURE — 97163 PT EVAL HIGH COMPLEX 45 MIN: CPT | Performed by: PHYSICAL THERAPIST

## 2022-05-20 PROCEDURE — 97110 THERAPEUTIC EXERCISES: CPT | Performed by: PHYSICAL THERAPIST

## 2022-05-20 NOTE — TELEPHONE ENCOUNTER
S/w pt  She reports she is feeling a little better since she has been up to PT and is now more mobile since we spoke this AM  She was advised she can r/o to PCP or pain specialist if she needs further pain recs  She reports she will consider that if she feels she needs additional recs for future but she reports she is "doing OK now actually"  She will continue to monitor her pain, continue to ambulate as tolerated and participate with PT  She denies other questions or needs  Pt encouraged to call with any questions, concerns or issues  NN episode closed  No further action required at this time, patient is ready for CM handoff

## 2022-05-20 NOTE — TELEPHONE ENCOUNTER
The patient is basically maxed out with her pain medication  She was prescribed 10 mg of Percocet at discharge which she was taking on admission as well    She may want to discuss further adjuvant medication for pain with her pain management physician or her primary care physician

## 2022-05-25 ENCOUNTER — OFFICE VISIT (OUTPATIENT)
Dept: PHYSICAL THERAPY | Facility: CLINIC | Age: 57
End: 2022-05-25
Payer: MEDICARE

## 2022-05-25 ENCOUNTER — TELEPHONE (OUTPATIENT)
Dept: OBGYN CLINIC | Facility: HOSPITAL | Age: 57
End: 2022-05-25

## 2022-05-25 DIAGNOSIS — M17.12 PRIMARY OSTEOARTHRITIS OF LEFT KNEE: Primary | ICD-10-CM

## 2022-05-25 DIAGNOSIS — M25.562 LEFT KNEE PAIN, UNSPECIFIED CHRONICITY: ICD-10-CM

## 2022-05-25 DIAGNOSIS — Z96.652 S/P TOTAL KNEE ARTHROPLASTY, LEFT: ICD-10-CM

## 2022-05-25 PROCEDURE — 97116 GAIT TRAINING THERAPY: CPT

## 2022-05-25 PROCEDURE — 97110 THERAPEUTIC EXERCISES: CPT

## 2022-05-25 PROCEDURE — 97535 SELF CARE MNGMENT TRAINING: CPT

## 2022-05-25 PROCEDURE — 97112 NEUROMUSCULAR REEDUCATION: CPT

## 2022-05-25 NOTE — PROGRESS NOTES
Daily Note     Today's date: 2022  Patient name: Emmie Mcbride  : 1965  MRN: [id number]  Referring provider: Jacqui Hannah  Dx:   Encounter Diagnosis     ICD-10-CM    1  Primary osteoarthritis of left knee  M17 12    2  Left knee pain, unspecified chronicity  M25 562    3  S/P total knee arthroplasty, left  [id number] [id number]        Start Time: 1118          Subjective: She wants to have therapy till   She has to got to [place] after that for 2 weeks  If she needs therapy after wards she will come back  Presently she states her pain is a 7-8/10 in the left knee  She has been doing her exercises at home and her  has been massaging her leg for her  She feels the incision is a little more red and pussy at times even though she has been doing the betadine  Objective: See treatment diary below    Patient's incision has more redness and her drain area now is red around wound bed  Patient and  were instructed to call the MD about possible infection  She continues with a lot of leg swelling and was educated on proper elevation of leg with massage technique for edema control  Patient's home exercises program updated to include additional exercises  Handout issued and explained with demonstration  Patient accepts new exercises  Patient now using quad cane in home and was educated when to use walker verses cane for safety since left her knee still gives out  Assessment: Tolerated treatment fair+  Patient would benefit from continued PT for stretching and strengthening  Patient's home exercise program updated to include additional exercises  She continues to have limited knee extension (stressed to patient importance of working on)  Patient and  seemed to understand all education given during session  She felt very tired when she left department  Plan: Continue per plan of care  Progress treatment as tolerated             Re-Evaluation:   PRECAUTIONS:SIGNIFICANT MEDICAL HX  Knee Specialty Daily Treatment Diary     Manual Therapy 5/20 5/25              Hamstring stretch   *     Prone Quad stretch        Patellar mobs        Knee stretch on edge of mat   *flex/ext         Ther Ex 5/20 5/25      Nu Step S=7  *L1 x5 min      Biodex Bike S=        Heel slides flex/abd X10 np      PROM knee        EXT BOARD        Prone knee flex        ANKLE PUMP X20 x20      SLR all planes        Ball squeeze  *:05x10      LAQ X10 x10      Hamstring stretch  :30x3      Calf stretch  *:30x3      Standing hamstring curls  *x10              Mini Squat                TKE        Total gym squats (deep)        Neuro Re-ed        Tandem stand        GLUTEAL SET X10 x10      QS X10 Knee roll x10      Wall slides with feet stagger   *     [employer] board        Eccentric Leg press        Clam Shells        QS+ SLR        [place]        GAIT Training        sidestepping  *attempted       Heel-toe amb        Mirror walking  * w/ RW x10 min  March, bend, heel/toe      Ther Act        Amb up/down steps        Chair squats        Crate carry        Step ups            Modalities        CP  KNEE                          Access Code: [email or web address]  URL: [email or web address]  Date: 05/25/2022  Prepared by: Gabriella Stuart    Exercises  · Seated March - 2 x daily - 7 x weekly - 1 sets - 10 reps - 3 sec hold  · Standing Knee Flexion AROM with Chair Support - 2 x daily - 7 x weekly - 1 sets - 10 reps - 3 sec hold  · Seated Gastroc Stretch with Strap - 2-3 x daily - 7 x weekly - 1 sets - 3 reps - 30 sec hold  · Seated Hamstring Stretch - 2-3 x daily - 7 x weekly - 1 sets - 3 reps - 30 sec hold  · Supine Hip Adduction Isometric with Ball - 2 x daily - 7 x weekly - 1 sets - 10 reps - 5 sec hold

## 2022-05-25 NOTE — TELEPHONE ENCOUNTER
Pt contacted  She had PT today, Total joint last week 5/18-    She reports "yellow and clear" discharge and redness is spreading away from incision  She reports knee is "hot" and denies fevers  Next follow up not until 6/3  She would prefer appt at [address] [facility], but understands surgeon may want to evaluate ASAP  She is aware I will route to surgeon and contact her with instruction

## 2022-05-25 NOTE — TELEPHONE ENCOUNTER
DR Johnson//TKA  RE: Possibly infected    Patient left a VM on 05/25 around 2pm stating her physical therapist advised her knee looks infected    I called patient back and offered to make appt with Precious Chairez tomorrow, 05 26, but patient declined       Patient asked to speak to a clinical member

## 2022-05-25 NOTE — TELEPHONE ENCOUNTER
Continue local wound care with painting  the incision with betadine  twice a day as directed upon discharge  Would recommend keeping regularly schedule appointment

## 2022-05-26 ENCOUNTER — APPOINTMENT (OUTPATIENT)
Dept: PHYSICAL THERAPY | Facility: CLINIC | Age: 57
End: 2022-05-26
Payer: MEDICARE

## 2022-05-26 NOTE — PROGRESS NOTES
Daily Note     Today's date: 2022  Patient name: Jason De La Vega  : 1965  MRN: [id number]  Referring provider: Tiago Romero  Dx:   Encounter Diagnosis     ICD-10-CM    1  Primary osteoarthritis of left knee  M17 12    2  Left knee pain, unspecified chronicity  M25 562    3  S/P total knee arthroplasty, left  D75 016                   Subjective: ***      Objective: See treatment diary below      Assessment: Tolerated treatment {Tolerated treatment :5898148565}  Patient {assessment:[id number]}      Plan: {PLAN:5300874251}         Re-Evaluation:   [address] HX  Knee Specialty Daily Treatment Diary     Manual Therapy               Hamstring stretch   *     Prone Quad stretch        Patellar mobs        Knee stretch on edge of mat   *flex/ext         Ther Ex       Nu Step S=7  *L1 x5 min      Biodex Bike S=        Heel slides flex/abd X10 np      PROM knee        EXT BOARD        Prone knee flex        ANKLE PUMP X20 x20      SLR all planes        Ball squeeze  *:05x10      LAQ X10 x10      Hamstring stretch  :30x3      Calf stretch  *:30x3      Standing hamstring curls  *x10              Mini Squat                TKE        Total gym squats (deep)        Neuro Re-ed        Tandem stand        GLUTEAL SET X10 x10      QS X10 Knee roll x10      Wall slides with feet stagger   *     [employer] board        Eccentric Leg press        Clam Shells        QS+ SLR        [place]        GAIT Training        sidestepping  *attempted       Heel-toe amb        Mirror walking  * w/ RW x10 min  March, bend, heel/toe      Ther Act        Amb up/down steps        Chair squats        Crate carry        Step ups            Modalities        CP  KNEE                          Access Code: [email or web address]  URL: [email or web address]  Date: 2022  Prepared by: Ruba Stuart    Exercises  · Seated March - 2 x daily - 7 x weekly - 1 sets - 10 reps - 3 sec hold  · Standing Knee Flexion AROM with Chair Support - 2 x daily - 7 x weekly - 1 sets - 10 reps - 3 sec hold  · Seated Gastroc Stretch with Strap - 2-3 x daily - 7 x weekly - 1 sets - 3 reps - 30 sec hold  · Seated Hamstring Stretch - 2-3 x daily - 7 x weekly - 1 sets - 3 reps - 30 sec hold  · Supine Hip Adduction Isometric with Ball - 2 x daily - 7 x weekly - 1 sets - 10 reps - 5 sec hold

## 2022-05-26 NOTE — TELEPHONE ENCOUNTER
Please place order for surgery  Thank you! VM left for patient to call back to discuss surgeons recommendations

## 2022-06-01 ENCOUNTER — OFFICE VISIT (OUTPATIENT)
Dept: PHYSICAL THERAPY | Facility: CLINIC | Age: 57
End: 2022-06-01
Payer: MEDICARE

## 2022-06-01 DIAGNOSIS — Z96.652 S/P TOTAL KNEE ARTHROPLASTY, LEFT: ICD-10-CM

## 2022-06-01 DIAGNOSIS — M17.12 PRIMARY OSTEOARTHRITIS OF LEFT KNEE: Primary | ICD-10-CM

## 2022-06-01 DIAGNOSIS — M25.562 LEFT KNEE PAIN, UNSPECIFIED CHRONICITY: ICD-10-CM

## 2022-06-01 PROCEDURE — 97112 NEUROMUSCULAR REEDUCATION: CPT

## 2022-06-01 PROCEDURE — 97110 THERAPEUTIC EXERCISES: CPT

## 2022-06-01 NOTE — PROGRESS NOTES
Daily Note     Today's date: 2022  Patient name: Jason De La Vega  : 1965  MRN: [id number]  Referring provider: Tiago Romero  Dx:   Encounter Diagnosis     ICD-10-CM    1  Primary osteoarthritis of left knee  M17 12    2  Left knee pain, unspecified chronicity  M25 562    3  S/P total knee arthroplasty, left  [id number]        Start Time: 1445          Subjective: Patient asked how is her knee she stated so/ so  When PT Maria Roberts asked her how she was doing and she states she feels "good"  She reports doing her exercises all the time at home  Patient states her staple got stuck on the ace wrap so she removed the staple  Also she stated she removed two more staples with medical hemostat she picked up because they bothered her  She feels she will take care of her incision herself and clean out the area  She states she is now able to do step over step up stairs, but one at a time down stairs  She denied fever  Patient states she has been elevating her leg regularly at home  Objective: See treatment diary below    Patient's incision covered with ace wrap  Ace appeared to be clean and new  Home exercise program updated to include additional exercises  Handout issued and explained with demonstration  Patient accepts new exercises  Assessment: Tolerated treatment fair+  Patient would benefit from continued PT for stretching and strengthening  She was able to add exercises to her program slowly  She had difficulty laying due to her back stimulator that she did not want to turn off  She continues to be limited in knee extension  Fatigue sets in quickly with walking activities with walker  Patient was tired when she left department  Plan: Continue per plan of care  Progress treatment as tolerated             Re-Evaluation:   PRECAUTIONS:SIGNIFICANT MEDICAL HX  Knee Specialty Daily Treatment Diary     Manual Therapy              Hamstring stretch        Prone Quad stretch Patellar mobs        Knee stretch on edge of mat            Ther Ex 5/20 5/25 6/1     Nu Step S=7  *L1 x5 min L1 x6 min     Biodex Bike S=        Heel slides flex/abd X10 np x10 ea     PROM knee        EXT BOARD        Prone knee flex        ANKLE PUMP X20 x20 x20     SLR all planes   *2x5 w/ assist     Ball squeeze  *:05x10 :05x10     LAQ X10 x10 :05x10     Hamstring stretch  :30x3 :30x3     Calf stretch  *:30x3 Strap :30x3     Standing hamstring curls  *x10 x10             Mini Squat                TKE        Total gym squats (deep)        Neuro Re-ed        Tandem stand        GLUTEAL SET X10 x10 :05x10     QS X10 Knee roll x10 Knee roll :05 x10     Wall slides with feet stagger   *nv     Bridges   * :03 x10     Fitter board        Eccentric Leg press        Clam Shells        QS+ SLR        [place]        GAIT Training        sidestepping  *attempted       Heel-toe amb        Mirror walking  * w/ RW x10 min  March, bend, heel/toe x5 min fatigued  Heel /toe,march     Ther Act        Amb up/down steps        Chair squats        Crate carry        Step ups            Modalities        CP  KNEE                          Access Code: [email or web address]  URL: [email or web address]  Date: 06/01/2022  Prepared by: Elizabeth Stuart    Exercises  · Seated March - 2 x daily - 7 x weekly - 1 sets - 10 reps - 3 sec hold  · Standing Knee Flexion AROM with Chair Support - 2 x daily - 7 x weekly - 1 sets - 10 reps - 3 sec hold  · Seated Gastroc Stretch with Strap - 2-3 x daily - 7 x weekly - 1 sets - 3 reps - 30 sec hold  · Seated Hamstring Stretch - 2-3 x daily - 7 x weekly - 1 sets - 3 reps - 30 sec hold  · Supine Hip Adduction Isometric with Ball - 2 x daily - 7 x weekly - 1 sets - 10 reps - 5 sec hold  · Supine Straight Leg Raises - 4 x daily - 7 x weekly - 1 sets - 5 reps  · Supine Bridge - 2 x daily - 7 x weekly - 1 sets - 10 reps - 3 sec hold      ·

## 2022-06-03 ENCOUNTER — OFFICE VISIT (OUTPATIENT)
Dept: OBGYN CLINIC | Facility: CLINIC | Age: 57
End: 2022-06-03

## 2022-06-03 ENCOUNTER — OFFICE VISIT (OUTPATIENT)
Dept: PHYSICAL THERAPY | Facility: CLINIC | Age: 57
End: 2022-06-03
Payer: MEDICARE

## 2022-06-03 VITALS
SYSTOLIC BLOOD PRESSURE: 93 MMHG | DIASTOLIC BLOOD PRESSURE: 66 MMHG | HEART RATE: 116 BPM | BODY MASS INDEX: 32.92 KG/M2 | HEIGHT: 62 IN

## 2022-06-03 DIAGNOSIS — M25.562 LEFT KNEE PAIN, UNSPECIFIED CHRONICITY: ICD-10-CM

## 2022-06-03 DIAGNOSIS — Z96.652 S/P TOTAL KNEE ARTHROPLASTY, LEFT: ICD-10-CM

## 2022-06-03 DIAGNOSIS — Z96.652 S/P TOTAL KNEE ARTHROPLASTY, LEFT: Primary | ICD-10-CM

## 2022-06-03 DIAGNOSIS — M17.12 PRIMARY OSTEOARTHRITIS OF LEFT KNEE: Primary | ICD-10-CM

## 2022-06-03 DIAGNOSIS — M17.12 PRIMARY OSTEOARTHRITIS OF LEFT KNEE: ICD-10-CM

## 2022-06-03 PROCEDURE — 99024 POSTOP FOLLOW-UP VISIT: CPT | Performed by: ORTHOPAEDIC SURGERY

## 2022-06-03 PROCEDURE — 97110 THERAPEUTIC EXERCISES: CPT

## 2022-06-03 PROCEDURE — 97116 GAIT TRAINING THERAPY: CPT

## 2022-06-03 RX ORDER — OXYCODONE AND ACETAMINOPHEN 7.5; 325 MG/1; MG/1
TABLET ORAL
COMMUNITY
Start: 2022-05-31

## 2022-06-03 NOTE — PROGRESS NOTES
ASSESSMENT/PLAN:    Diagnoses and all orders for this visit:    S/P total knee arthroplasty, left    Primary osteoarthritis of left knee  -     XR knee 3 vw left non injury; Future    Other orders  -     oxyCODONE-acetaminophen (PERCOCET) 7 5-325 MG per tablet        X-rays of the patient's left knee are consistent with a well-seated left total knee replacement  The prosthesis is in good alignment  There are no signs of loosening  There are no fractures or dislocations  The patient should continue physical therapy and occupational therapy weight-bearing as tolerated to work on strengthening, stretching and range of motion  She can now transition to aspirin 325 mg daily for DVT prophylaxis  She will follow up with our office in 1 month for strength and motion check  The patient is acceptable to this plan  Return in about 1 month (around 7/3/2022)  _____________________________________________________  CHIEF COMPLAINT:  Chief Complaint   Patient presents with    Left Knee - Post-op         SUBJECTIVE:  Alexandr Romero is a 65 y o  female who presents to our office for postop visit  The patient is status post left total knee replacement from 05/18/2022  She states she has been doing very well since surgery  He has been participating in physical therapy weight-bearing as tolerated  Her pain is improving  She denies any numbness or tingling  She denies any fever or chills        The following portions of the patient's history were reviewed and updated as appropriate: allergies, current medications, past family history, past medical history, past social history, past surgical history and problem list     PAST MEDICAL HISTORY:  Past Medical History:   Diagnosis Date    Abdominal pain     Ambulates with cane     Anxiety     Arthritis     Back pain     Breathing difficulty     " Waking Up with trouble breathing" -Post MRI patient had issues    Chronic kidney disease     COPD (chronic obstructive pulmonary disease) ([facility] [address] )     COVID-19 long hauler     Patient states her PCP stated that she has this-Uses inhaler as needed    Depression     Diarrhea     Dizziness     Edentulous     Fibromyalgia     Fibromyalgia, primary     Full dentures     Upper and Lower  Can't wear right now due to recent surgery for sleep surgery    GERD (gastroesophageal reflux disease)     Heartburn     History of colon polyps     History of COVID-19 02/21/2022    Cough,fever,vomiting,diahhrea,HA and sore throat-Not Hospitalized    History of fall     History of HPV infection     Hypertension     IBS (irritable bowel syndrome)     Intermittent palpitations     Tachycardia    Leg pain, bilateral     Migraines     Neuropathy     Bilateral hands, arms  Numbness tops of legs and buttocks and feet    Pneumonia     History of walking pneumonia multiple times    Restless leg syndrome     Seasonal allergies     Shortness of breath     Sleep apnea     History of- recent surgery to correct    Spinal stenosis     Narrowing    Urinary incontinence     Use of cane as ambulatory aid     Wears glasses        PAST SURGICAL HISTORY:  Past Surgical History:   Procedure Laterality Date    BLADDER SURGERY  02/10/2016    botox injections    BLOOD PATCH      post previous stim attempt   [address] [facility]  2011, 1997, 2004    x4 C3-4, C4-5, C5-6, C6-7    CHOLECYSTECTOMY  2000    lap ad    COLONOSCOPY      COLONOSCOPY N/A 5/6/2016    Procedure: COLONOSCOPY;  Surgeon: Josey Daugherty MD;  [facility];   Service:     GYNECOLOGIC CRYOSURGERY      MO ARTHRODESIS ANT INTERBODY MIN DISCECTOMY,LUMBAR N/A 9/13/2016    Procedure: FUSION LUMBAR INTERBODY ANTERIOR APPROACH  L4-5 L5-S1  WITH POSTERIOR INSTRUMENTATION ;  Surgeon: Reece Diaz MD;  Location: [facility];  Service: Orthopedics    MO EXCISION [facility] N/A 8/24/2016    Procedure: Maty Nieves;  Surgeon: Anna Almonte MD;  [facility]: [facility]; Service: ENT    [place] PALATOPHAYNGOPLASTY N/A 8/24/2016    Procedure: UVULOPALATOPHARYNGOPLASTY (UPPP); Surgeon: Vidal Lima MD;  [facility];  Service: ENT    [place] PERCUT IMPLNT [address] N/A 4/9/2021    Procedure: INSERTION CERVICAL DORSAL COLUMN SPINAL CORD STIMULATOR PERCUTANEOUS PERMANENT TRIAL;  Surgeon: Savanna Wiseman MD;  [facility];  Service: Neurosurgery    [place] TOTAL KNEE ARTHROPLASTY Left 5/18/2022    Procedure: KNEE TOTAL ARTHROPLASTY;  Surgeon: Karyle Sites, DO;  [facility]: [facility];  Service: Orthopedics    SKIN BIOPSY  2015    R arm, scalp- all benign    SPINAL CORD STIMULATOR IMPLANT      lumbar  in place    SPINAL CORD STIMULATOR REMOVAL      cervical    SPINAL CORD STIMULATOR REMOVAL N/A 4/16/2021    Procedure: REMOVAL OF SPINAL CORD STIMULATOR, OR REMOVAL OF EXTENSIONS AND CONNECTION TO GENERATOR;  Surgeon: Savanna Wiseman MD;  [facility]: [facility];  Service: Neurosurgery    TONSILLECTOMY      TUBAL LIGATION         FAMILY HISTORY:  Family History   Problem Relation Age of Onset    Thyroid cancer Mother     Thyroid cancer Father     Alcohol abuse Sister     No Known Problems Brother     Cancer Family        SOCIAL HISTORY:  Social History     Tobacco Use    Smoking status: Current Every Day Smoker     Packs/day: 1 00     Years: 38 00     Pack years: 38 00    Smokeless tobacco: Never Used    Tobacco comment: encouraged smoking cessation   Vaping Use    Vaping Use: Never used   Substance Use Topics    Alcohol use: No    Drug use: Yes     Frequency: 7 0 times per week     Types: Marijuana     Comment: CBD oil, THC for pain  Last used yesterday         MEDICATIONS:    Current Outpatient Medications:     Capsaicin-Menthol (SALONPAS GEL-PATCH HOT EX), Apply 1 patch topically as needed, Disp: , Rfl:     Cholecalciferol (Vitamin D3) 125 MCG (5000 UT) CAPS, Take 1 capsule by mouth in the morning Prescribed by surgeon, Disp: , Rfl:     Citalopram Hydrobromide (CELEXA PO), Take 40 mg by mouth daily in the early morning  , Disp: , Rfl:     DULoxetine (CYMBALTA) 60 mg delayed release capsule, Take 60 mg by mouth 2 (two) times a day   , Disp: , Rfl:     fluticasone-vilanterol (Breo Ellipta) 100-25 mcg/inh inhaler, Inhale 1 puff as needed  , Disp: , Rfl:     gabapentin (NEURONTIN) 300 mg capsule, Take 900 mg by mouth see administration instructions 3 capsuless of 300mg = 900 mg TID , Disp: , Rfl:     lisinopril (ZESTRIL) 20 mg tablet, Take 20 mg by mouth 2 (two) times a day  , Disp: , Rfl:     Menthol, Topical Analgesic, (BIOFREEZE EX), Apply topically, Disp: , Rfl:     omeprazole (PriLOSEC) 40 MG capsule, Take 40 mg by mouth daily in the early morning  , Disp: , Rfl:     oxyCODONE-acetaminophen (PERCOCET) 7 5-325 MG per tablet, , Disp: , Rfl:     rOPINIRole (REQUIP) 2 mg tablet, Take 2 mg by mouth daily at bedtime  , Disp: , Rfl:     ascorbic acid (VITAMIN C) 500 MG tablet, Take 1 tablet (500 mg total) by mouth 2 (two) times a day, Disp: 60 tablet, Rfl: 1    ferrous sulfate 324 (65 Fe) mg, Take 1 tablet (324 mg total) by mouth 2 (two) times a day before meals (Patient not taking: Reported on 6/3/2022), Disp: 60 tablet, Rfl: 1    folic acid (FOLVITE) 1 mg tablet, Take 1 tablet (1 mg total) by mouth daily (Patient not taking: Reported on 6/3/2022), Disp: 30 tablet, Rfl: 1    fondaparinux (ARIXTRA) 2 5 mg/0 5 mL, Inject 2 5 mg under the skin every 24 hours for 14 days, Disp: 7 mL, Rfl: 0    ALLERGIES:  Allergies   Allergen Reactions    Strawberry Extract - Food Allergy Anaphylaxis    Contrast [Iodinated Diagnostic Agents] Hives     CT scan dye       ROS:  Review of Systems     Constitutional: Negative for fatigue, fever or loss of appetite  HENT: Negative  Respiratory: Negative for shortness of breath, dyspnea  Cardiovascular: Negative for chest pain/tightness  Gastrointestinal: Negative for abdominal pain, N/V     Endocrine: Negative for cold/heat intolerance, unexplained weight loss/gain  Genitourinary: Negative for flank pain, dysuria, hematuria  Musculoskeletal:  Negative for arthralgia   Skin: Negative for rash  Neurological: Negative for numbness or tingling  Psychiatric/Behavioral: Negative for agitation  _____________________________________________________  PHYSICAL EXAMINATION:    Blood pressure 93/66, pulse (!) 116, height 5' 2" (1 575 m), not currently breastfeeding  Constitutional: Oriented to person, place, and time  Appears well-developed and well-nourished  No distress  HENT:   Head: Normocephalic  Eyes: Conjunctivae are normal  Right eye exhibits no discharge  Left eye exhibits no discharge  No scleral icterus  Cardiovascular: Normal rate  Pulmonary/Chest: Effort normal    Neurological: Alert and oriented to person, place, and time  Skin: Skin is warm and dry  No rash noted  Not diaphoretic  No erythema  No pallor  Psychiatric: Normal mood and affect  Behavior is normal  Judgment and thought content normal       MUSCULOSKELETAL EXAMINATION:   Physical Exam  Ortho Exam    Left lower extremity is neurovascularly intact  Toes are pink and mobile  Compartments are soft  No ligament laxity  Incision is clean, dry intact  Range of motion of the knee is from 0-100 degrees  Brisk cap refill  Sensation intact  Objective:  BP Readings from Last 1 Encounters:   06/03/22 93/66      Wt Readings from Last 1 Encounters:   05/18/22 81 6 kg (180 lb)        BMI:   Estimated body mass index is 32 92 kg/m² as calculated from the following:    Height as of this encounter: 5' 2" (1 575 m)  Weight as of 5/18/22: 81 6 kg (180 lb)          Scribe Attestation    I,:  Catalino Miller PA-C am acting as a scribe while in the presence of the attending physician :       I,:  Vanna Fang DO personally performed the services described in this documentation    as scribed in my presence :

## 2022-06-03 NOTE — PROGRESS NOTES
Daily Note     Today's date: 6/3/2022  Patient name: Jose M Cottrell  : 1965  MRN: [id number]  Referring provider: Candy Lopez  Dx:   Encounter Diagnosis     ICD-10-CM    1  Primary osteoarthritis of left knee  M17 12    2  Left knee pain, unspecified chronicity  M25 562    3  S/P total knee arthroplasty, left  [id number]                   Subjective: Patient states she is so/so today with the knee  She sees MD today for staple removal and she is happy about it  She states she has been popping puss dots along the incission  She has been cleaning incisions with Hibiclens in the shower  Patient states she is now performing 5" step overs step at home to get in home with 1 hand rail and quad cane (13 steps)  She uses her quad cane in the home at all times  Next week she see the surgeon in [facility] for wrist surgery that she wants done soon  Objective: See treatment diary below    Patient showed up late for appointment today  She had MD appointment after today's session and due to time restraints, program modified  Her redness has decreased, but it is still present around incision  She showed with no bandage on knee  Not all staples are present  She has scab on drain area removed and some depth is present in wound bed  Redness also circling area  While explaining her care of incision, she felt the areas where staples were gone are being held by the scab (which pulled on to to show it)  Attempted to educate patient on skin care, balance with walking, and stretching calf during session  Left leg continues to be swollen  Assessment: Tolerated treatment fair+  Patient would benefit from continued PT for strengthening and stretching  Patient was able to add exercises to her program with little difficulty and discomfort  She was able to perform more walking activities, but impulsiveness and processing of activities makes her a safety/ fall risk  She has forward flexed posture in standing and during walking  She felt tired, but ok when she left department  Plan: Continue per plan of care  Progress treatment as tolerated  Re-Evaluation: 6/17  PRECAUTIONS:SIGNIFICANT MEDICAL HX  Knee Specialty Daily Treatment Diary     Manual Therapy 5/20 5/25 6/1 6/3            Hamstring stretch        Prone Quad stretch        Patellar mobs        Knee stretch on edge of mat            Ther Ex 5/20 5/25 6/1 6/3    Nu Step S=7  *L1 x5 min L1 x6 min L1 x7 min    Biodex Bike S=        Heel slides flex/abd X10 np x10 ea     PROM knee        EXT BOARD        Prone knee flex        ANKLE PUMP X20 x20 x20     SLR all planes   *2x5 w/ assist     Ball squeeze  *:05x10 :05x10     LAQ X10 x10 :05x10     Hamstring stretch  :30x3 :30x3     Calf stretch  *:30x3 Strap :30x3 Wedge :30x3    Standing hamstring curls  *x10 x10             Mini Squat                TKE        Total gym squats (deep)        Neuro Re-ed        Tandem stand        GLUTEAL SET X10 x10 :05x10     QS X10 Knee roll x10 Knee roll :05 x10     Wall slides with feet stagger   *nv *NV    Bridges   * :03 x10     Fitter board        Eccentric Leg press        Clam Shells        QS+ SLR        [place]        GAIT Training        sidestepping  *attempted   *// bars 10 ft x4    Heel-toe amb    *// bars 10 ft x4    Mirror walking  * w/ RW x10 min  March, bend, heel/toe x5 min fatigued  Heel /toe,march RW mirror heel/toe, march, knee bend    Ther Act        Amb up/down steps        Chair squats        Crate carry        Step ups    *4" x10 ea        Modalities        CP  KNEE                          Access Code: [email or web address]  URL: [email or web address]  Date: 06/01/2022  Prepared by: Ellis Stuart    Exercises  · Seated March - 2 x daily - 7 x weekly - 1 sets - 10 reps - 3 sec hold  · Standing Knee Flexion AROM with Chair Support - 2 x daily - 7 x weekly - 1 sets - 10 reps - 3 sec hold  · Seated Gastroc Stretch with Strap - 2-3 x daily - 7 x weekly - 1 sets - 3 reps - 30 sec hold  · Seated Hamstring Stretch - 2-3 x daily - 7 x weekly - 1 sets - 3 reps - 30 sec hold  · Supine Hip Adduction Isometric with Ball - 2 x daily - 7 x weekly - 1 sets - 10 reps - 5 sec hold  · Supine Straight Leg Raises - 4 x daily - 7 x weekly - 1 sets - 5 reps  · Supine Bridge - 2 x daily - 7 x weekly - 1 sets - 10 reps - 3 sec hold      ·

## 2022-06-03 NOTE — PROGRESS NOTES
Assessment/Plan:   Diagnoses and all orders for this visit:    S/P total knee arthroplasty, left    Primary osteoarthritis of left knee  -     XR knee 3 vw left non injury; Future    Other orders  -     oxyCODONE-acetaminophen (PERCOCET) 7 5-325 MG per tablet         ***    Subjective:   Patient ID: Ayad Sparks  1965     HPI  Patient is a 65 y o  female who presents for follow-up evaluation 2 weeks s/p left TKA performed 5/18/2022  The following portions of the patient's history were reviewed and updated as appropriate:  Past medical history, past surgical history, Family history, social history, current medications and allergies    Past Medical History:   Diagnosis Date    Abdominal pain     Ambulates with cane     Anxiety     Arthritis     Back pain     Breathing difficulty     " Waking Up with trouble breathing" -Post MRI patient had issues    Chronic kidney disease     COPD (chronic obstructive pulmonary disease) ([facility] [address] )     COVID-19 long hauler     Patient states her PCP stated that she has this-Uses inhaler as needed    Depression     Diarrhea     Dizziness     Edentulous     Fibromyalgia     Fibromyalgia, primary     Full dentures     Upper and Lower  Can't wear right now due to recent surgery for sleep surgery    GERD (gastroesophageal reflux disease)     Heartburn     History of colon polyps     History of COVID-19 02/21/2022    Cough,fever,vomiting,diahhrea,HA and sore throat-Not Hospitalized    History of fall     History of HPV infection     Hypertension     IBS (irritable bowel syndrome)     Intermittent palpitations     Tachycardia    Leg pain, bilateral     Migraines     Neuropathy     Bilateral hands, arms   Numbness tops of legs and buttocks and feet    Pneumonia     History of walking pneumonia multiple times    Restless leg syndrome     Seasonal allergies     Shortness of breath     Sleep apnea     History of- recent surgery to correct    Spinal stenosis Narrowing    Urinary incontinence     Use of cane as ambulatory aid     Wears glasses        Past Surgical History:   Procedure Laterality Date    BLADDER SURGERY  02/10/2016    botox injections    BLOOD PATCH      post previous stim attempt   [address]  2011, 1997, 2004    x4 C3-4, C4-5, C5-6, C6-7    CHOLECYSTECTOMY  2000    lap ad    COLONOSCOPY      COLONOSCOPY N/A 5/6/2016    Procedure: COLONOSCOPY;  Surgeon: Nadeem Redding MD;  [facility]: [facility]; Service:     GYNECOLOGIC CRYOSURGERY      OH ARTHRODESIS ANT INTERBODY MIN DISCECTOMY,LUMBAR N/A 9/13/2016    Procedure: FUSION LUMBAR INTERBODY ANTERIOR APPROACH  L4-5 L5-S1  WITH POSTERIOR INSTRUMENTATION ;  Surgeon: Lotus Siemens, MD;  Location: [facility];  Service: Orthopedics    OH EXCISION [facility] [facility] N/A 8/24/2016    Procedure: Lorre Shruti;  Surgeon: General Ary MD;  [facility];  Service: ENT    OH PALATOPHAYNGOPLASTY N/A 8/24/2016    Procedure: UVULOPALATOPHARYNGOPLASTY (UPPP);   Surgeon: General Ary MD;  [facility];  Service: ENT    OH PERCUT IMPLNT [address] N/A 4/9/2021    Procedure: INSERTION CERVICAL DORSAL COLUMN SPINAL CORD STIMULATOR PERCUTANEOUS PERMANENT TRIAL;  Surgeon: Yony Santoyo MD;  [facility];  Service: Neurosurgery    OH TOTAL KNEE ARTHROPLASTY Left 5/18/2022    Procedure: KNEE TOTAL ARTHROPLASTY;  Surgeon: Elvin Engle DO;  Location: [facility];  Service: Orthopedics    SKIN BIOPSY  2015    R arm, scalp- all benign    SPINAL CORD STIMULATOR IMPLANT      lumbar  in place   [facility]      cervical    SPINAL CORD STIMULATOR REMOVAL N/A 4/16/2021    Procedure: REMOVAL OF SPINAL CORD STIMULATOR, OR REMOVAL OF EXTENSIONS AND CONNECTION TO [facility];  Surgeon: Yony Santoyo MD;  [facility];  Service: Neurosurgery    TONSILLECTOMY      TUBAL LIGATION         Family History   Problem Relation Age of Onset    Thyroid cancer Mother     Thyroid cancer Father     Alcohol abuse Sister     No Known Problems Brother     Cancer Family        Social History     Socioeconomic History    Marital status: /Civil Union     Spouse name: None    Number of children: None    Years of education: None    Highest education level: None   Occupational History    None   Tobacco Use    Smoking status: Current Every Day Smoker     Packs/day: 1 00     Years: 38 00     Pack years: 38 00    Smokeless tobacco: Never Used    Tobacco comment: encouraged smoking cessation   Vaping Use    Vaping Use: Never used   Substance and Sexual Activity    Alcohol use: No    Drug use: Yes     Frequency: 7 0 times per week     Types: Marijuana     Comment: CBD oil, THC for pain  Last used yesterday   Sexual activity: None   Other Topics Concern    None   Social History Narrative    None     Social Determinants of Health     Financial Resource Strain: Not on file   Food Insecurity: No Food Insecurity    Worried About Running Out of Food in the Last Year: Never true    Mague of Food in the Last Year: Never true   Transportation Needs: No Transportation Needs    Lack of Transportation (Medical): No    Lack of Transportation (Non-Medical):  No   Physical Activity: Not on file   Stress: Not on file   Social Connections: Not on file   Intimate Partner Violence: Not on file   Housing Stability: Low Risk     Unable to Pay for Housing in the Last Year: No    Number of Places Lived in the Last Year: 1    Unstable Housing in the Last Year: No         Current Outpatient Medications:     Capsaicin-Menthol (SALONPAS GEL-PATCH HOT EX), Apply 1 patch topically as needed, Disp: , Rfl:     Cholecalciferol (Vitamin D3) 125 MCG (5000 UT) CAPS, Take 1 capsule by mouth in the morning Prescribed by surgeon, Disp: , Rfl:     Citalopram Hydrobromide (CELEXA PO), Take 40 mg by mouth daily in the early morning  , Disp: , Rfl:     DULoxetine (CYMBALTA) 60 mg delayed release capsule, Take 60 mg by mouth 2 (two) times a day   , Disp: , Rfl:     fluticasone-vilanterol (Breo Ellipta) 100-25 mcg/inh inhaler, Inhale 1 puff as needed  , Disp: , Rfl:     gabapentin (NEURONTIN) 300 mg capsule, Take 900 mg by mouth see administration instructions 3 capsuless of 300mg = 900 mg TID , Disp: , Rfl:     lisinopril (ZESTRIL) 20 mg tablet, Take 20 mg by mouth 2 (two) times a day  , Disp: , Rfl:     Menthol, Topical Analgesic, (BIOFREEZE EX), Apply topically, Disp: , Rfl:     omeprazole (PriLOSEC) 40 MG capsule, Take 40 mg by mouth daily in the early morning  , Disp: , Rfl:     oxyCODONE-acetaminophen (PERCOCET) 7 5-325 MG per tablet, , Disp: , Rfl:     rOPINIRole (REQUIP) 2 mg tablet, Take 2 mg by mouth daily at bedtime  , Disp: , Rfl:     ascorbic acid (VITAMIN C) 500 MG tablet, Take 1 tablet (500 mg total) by mouth 2 (two) times a day, Disp: 60 tablet, Rfl: 1    ferrous sulfate 324 (65 Fe) mg, Take 1 tablet (324 mg total) by mouth 2 (two) times a day before meals (Patient not taking: Reported on 6/3/2022), Disp: 60 tablet, Rfl: 1    folic acid (FOLVITE) 1 mg tablet, Take 1 tablet (1 mg total) by mouth daily (Patient not taking: Reported on 6/3/2022), Disp: 30 tablet, Rfl: 1    fondaparinux (ARIXTRA) 2 5 mg/0 5 mL, Inject 2 5 mg under the skin every 24 hours for 14 days, Disp: 7 mL, Rfl: 0    Allergies   Allergen Reactions    Strawberry Extract - Food Allergy Anaphylaxis    Contrast [Iodinated Diagnostic Agents] Hives     CT scan dye       Review of Systems   Constitutional: Negative for fatigue  Gastrointestinal: Negative for nausea  Musculoskeletal:        As noted in HPI   Neurological: Negative for dizziness, weakness, numbness and headaches  All other systems reviewed and are negative         Objective:  BP 93/66   Pulse (!) 116   Ht 5' 2" (1 575 m)   BMI 32 92 kg/m²     Ortho Exam  Left knee -   Weight-bearing status: ***  Incision(s):  Clean, dry, edges well approximated sutures - sutures removed at time of visit without difficulty - no signs of drainage or dehiscence  Skin is warm and dry with no signs of erythema, ecchymosis, or infection  *** soft tissue swelling or effusion  ROM: ***  Strength: ***  Calf compartments are soft  - Maeve's sign  2+ TP and DP pulses with brisk capillary refill to the toes  Sural, saphenous, tibial, superficial and deep peroneal motor and sensory distributions intact  Sensation light touch intact distally    Physical Exam  Vitals and nursing note reviewed  Constitutional:       General: She is not in acute distress  Appearance: She is well-developed  HENT:      Head: Normocephalic and atraumatic  Eyes:      Conjunctiva/sclera: Conjunctivae normal    Cardiovascular:      Rate and Rhythm: Normal rate  Pulmonary:      Effort: Pulmonary effort is normal    Musculoskeletal:      Cervical back: Neck supple  Skin:     General: Skin is warm and dry  Capillary Refill: Capillary refill takes less than 2 seconds  Neurological:      Mental Status: She is alert and oriented to person, place, and time     Psychiatric:         Mood and Affect: Mood normal          Behavior: Behavior normal           Diagnostic Test Review:  Attending Physician has personally reviewed pertinent imaging in PACS, impression is as follows:    Review of radiographic series taken 6/3/2022 of the left knee shows well-seated total knee prosthesis with maintained alignment and no signs of loosening    Procedures   ***    Scribe Attestation    I,:   am acting as a scribe while in the presence of the attending physician :       I,:   personally performed the services described in this documentation    as scribed in my presence :

## 2022-06-06 ENCOUNTER — APPOINTMENT (OUTPATIENT)
Dept: PHYSICAL THERAPY | Facility: CLINIC | Age: 57
End: 2022-06-06
Payer: MEDICARE

## 2022-06-06 ENCOUNTER — TELEPHONE (OUTPATIENT)
Dept: OBGYN CLINIC | Facility: HOSPITAL | Age: 57
End: 2022-06-06

## 2022-06-06 NOTE — TELEPHONE ENCOUNTER
Patient called had a Left TKA by Dr Chirag Huggins on 5/18/22, she was in on 6/3 to have her stables removed  She has some concerns about her incision and the [employer]      CB# [phone]

## 2022-06-06 NOTE — TELEPHONE ENCOUNTER
Please clarify her question  After 5 days, the remaining Steri-Strips come off and she can lightly get it wet with soap

## 2022-06-07 ENCOUNTER — OFFICE VISIT (OUTPATIENT)
Dept: PHYSICAL THERAPY | Facility: CLINIC | Age: 57
End: 2022-06-07
Payer: MEDICARE

## 2022-06-07 DIAGNOSIS — M17.12 PRIMARY OSTEOARTHRITIS OF LEFT KNEE: Primary | ICD-10-CM

## 2022-06-07 DIAGNOSIS — M25.562 LEFT KNEE PAIN, UNSPECIFIED CHRONICITY: ICD-10-CM

## 2022-06-07 DIAGNOSIS — Z96.652 S/P TOTAL KNEE ARTHROPLASTY, LEFT: ICD-10-CM

## 2022-06-07 LAB
DME PARACHUTE DELIVERY DATE ACTUAL: NORMAL
DME PARACHUTE DELIVERY DATE REQUESTED: NORMAL
DME PARACHUTE ITEM DESCRIPTION: NORMAL
DME PARACHUTE ORDER STATUS: NORMAL
DME PARACHUTE SUPPLIER NAME: NORMAL
DME PARACHUTE SUPPLIER PHONE: NORMAL

## 2022-06-07 PROCEDURE — 97112 NEUROMUSCULAR REEDUCATION: CPT

## 2022-06-07 PROCEDURE — 97110 THERAPEUTIC EXERCISES: CPT

## 2022-06-07 PROCEDURE — 97530 THERAPEUTIC ACTIVITIES: CPT

## 2022-06-07 NOTE — PROGRESS NOTES
Daily Note     Today's date: 2022  Patient name: Agustin Gurrola  : 1965  MRN: [id number]  Referring provider: Ratna Shine  Dx:   Encounter Diagnosis     ICD-10-CM    1  Primary osteoarthritis of left knee  M17 12    2  Left knee pain, unspecified chronicity  M25 562    3  S/P total knee arthroplasty, left  [id number]        Start Time: 1102          Subjective: Patient states her left knee pain is 6/10  The right knee has been hurting from over use  Her right hand has been numb and she sees the MD tomorrow about it  She was outside fixing the yard for drainage yesterday  She was able to get down to the ground and up by herself  She took off all the steri strips from her incision since the MD office did not call her back  She has all the area (that she felt was open) covered in paper tape and she cleaned all the pussy areas  She feels she will take care of the incision herself  She reports she is now able to go up and down her 5" steps at home, step over step  She is now walking in home without assistive device  Objective: See treatment diary below    Patient continues not to heel strike well on effected leg  She walks with forward flexed posture with knees bent  Her left knee continues with left leg lag  Assessment: Tolerated treatment well  Patient would benefit from continued PT for stretching and strengthening  Patient was able to add exercises to her program with little difficulty and discomfort  She was challenged by the balance exercises  Patient is able to correct her posture a little with verbal cues  She felt ok leaving department  Plan: Continue per plan of care  Progress treatment as tolerated             Re-Evaluation:   PRECAUTIONS:SIGNIFICANT MEDICAL HX  Knee Specialty Daily Treatment Diary     Manual Therapy 6/7  6/1 6/3 6/6           Hamstring stretch        Prone Quad stretch        Patellar mobs        Knee stretch on edge of mat            Ther Ex  6/3 6/6   Nu Step S=7 L1 x11  L1 x6 min L1 x7 min L1 x8 min   Biodex Bike S=        Heel slides flex/abd   x10 ea     PROM knee        EXT BOARD        Prone knee flex        ANKLE PUMP   x20     SLR all planes   *2x5 w/ assist     Ball squeeze   :05x10     LAQ   :05x10     Hamstring stretch   :30x3     Calf stretch Wedge :30x3  Strap :30x3 Wedge :30x3    Standing hamstring curls   x10             Mini Squat *x2 min, find center x1 5 min       Balance  *L1-2       TKE *ball/ wall :05x10       Total gym squats (deep)        Neuro Re-ed        Tandem stand        GLUTEAL SET   :05x10     QS   Knee roll :05 x10     Wall slides with feet stagger   *nv *NV    Bridges   * :03 x10     Fitter board *taps/ balance   x1 min ea FWD/LAT       Eccentric Leg press        Clam Shells        QS+ SLR        Likely        GAIT Training        sidestepping 25ft x2   *// bars 10 ft x4    Heel-toe amb 25ft x2   *// bars 10 ft x4    Mirror walking   x5 min fatigued  Heel /toe,march RW mirror heel/toe, march, knee bend    Ther Act        Amb up/down steps        Chair squats        Crate carry        Step ups 6" x10 ea fwd/ lat   *4" x10 ea        Modalities        CP  KNEE                          Access Code: [email or web address]  URL: [email or web address]  Date: 06/01/2022  Prepared by: Mirtha Stuart    Exercises  · Seated March - 2 x daily - 7 x weekly - 1 sets - 10 reps - 3 sec hold  · Standing Knee Flexion AROM with Chair Support - 2 x daily - 7 x weekly - 1 sets - 10 reps - 3 sec hold  · Seated Gastroc Stretch with Strap - 2-3 x daily - 7 x weekly - 1 sets - 3 reps - 30 sec hold  · Seated Hamstring Stretch - 2-3 x daily - 7 x weekly - 1 sets - 3 reps - 30 sec hold  · Supine Hip Adduction Isometric with Ball - 2 x daily - 7 x weekly - 1 sets - 10 reps - 5 sec hold  · Supine Straight Leg Raises - 4 x daily - 7 x weekly - 1 sets - 5 reps  · Supine Bridge - 2 x daily - 7 x weekly - 1 sets - 10 reps - 3 sec hold      ·

## 2022-06-08 ENCOUNTER — OFFICE VISIT (OUTPATIENT)
Dept: PHYSICAL THERAPY | Facility: CLINIC | Age: 57
End: 2022-06-08
Payer: MEDICARE

## 2022-06-08 DIAGNOSIS — M17.12 PRIMARY OSTEOARTHRITIS OF LEFT KNEE: Primary | ICD-10-CM

## 2022-06-08 DIAGNOSIS — M25.562 LEFT KNEE PAIN, UNSPECIFIED CHRONICITY: ICD-10-CM

## 2022-06-08 DIAGNOSIS — Z96.652 S/P TOTAL KNEE ARTHROPLASTY, LEFT: ICD-10-CM

## 2022-06-08 PROCEDURE — 97530 THERAPEUTIC ACTIVITIES: CPT

## 2022-06-08 PROCEDURE — 97112 NEUROMUSCULAR REEDUCATION: CPT

## 2022-06-08 PROCEDURE — 97110 THERAPEUTIC EXERCISES: CPT

## 2022-06-08 NOTE — PROGRESS NOTES
Daily Note     Today's date: 2022  Patient name: Marylou Owusu  : 1965  MRN: [id number]  Referring provider: Shubham Gray  Dx:   Encounter Diagnosis     ICD-10-CM    1  Primary osteoarthritis of left knee  M17 12    2  Left knee pain, unspecified chronicity  M25 562    3  S/P total knee arthroplasty, left  T57 [id number]                   Subjective: Patient stated her knee is feeling ok  She's still having minimal discharge from the incision  Patient went to see ortho yesterday, incision was redressed  Objective: See treatment diary below      Assessment: Tolerated treatment well  Patient demonstrated good endurance throughout session, little to no rest required to complete TE  Good static balance, dynamic balance was more challenging  Good power and control noted with step ups  Minimal discharge noted along the incision, knee is warm to the touch, no signs of infection/red flags  Continued PT would be beneficial to improve function           Plan: Continue per plan of care             Re-Evaluation:   PRECAUTIONS:SIGNIFICANT MEDICAL HX  Knee Specialty Daily Treatment Diary     Manual Therapy 6/7 6/8 6/1 6/3 6/6           Hamstring stretch        Prone Quad stretch        Patellar mobs        Knee stretch on edge of mat            Ther Ex 6/7 6/8  6/3 6/6   Nu Step S=7 L1 x11 L1 10 min  L1 x7 min L1 x8 min   Biodex Bike S=        Heel slides flex/abd        PROM knee        EXT BOARD        Prone knee flex        ANKLE PUMP        SLR all planes        Ball squeeze        LAQ        Hamstring stretch        Calf stretch Wedge :30x3 Wedge :30x3  Wedge :30x3    Standing hamstring curls                Mini Squat *x2 min, find center x1 5 min x2 min, find center x1 5 min      Balance  *L1-2 L1-2      TKE *ball/ wall :05x10 ball/ wall :05x10      Total gym squats (deep)        Neuro Re-ed        Tandem stand  2x30" b/l      GLUTEAL SET        QS        Wall slides with feet stagger    *NV [address] board *taps/ balance   x1 min ea FWD/LAT taps/ balance   x1 min ea FWD/LAT      Eccentric Leg press        Clam Shells        QS+ SLR        Dar[place]  10x      GAIT Training        sidestepping 25ft x2 25ft x4  *// bars 10 ft x4    Heel-toe amb 25ft x2 25ft x4  *// bars 10 ft x4    Mirror walking    RW mirror heel/toe, march, knee bend    Ther Act        Amb up/down steps        Chair squats        Crate carry        Step ups 6" x10 ea fwd/ lat 6" x10 ea fwd/ lat  *4" x10 ea        Modalities        CP  KNEE                          Access Code: [email or web address]  URL: [email or web address]  Date: 06/01/2022  Prepared by: Eric Stuart    Exercises  · Seated March - 2 x daily - 7 x weekly - 1 sets - 10 reps - 3 sec hold  · Standing Knee Flexion AROM with Chair Support - 2 x daily - 7 x weekly - 1 sets - 10 reps - 3 sec hold  · Seated Gastroc Stretch with Strap - 2-3 x daily - 7 x weekly - 1 sets - 3 reps - 30 sec hold  · Seated Hamstring Stretch - 2-3 x daily - 7 x weekly - 1 sets - 3 reps - 30 sec hold  · Supine Hip Adduction Isometric with Ball - 2 x daily - 7 x weekly - 1 sets - 10 reps - 5 sec hold  · Supine Straight Leg Raises - 4 x daily - 7 x weekly - 1 sets - 5 reps  · Supine Bridge - 2 x daily - 7 x weekly - 1 sets - 10 reps - 3 sec hold      ·

## 2022-06-11 NOTE — PROGRESS NOTES
PT Re-Evaluation     Today's date: 2022  Patient name: Juan Carey  : 1965  MRN: [id number]  Referring provider: Sabine Hunt  Dx:   Encounter Diagnosis     ICD-10-CM    1  Primary osteoarthritis of left knee  M17 12    2  Left knee pain, unspecified chronicity  M25 562    3  S/P total knee arthroplasty, left  Z96 [id number]                   Assessment  Assessment details: Juan Carey is a 65 y o  female with a history of Covid-19, anxiety, arthritis, back pain, CKD, depression, dizziness, fibromyalgia, GERD, HTN, IBS, migraines, intermittent palpitations,RLS, allergies, SOB, sleep apnea, spinal stenosis, urinary incontinence, and BMI>30 that presents for a physical therapy RE evaluation  The patient has attended 6 sessions of physical therapy  The patient demonstrates signs and symptoms consistent with left knee OA; L knee pain s/p L TKA surgery  During the examination the patient demonstrated improved but decreased L LE strength, improved but decreased L knee ROM ( EXT), improved activity tolerance, improved but impaired gait, and decreased L knee pain  The patient has made functional gains since starting therapy  The patient is now able to walk community distances with a QC and stand for longer time periods  The patient continues to have difficulty with lifting/carrying heavy objects, squatting, kneeling, and walking prolonged distances on unlevel surfaces  The patient will benefit from continued skilled PT to address impairments, work towards goals, and restore patient PLOF      Impairments: abnormal gait, abnormal or restricted ROM, activity intolerance, impaired balance, impaired physical strength, lacks appropriate home exercise program, pain with function and weight-bearing intolerance  Functional limitations: difficulty with prolonged standing, prolonged walking, walking on unlevel surfaces, difficulty squatting/kneeling, difficulty stair-climbing, and difficulty transferring from low surfaces  Symptom irritability: lowBarriers to therapy: Medical Hx  Understanding of Dx/Px/POC: fair   Prognosis: fair  Prognosis details: Medical Hx    Goals    STG: Achieve in 4-6 weeks  1  Patient's L knee pain at worst less than 3/10 to allow for proper gait  PROGRESSING  2  Patient's L knee ROM improve by 20-75 degrees to improve squatting  PROGRESSING  3  L LE MMT improve to > 4+/5 all motions tested to improve ADL/recreational activities  PROGRESSING  4  Timed up and go score improve to less than 14 seconds to indicate improved balance/decreased falls risk  PROGRESSING    LTG:  Achieve in 6-12 weeks  1  Patient's knee FOTO score improve to > 55% to indicate a return to normal functioning  PROGRESSING  2  Patient achieve personal goal of walking without pain L knee  PROGRESSING  3  Patient to achieve independence with home exercise plan  PROGRESSING      Plan  Plan details: RE-ASSESS 1X/MONTH - patient will be traveling until mid July - would like to RE-EVAL once she returns to determine the need for additional services  Patient would benefit from: skilled physical therapy  Planned modality interventions: TENS, cryotherapy, thermotherapy: hydrocollator packs and electrical stimulation/Russian stimulation  Other planned modality interventions: IASTM/ NMES via e-stim w/ QS  Planned therapy interventions: joint mobilization, manual therapy, massage, neuromuscular re-education, patient education, postural training, self care, strengthening, stretching, therapeutic activities, therapeutic exercise, home exercise program, abdominal trunk stabilization, balance, balance/weight bearing training and gait training  Frequency: 1-3X/WK    Duration in weeks: 12  Plan of Care beginning date: 5/20/2022  Plan of Care expiration date: 8/19/2022  Treatment plan discussed with: PTA and patient        Subjective Evaluation    History of Present Illness  Date of surgery: 5/18/2022  Mechanism of injury: surgery  Mechanism of injury: SUBJECTIVE: 2022: The patient reports an improvement in  activity tolerance, L LE strength, and L knee ROM since starting therapy  The patient is now able to walk with a cane for community distances  The patient is able to stand of a shower, don/doff shoes and socks with less difficulty  The patient continues to have difficulty with persistent swelling and is using the commode at night due to L knee stiffness  The patient has difficulty with squatting, kneeling, prolonged walking, and lifting/carrying heavy objects  The patient will benefit from continued PT focused on achieving patient specific goals  INJURY HISTORY: Asha Hutchison is a 65 y o  female that presents to outpatient physical therapy with complaints of left knee pain and difficulty functioning s/p L TKA done by Dr Sean Tidwell at [facility]  The patient reports no complications but severe difficulty with standing, walking, ADL;s, and sleeping    The patient's main goal for physical therapy is to be able to walk on it again with the extreme pain        Pain  Current pain ratin  At best pain ratin  At worst pain ratin  Location: L knee medial>lateral joint line  Quality: tight, sharp and knife-like  Relieving factors: rest and medications  Aggravating factors: walking, stair climbing, lifting and standing  Progression: improved    Social Support  Steps to enter house: yes  3  Stairs in house: no   Lives in: [facility]  Lives with: spouse    Employment status: not working  Hand dominance: right    Treatments  Current treatment: physical therapy  Patient Goals  Patient goals for therapy: decreased pain, improved balance, increased motion, increased strength, independence with ADLs/IADLs, return to sport/leisure activities, decreased edema and return to work  Patient goal: walk without pain ( progressing)        Objective     Observations     Additional Observation Details  Patient's incisions are intact with (+) scabbing and mild redness at the edges  No drainage noted      Palpation   Left   Tenderness of the rectus femoris  Additional Palpation Details  No calf tenderness or skin change    Tenderness   Left Knee   Tenderness in the medial joint line  Neurological Testing     Sensation     Knee   Left Knee   Intact: light touch    Right Knee   Intact: light touch     Comments   Left light touch: numbness Lateral joint line knee  Active Range of Motion   Left Knee   Flexion: 110 degrees with pain  Extension: -15 degrees     Right Knee   Flexion: 120 degrees   Extension: -10 degrees     Additional Active Range of Motion Details  IMPROVED    Strength/Myotome Testing     Left Hip   Planes of Motion   Flexion: 3+  Extension: 3+  Abduction: 3+  Adduction: 3+    Right Hip   Planes of Motion   Flexion: 5  Extension: 5  Abduction: 5  Adduction: 5    Left Knee   Flexion: 4-  Extension: 3+  Quadriceps contraction: fair    Right Knee   Flexion: 5  Extension: 5  Quadriceps contraction: good    Additional Strength Details  IMPROVED  IMPROVED    Tests     Additional Tests Details  Gait impairments: Patient ambulates with QC WBAT B/L LE    The patient demonstrates improved gait speed, flexed trunk, unequal step lengths, decreased L knee extension, and decreased heel strike L LE     TU seconds w/ RW  FOTO: 54% ( predicted 31%)                     Re-Evaluation:   PRECAUTIONS:SIGNIFICANT MEDICAL HX  Knee Specialty Daily Treatment Diary   Manual Therapy    Knee EXT stretch   3x:30     Hamstring stretch   3x:30     Prone Quad stretch        Patellar mobs   x10 instructed for home     Calf stretch   3x:10         Ther Ex    Nu Step S=7 L1 x11 L1 10 min L3 x 10 mins  L1 x8 min   Biodex Bike S=        Heel slides flex/abd        PROM knee        EXT BOARD        Prone knee flex        ANKLE PUMP        SLR all planes        Ball squeeze        LAQ   2# x20     Hamstring stretch        Calf stretch Wedge :30x3 Wedge :30x3 reviewed     Standing hamstring curls        1/4 wall squat   x15     Mini Squat CSMi *x2 min, find center x1 5 min x2 min, find center x1 5 min      Balance  *L1-2 L1-2      TKE *ball/ wall :05x10 ball/ wall :05x10      Total gym squats (deep)        Neuro Re-ed        Tandem stand  2x30" b/l 2x:30 B/L     GLUTEAL SET        QS        Wall slides with feet stagger        [employer] board *taps/ balance   x1 min ea FWD/LAT taps/ balance   x1 min ea FWD/LAT Taps x10     Eccentric Leg press        Clam Shells        QS+ SLR        [place]  10x      GAIT Training        sidestepping 25ft x2 25ft x4 15x2 // bar ( NRE)     Heel-toe amb 25ft x2 25ft x4 15x2// bar ( NRE)     Mirror walking        Ther Act        Amb up/down steps        Chair squats        Crate carry        Step ups 6" x10 ea fwd/ lat 6" x10 ea fwd/ lat 6" x10 ea         Modalities        CP  KNEE                          Access Code: [email or web address]  Updated AD

## 2022-06-13 ENCOUNTER — EVALUATION (OUTPATIENT)
Dept: PHYSICAL THERAPY | Facility: CLINIC | Age: 57
End: 2022-06-13
Payer: MEDICARE

## 2022-06-13 DIAGNOSIS — Z96.652 S/P TOTAL KNEE ARTHROPLASTY, LEFT: ICD-10-CM

## 2022-06-13 DIAGNOSIS — M25.562 LEFT KNEE PAIN, UNSPECIFIED CHRONICITY: ICD-10-CM

## 2022-06-13 DIAGNOSIS — M17.12 PRIMARY OSTEOARTHRITIS OF LEFT KNEE: Primary | ICD-10-CM

## 2022-06-13 PROCEDURE — 97112 NEUROMUSCULAR REEDUCATION: CPT | Performed by: PHYSICAL THERAPIST

## 2022-06-13 PROCEDURE — 97110 THERAPEUTIC EXERCISES: CPT | Performed by: PHYSICAL THERAPIST

## 2022-06-13 PROCEDURE — 97140 MANUAL THERAPY 1/> REGIONS: CPT | Performed by: PHYSICAL THERAPIST

## 2022-06-15 ENCOUNTER — OFFICE VISIT (OUTPATIENT)
Dept: PHYSICAL THERAPY | Facility: CLINIC | Age: 57
End: 2022-06-15
Payer: MEDICARE

## 2022-06-15 DIAGNOSIS — Z96.652 S/P TOTAL KNEE ARTHROPLASTY, LEFT: ICD-10-CM

## 2022-06-15 DIAGNOSIS — M25.562 LEFT KNEE PAIN, UNSPECIFIED CHRONICITY: ICD-10-CM

## 2022-06-15 DIAGNOSIS — M17.12 PRIMARY OSTEOARTHRITIS OF LEFT KNEE: Primary | ICD-10-CM

## 2022-06-15 PROCEDURE — 97110 THERAPEUTIC EXERCISES: CPT

## 2022-06-15 PROCEDURE — 97140 MANUAL THERAPY 1/> REGIONS: CPT

## 2022-06-15 PROCEDURE — 97112 NEUROMUSCULAR REEDUCATION: CPT

## 2022-07-18 NOTE — PROGRESS NOTES
PT Re-Evaluation     Today's date: 2022  Patient name: Carlotta Martinez  : 1965  MRN: [id number]  Referring provider: Lakhwinder Gay  Dx:   Encounter Diagnosis     ICD-10-CM    1  Primary osteoarthritis of left knee  M17 12    2  Left knee pain, unspecified chronicity  M25 562    3  S/P TKR (total knee replacement), left  Z96 [id number]                   Assessment  Assessment details: Carlotta Martinez is a 65 y o  female with a history of Covid-19, anxiety, arthritis, back pain, CKD, depression, dizziness, fibromyalgia, GERD, HTN, IBS, migraines, intermittent palpitations,RLS, allergies, SOB, sleep apnea, spinal stenosis, urinary incontinence, and BMI>30 that presents for a physical therapy RE evaluation  The patient has attended 8 sessions of physical therapy - last appointment on 6/15/2022 due to patient traveling  The patient demonstrates signs and symptoms consistent with left knee OA; L knee pain s/p L TKA surgery  During the examination the patient demonstrated improved but decreased L LE strength, improved but decreased L knee ROM ( EXT), improved activity tolerance, improved but impaired gait, and decreased L knee pain  The patient has made functional gains since last therapy assessment  The patient is now able to walk community distances with a QC and stand for longer time periods  The patient continues to have difficulty with lifting/carrying heavy objects, squatting, kneeling, and walking prolonged distances on unlevel surfaces  The patient will benefit from continued skilled PT to address impairments, work towards goals, and restore patient PLOF      Impairments: abnormal gait, abnormal or restricted ROM, activity intolerance, impaired balance, impaired physical strength, lacks appropriate home exercise program, pain with function and weight-bearing intolerance  Functional limitations: difficulty with prolonged standing, prolonged walking, walking on unlevel surfaces, difficulty squatting/kneeling, difficulty stair-climbing, and difficulty transferring from low surfaces  Symptom irritability: lowBarriers to therapy: Medical Hx  Understanding of Dx/Px/POC: fair   Prognosis: fair  Prognosis details: Medical Hx    Goals    STG: Achieve in 4-6 weeks  1  Patient's L knee pain at worst less than 3/10 to allow for proper gait  PROGRESSING  2  Patient's L knee ROM improve by 20-75 degrees to improve squatting  PROGRESSING  3  L LE MMT improve to > 4+/5 all motions tested to improve ADL/recreational activities  PROGRESSING  4  Timed up and go score improve to less than 14 seconds to indicate improved balance/decreased falls risk  PROGRESSING    LTG:  Achieve in 6-12 weeks  1  Patient's knee FOTO score improve to > 55% to indicate a return to normal functioning  PROGRESSING  2  Patient achieve personal goal of walking without pain L knee  PROGRESSING  3  Patient to achieve independence with home exercise plan  PROGRESSING      Plan  Plan details: RE-ASSESS 1X/MONTH - patient will be traveling until mid July - would like to RE-EVAL once she returns to determine the need for additional services  Patient would benefit from: skilled physical therapy  Planned modality interventions: TENS, cryotherapy, thermotherapy: hydrocollator packs and electrical stimulation/Russian stimulation  Other planned modality interventions: IASTM/ NMES via e-stim w/ QS  Planned therapy interventions: joint mobilization, manual therapy, massage, neuromuscular re-education, patient education, postural training, self care, strengthening, stretching, therapeutic activities, therapeutic exercise, home exercise program, abdominal trunk stabilization, balance, balance/weight bearing training and gait training  Frequency: 1-3X/WK    Duration in weeks: 12  Plan of Care beginning date: 5/20/2022  Plan of Care expiration date: 8/19/2022  Treatment plan discussed with: PTA and patient        Subjective Evaluation    History of Present Illness  Date of surgery: 2022  Mechanism of injury: surgery  Mechanism of injury: SUBJECTIVE: 2022: The patient has not attended therapy since 6/15/2022 due to traveling out of the area  The patient reports ***  The patient will benefit from continued PT to achieve her goals of therapy  SUBJECTIVE: 2022: The patient reports an improvement in  activity tolerance, L LE strength, and L knee ROM since starting therapy  The patient is now able to walk with a cane for community distances  The patient is able to stand of a shower, don/doff shoes and socks with less difficulty  The patient continues to have difficulty with persistent swelling and is using the commode at night due to L knee stiffness  The patient has difficulty with squatting, kneeling, prolonged walking, and lifting/carrying heavy objects  The patient will benefit from continued PT focused on achieving patient specific goals  INJURY HISTORY: Cesar Hope is a 65 y o  female that presents to outpatient physical therapy with complaints of left knee pain and difficulty functioning s/p L TKA done by Dr Sushma Adamson at [facility]  The patient reports no complications but severe difficulty with standing, walking, ADL;s, and sleeping    The patient's main goal for physical therapy is to be able to walk on it again with the extreme pain        Pain  Current pain ratin  At best pain ratin  At worst pain ratin  Location: L knee medial>lateral joint line  Quality: tight, sharp and knife-like  Relieving factors: rest and medications  Aggravating factors: walking, stair climbing, lifting and standing  Progression: improved    Social Support  Steps to enter house: yes  3  Stairs in house: no   Lives in: [facility]  Lives with: spouse    Employment status: not working  Hand dominance: right    Treatments  Current treatment: physical therapy  Patient Goals  Patient goals for therapy: decreased pain, improved balance, increased motion, increased strength, independence with ADLs/IADLs, return to sport/leisure activities, decreased edema and return to work  Patient goal: walk without pain ( progressing)        Objective     Observations     Additional Observation Details  Patient's incisions are intact with (+) scabbing and mild redness at the edges  No drainage noted      Palpation   Left   Tenderness of the rectus femoris  Additional Palpation Details  No calf tenderness or skin change    Tenderness   Left Knee   Tenderness in the medial joint line  Neurological Testing     Sensation     Knee   Left Knee   Intact: light touch    Right Knee   Intact: light touch     Comments   Left light touch: numbness Lateral joint line knee  Active Range of Motion   Left Knee   Flexion: 110 degrees with pain  Extension: -15 degrees     Right Knee   Flexion: 120 degrees   Extension: -10 degrees     Additional Active Range of Motion Details  IMPROVED    Strength/Myotome Testing     Left Hip   Planes of Motion   Flexion: 3+  Extension: 3+  Abduction: 3+  Adduction: 3+    Right Hip   Planes of Motion   Flexion: 5  Extension: 5  Abduction: 5  Adduction: 5    Left Knee   Flexion: 4-  Extension: 3+  Quadriceps contraction: fair    Right Knee   Flexion: 5  Extension: 5  Quadriceps contraction: good    Additional Strength Details  IMPROVED  IMPROVED    Tests     Additional Tests Details  Gait impairments: Patient ambulates with QC WBAT B/L LE    The patient demonstrates improved gait speed, flexed trunk, unequal step lengths, decreased L knee extension, and decreased heel strike L LE     TU seconds w/ RW  FOTO: 54% ( predicted 31%)                     Re-Evaluation:   PRECAUTIONS:SIGNIFICANT MEDICAL HX  Knee Specialty Daily Treatment Diary   Manual Therapy 6/7 6/8 6/13 6/15    Knee EXT stretch   3x:30 3x :30    Hamstring stretch   3x:30 3x :30    Prone Quad stretch        Patellar mobs   x10 instructed for home     Calf stretch   3x:10 3x:10        Ther Ex 6/7 6/8 6/13 6/15    Nu Step S=7 L1 x11 L1 10 min L3 x 10 mins L3 x10 min    Biodex Bike S=        Heel slides flex/abd        PROM knee        EXT BOARD        Prone knee flex        ANKLE PUMP        SLR all planes        Ball squeeze        LAQ   2# x20 2# x20    Hamstring stretch        Calf stretch Wedge :30x3 Wedge :30x3 reviewed Wedge :30x3    Standing hamstring curls        1/4 wall squat   x15 x15    Mini Squat CSMi *x2 min, find center x1 5 min x2 min, find center x1 5 min      Balance  *L1-2 L1-2      TKE *ball/ wall :05x10 ball/ wall :05x10      Total gym squats (deep)        Neuro Re-ed        Tandem stand  2x30" b/l 2x:30 B/L 2x :30 B/L    GLUTEAL SET        QS        Wall slides with feet stagger        [employer] board *taps/ balance   x1 min ea FWD/LAT taps/ balance   x1 min ea FWD/LAT Taps x10 Taps x10    Eccentric Leg press        Clam Shells        QS+ SLR        [place]  10x      GAIT Training        sidestepping 25ft x2 25ft x4 15x2 // bar ( NRE) // bar x4    Heel-toe amb 25ft x2 25ft x4 15x2// bar ( NRE) // bars x4    Mirror walking        Ther Act        Amb up/down steps        Chair squats        Crate carry        Step ups 6" x10 ea fwd/ lat 6" x10 ea fwd/ lat 6" x10 ea 6" x10 ea        Modalities        CP  KNEE                            Access Code: [email or web address]  Updated AD

## 2022-07-19 ENCOUNTER — APPOINTMENT (OUTPATIENT)
Dept: PHYSICAL THERAPY | Facility: CLINIC | Age: 57
End: 2022-07-19
Payer: MEDICARE

## 2022-07-21 ENCOUNTER — APPOINTMENT (OUTPATIENT)
Dept: PHYSICAL THERAPY | Facility: CLINIC | Age: 57
End: 2022-07-21
Payer: MEDICARE

## 2022-07-22 ENCOUNTER — OFFICE VISIT (OUTPATIENT)
Dept: OBGYN CLINIC | Facility: CLINIC | Age: 57
End: 2022-07-22

## 2022-07-22 VITALS
WEIGHT: 175 LBS | BODY MASS INDEX: 32.2 KG/M2 | HEART RATE: 76 BPM | SYSTOLIC BLOOD PRESSURE: 141 MMHG | HEIGHT: 62 IN | DIASTOLIC BLOOD PRESSURE: 99 MMHG

## 2022-07-22 DIAGNOSIS — Z96.652 S/P TOTAL KNEE ARTHROPLASTY, LEFT: Primary | ICD-10-CM

## 2022-07-22 PROCEDURE — 99024 POSTOP FOLLOW-UP VISIT: CPT | Performed by: ORTHOPAEDIC SURGERY

## 2022-07-22 NOTE — PROGRESS NOTES
Patient Name:  Christy Joshua  MRN:  [id number]    [address] [place] [address]     [facility]prox  2 months s/p left total knee arthroplasty   1  Overall María Meadows is doing well  2  Knee ROM/strength is full, incision is well healed  3  Continue PT and HEP   4  Activities to tolerance, no restrictions  5  Follow up in 6 weeks time with left knee x-rays and a ROM check     The patient is doing quite well from her left total knee replacement  Physical exam is flexion past 120°  No ligament dysfunction  Incision clean and dry  Continue therapy to she plateaus  Return back in 6 weeks re-evaluation with new x-rays of left knee-three views  If her condition changes, she will not hesitate let us know    History of the Present Illness     65 y o  female presents to the office today for a follow up regarding her left knee  She is aprox  2 months s/p left total knee arthroplasty, DOS 5/18/22  Overall María Meadows is doing well  She recently took a trip to [place]  She does not intermittent swelling about the left knee, specifically with flying  She denies any knee pain today  She starts back up in PT next week  Physical Exam     /99   Pulse 76   Ht 5' 2" (1 575 m)   Wt 79 4 kg (175 lb)   BMI 32 01 kg/m²     Left knee:   No erythema, ecchymosis or significant edema  Well healed surgical incision   No effusion noted   Knee ROM 0-115 degrees  Non tender to palpation   Ambulates without assistance  Extremity appears warm and well perfused  Neurovascularly intact        Data Review     I have personally reviewed pertinent films in PACS, and my interpretation follows      No new imaging to review     Procedures    Scribe Attestation    I,:  Agata Lauren am acting as a scribe while in the presence of the attending physician :       I,:  Gordon iSngh, DO personally performed the services described in this documentation    as scribed in my presence :

## 2022-07-25 NOTE — PROGRESS NOTES
PT Re-Evaluation  and PT Discharge    Today's date: 2022  Patient name: Maliha Obrien  : 1965  MRN: [id number]  Referring provider: Leila Gonsalez  Dx:   Encounter Diagnosis     ICD-10-CM    1  Primary osteoarthritis of left knee  M17 12    2  Left knee pain, unspecified chronicity  M25 562    3  S/P TKR (total knee replacement), left  [id number]                   Assessment  Assessment details: Maliha Obrien is a 65 y o  female with a history of Covid-19, anxiety, arthritis, back pain, CKD, depression, dizziness, fibromyalgia, GERD, HTN, IBS, migraines, intermittent palpitations,RLS, allergies, SOB, sleep apnea, spinal stenosis, urinary incontinence, and BMI>30 that presents for a physical therapy RE evaluation/DISCHARGE  The patient has attended 8 sessions of physical therapy - last appointment on 6/15/2022 due to patient traveling  The patient demonstrates signs and symptoms consistent with left knee OA; L knee pain s/p L TKA surgery  During the examination the patient demonstrated improved  L LE strength, improved but slightly decreased L knee ROM ( EXT), improved activity tolerance, improved gait, and decreased L knee pain  The patient has made functional gains since last therapy assessment  The patient is now able to walk community distances without any assistive devices and stand for longer time periods  The patient has achieved most of her goals of therapy  The patient will be discharged from formal PT to an independent HEP  Symptom irritability: lowBarriers to therapy: Medical Hx  Understanding of Dx/Px/POC: fair   Prognosis: fair  Prognosis details: Medical Hx    Goals    STG: Achieve in 4-6 weeks  1  Patient's L knee pain at worst less than 3/10 to allow for proper gait  PARTIALLY MET  2  Patient's L knee ROM improve by 20-75 degrees to improve squatting  MET  3   L LE MMT improve to > 4+/5 all motions tested to improve ADL/recreational activities  PARTIALLY MET  4    Timed up and go score improve to less than 14 seconds to indicate improved balance/decreased falls risk  PARTIALLY MET    LTG:  Achieve in 6-12 weeks  1  Patient's knee FOTO score improve to > 55% to indicate a return to normal functioning  MET  2  Patient achieve personal goal of walking without pain L knee  PARTIALLY MET  3  Patient to achieve independence with home exercise plan  MET      Plan  Plan details: D/C PT TO AN INDEPENDENT HEP  Treatment plan discussed with: PTA and patient        Subjective Evaluation    History of Present Illness  Date of surgery: 5/18/2022  Mechanism of injury: surgery  Mechanism of injury: SUBJECTIVE: 7/26/2022: The patient has not attended therapy since 6/15/2022 due to traveling out of the area  The patient reports improvement at her L knee since last assessment  The patient is able to perform yard work without difficulty  The patient will be discharged from formal PT to an independent [facility]      SUBJECTIVE: 6/13/2022: The patient reports an improvement in  activity tolerance, L LE strength, and L knee ROM since starting therapy  The patient is now able to walk with a cane for community distances  The patient is able to stand of a shower, don/doff shoes and socks with less difficulty  The patient continues to have difficulty with persistent swelling and is using the commode at night due to L knee stiffness  The patient has difficulty with squatting, kneeling, prolonged walking, and lifting/carrying heavy objects  The patient will benefit from continued PT focused on achieving patient specific goals  INJURY HISTORY: Petra Grajeda is a 65 y o  female that presents to outpatient physical therapy with complaints of left knee pain and difficulty functioning s/p L TKA done by Dr Alfred Graham at [facility]  The patient reports no complications but severe difficulty with standing, walking, ADL;s, and sleeping    The patient's main goal for physical therapy is to be able to walk on it again with the extreme pain  Pain  Current pain rating: 3  At best pain rating: 3  At worst pain ratin  Location: L knee  Quality: tight, sharp and knife-like  Relieving factors: rest and medications  Aggravating factors: stair climbing and lifting  Progression: improved    Social Support  Steps to enter house: yes  3  Stairs in house: no   Lives in: [facility]  Lives with: spouse    Employment status: not working  Hand dominance: right    Treatments  Previous treatment: physical therapy  Patient Goals  Patient goal: walk without pain ( MET)        Objective     Observations     Additional Observation Details  Patient's L knee incisions are healed      Palpation     Additional Palpation Details  No calf tenderness or skin change    Tenderness   Left Knee   No tenderness in the medial joint line  Neurological Testing     Sensation     Knee   Left Knee   Intact: light touch  Diminished: light touch     Right Knee   Intact: light touch     Comments   Left light touch: numbness Lateral joint line knee  Active Range of Motion   Left Knee   Flexion: 130 degrees   Extension: -7 degrees     Right Knee   Flexion: 120 degrees   Extension: -10 degrees     Additional Active Range of Motion Details  IMPROVED    Strength/Myotome Testing     Left Hip   Planes of Motion   Flexion: 4+  Extension: 4+  Abduction: 4+  Adduction: 4+    Right Hip   Planes of Motion   Flexion: 5  Extension: 5  Abduction: 5  Adduction: 5    Left Knee   Flexion: 4+  Extension: 4+  Quadriceps contraction: fair    Right Knee   Flexion: 5  Extension: 5  Quadriceps contraction: good    Additional Strength Details  IMPROVED  IMPROVED    Tests     Additional Tests Details  Gait impairments: Patient ambulates with NO AD WBAT B/L LE    The patient demonstrates improved gait speed, slight flexed trunk, equal step lengths, slight decreased L knee extension, and improved heel strike L LE     TU seconds no AD  FOTO: 75% ( predicted 31%)                     Re-Evaluation: 8/23  PRECAUTIONS:SIGNIFICANT MEDICAL HX  Knee Specialty Daily Treatment Diary   Manual Therapy 6/7 6/8 6/13 6/15 7/26   Knee EXT stretch   3x:30 3x :30 3x:30   Hamstring stretch   3x:30 3x :30 3x:30   Prone Quad stretch     reviewed   Patellar mobs   x10 instructed for home  x10   Calf stretch   3x:10 3x:10        Ther Ex 6/7 6/8 6/13 6/15 7/26   Nu Step S=7 L1 x11 L1 10 min L3 x 10 mins L3 x10 min L3 x10   Biodex Bike S=        Heel slides flex/abd        PROM knee        Prone props     4x:30   Prone knee flex     4x:30   ANKLE PUMP        SLR all planes        Ball squeeze        LAQ   2# x20 2# x20    Hamstring stretch        Calf stretch Wedge :30x3 Wedge :30x3 reviewed Wedge :30x3    Standing hamstring curls        1/4 wall squat   x15 x15    Mini Squat CSMi *x2 min, find center x1 5 min x2 min, find center x1 5 min      Balance  *L1-2 L1-2      TKE *ball/ wall :05x10 ball/ wall :05x10      Total gym squats (deep)        Neuro Re-ed        Tandem stand  2x30" b/l 2x:30 B/L 2x :30 B/L 2X:30 B/L   GLUTEAL SET        QS        Wall slides with feet stagger        [employer] board *taps/ balance   x1 min ea FWD/LAT taps/ balance   x1 min ea FWD/LAT Taps x10 Taps x10    Eccentric Leg press        Clam Shells        QS+ SLR        [place]  10x              sidestepping 25ft x2 25ft x4 15x2 // bar ( NRE) // bar x4 X4   Heel-toe amb 25ft x2 25ft x4 15x2// bar ( NRE) // bars x4 X4   Mirror walking        Ther Act        Amb up/down steps        Chair squats        Crate carry        Step ups 6" x10 ea fwd/ lat 6" x10 ea fwd/ lat 6" x10 ea 6" x10 ea        Modalities        CP  KNEE                            Access Code: [email or web address]  Updated AD 7/26

## 2022-07-26 ENCOUNTER — EVALUATION (OUTPATIENT)
Dept: PHYSICAL THERAPY | Facility: CLINIC | Age: 57
End: 2022-07-26
Payer: MEDICARE

## 2022-07-26 DIAGNOSIS — M17.12 PRIMARY OSTEOARTHRITIS OF LEFT KNEE: Primary | ICD-10-CM

## 2022-07-26 DIAGNOSIS — Z96.652 S/P TKR (TOTAL KNEE REPLACEMENT), LEFT: ICD-10-CM

## 2022-07-26 DIAGNOSIS — M25.562 LEFT KNEE PAIN, UNSPECIFIED CHRONICITY: ICD-10-CM

## 2022-07-26 PROCEDURE — 97112 NEUROMUSCULAR REEDUCATION: CPT | Performed by: PHYSICAL THERAPIST

## 2022-07-26 PROCEDURE — 97110 THERAPEUTIC EXERCISES: CPT | Performed by: PHYSICAL THERAPIST

## 2022-07-26 PROCEDURE — 97140 MANUAL THERAPY 1/> REGIONS: CPT | Performed by: PHYSICAL THERAPIST

## 2022-07-27 ENCOUNTER — TELEPHONE (OUTPATIENT)
Dept: NEUROSURGERY | Facility: CLINIC | Age: 57
End: 2022-07-27

## 2022-07-27 DIAGNOSIS — G89.4 CHRONIC PAIN SYNDROME: Primary | ICD-10-CM

## 2022-07-27 NOTE — TELEPHONE ENCOUNTER
Received a call from a patient looking for a referral to see PM within [facility]  Returned call to patient and advised this RN will place a referral for patient to see PM      Patient also questioned if nsx has a surgeon for carpal tunnel  Advised there is 1 neurosurgeon affiliated with our practice however she would need an intake as this will be a new problem  Informed the intake specialists to reach out to patient to help get her scheduled  Patient was appreciative of the call back

## 2022-07-27 NOTE — TELEPHONE ENCOUNTER
PATIENT WAS LAST SEEN BY SUZY/ADITI ON 3/28/22 FOR CSPINE  EMG 2 LIMB UPPER EXTREMITY 3/24/22 SL (BOOKMARK)    PATIENT IS LOOKING TO BE SEE FOR CARPAL TUNNEL SYNDROME TO DISCUSS SX     I CALLED THIS PATIENT [phone] AND SHE REPORTS LEFT>RIGHT WRIST PAIN GOING TO THE ARM WITH ASSOCIATED NUMBNESS AND TINGLING; BURNING OF MIDDLE FINGER, RING FINGER, AND PINKY  SHE HAS POOR GRASP  SHE HAS LOST MUSCLE TONE  REVIEWED IN PERSON WITH DR Damien Elizabeth AND HE WILL BE ABLE TO PERFORM CTR FOR THE PATIENT  SCHEDULED WITH PATIENT FOR 8/23/22 3:15PM ADITI JOSEPH TO DISCUSS

## 2022-08-11 NOTE — TELEPHONE ENCOUNTER
Received a call from Urmila requesting call back  Unsure of the reason for her call  She did not answer  Left message to call back to discuss

## 2022-08-23 ENCOUNTER — OFFICE VISIT (OUTPATIENT)
Dept: NEUROSURGERY | Facility: CLINIC | Age: 57
End: 2022-08-23
Payer: MEDICARE

## 2022-08-23 VITALS
HEIGHT: 62 IN | HEART RATE: 101 BPM | BODY MASS INDEX: 32.2 KG/M2 | WEIGHT: 175 LBS | RESPIRATION RATE: 16 BRPM | DIASTOLIC BLOOD PRESSURE: 78 MMHG | TEMPERATURE: 99.1 F | SYSTOLIC BLOOD PRESSURE: 128 MMHG

## 2022-08-23 DIAGNOSIS — G56.03 CARPAL TUNNEL SYNDROME, BILATERAL: ICD-10-CM

## 2022-08-23 DIAGNOSIS — M62.81 MUSCLE WEAKNESS OF LEFT UPPER EXTREMITY: Primary | ICD-10-CM

## 2022-08-23 DIAGNOSIS — G56.22 ULNAR NEUROPATHY AT ELBOW, LEFT: ICD-10-CM

## 2022-08-23 PROCEDURE — 99213 OFFICE O/P EST LOW 20 MIN: CPT | Performed by: STUDENT IN AN ORGANIZED HEALTH CARE EDUCATION/TRAINING PROGRAM

## 2022-08-23 RX ORDER — DIVALPROEX SODIUM 250 MG/1
250 TABLET, EXTENDED RELEASE ORAL DAILY
COMMUNITY
Start: 2022-08-16 | End: 2023-08-16

## 2022-08-23 NOTE — PROGRESS NOTES
Office Note - Neurosurgery   Emily Flores 65 y o  female MRN: [id number]      Assessment and Plan: This is a 71-year-old female with extensive cervical and lumbar spine surgical history status post cervical and thoracic spinal cord stimulator placement presenting for evaluation for carpal tunnel syndrome and ulnar neuropathy  MRI of her cervical spine demonstrates postsurgical changes from C4-C7  There does not appear to be any overt canal stenosis  The patient does have T2 hyperintensities within her cord, likely injury from her prior spinal issues  EMG of her bilateral upper extremities demonstrate moderate to severe carpal tunnel and left hand and impingement of the ulnar nerve at the wrist   I had a long discussion with the patient about her symptoms as well as imaging and EMG findings  The patient has signs of carpal tunnel syndrome as well as ulnar neuropathy stemming at the elbow  She has positive Tinel's sign and Phalen's sign  She also has some atrophy of the intrinsic muscles in the left hand  She also has bilateral Champion's and It is unclear if the atrophy is from her prior surgery or due to the ulnar neuropathy  The patient has very poor sleep hygiene and sleeps with her hand bent to support her head  I believe this could be contributing to why her carpal tunnel is not improving  We discussed bracing when she went to bed and also avoiding actions that post stress on her ulnar nerve  I will also be given the patient a referral to physical therapy for hand strengthening exercises  I will see the patient on a p r n  basis  She is to return if her symptoms do not improve  History, physical examination and diagnostic tests were reviewed and questions answered  Diagnosis, care plan and treatment options were discussed  The patient and spouse/SO understand instructions and will follow up as directed  Follow-up:  P r n      Diagnoses and all orders for this visit:    Muscle weakness of left upper extremity  -     Ambulatory Referral to Physical Therapy; Future    Ulnar neuropathy at elbow, left    Carpal tunnel syndrome, bilateral    Other orders  -     divalproex sodium (DEPAKOTE ER) 250 mg 24 hr tablet; Take 250 mg by mouth daily        Subjective/Objective     Chief Complaint    Carpal tunnel syndrome    HPI    This is a 50-year-old female with significant spinal surgery history including cervical and lumbar spine surgeries and cervical and thoracic spinal cord stimulator placement presenting for evaluation for carpal tunnel syndrome and ulnar neuropathy  The patient states after her cervical spine surgeries she had residual weakness in her upper extremities as well as numbness and tingling in her fingers  A few months ago she reports having sharp pain that radiates into the last 3 digits of her left fingers  She reports it is burning in nature and is associated with numbness and tingling  She states her left hand muscles have also significantly atrophied over time  She states the pain comes on when she is performing most of her usual activities  At night when she goes to sleep the pain then radiates into the 1st 2 digits of her left hand  She states she has symptoms on the right side however not as severe as the left  ROS  ROS personally reviewed and updated  Review of Systems   Constitutional: Positive for fatigue (chronic)  HENT: Positive for trouble swallowing  Eyes: Negative  Respiratory: Positive for shortness of breath  Cardiovascular: Negative  Gastrointestinal: Positive for diarrhea  Endocrine: Positive for cold intolerance and heat intolerance  Cold sensation toes  Feels hot all the time   Genitourinary: Positive for difficulty urinating, frequency and urgency          Incontinence and leakage, especially when coughing/sneezing  Wetting bed at night   Musculoskeletal: Positive for arthralgias (Pain into the hips, buttocks and legs ), back pain (constant across low back pain, worsening), gait problem, myalgias and neck pain ("burning" in neck  radiates to arms and hands)  Under breast down to feet, extreme pain/tightness   Skin: Negative  Negative for wound  Allergic/Immunologic: Positive for food allergies (strawberry/strawberry extract)  Neurological: Positive for weakness (All extremities, left hand worsening ), numbness (All extremities, and body  N/T left wrist, also burning/stabbing pain ) and headaches (off and on)  Paresthesias of hands  Unsteady balance  Complains of inability to use L>R hands  Hematological: Negative  Psychiatric/Behavioral: Positive for decreased concentration, dysphoric mood and sleep disturbance (due to pain)  The patient is nervous/anxious          Family History    Family History   Problem Relation Age of Onset    Thyroid cancer Mother     Thyroid cancer Father     Alcohol abuse Sister     No Known Problems Brother     Cancer Family        Social History    Social History     Socioeconomic History    Marital status: /Civil Union     Spouse name: Not on file    Number of children: Not on file    Years of education: Not on file    Highest education level: Not on file   Occupational History    Not on file   Tobacco Use    Smoking status: Current Every Day Smoker     Packs/day: 1 50     Years: 38 00     Pack years: 57 00    Smokeless tobacco: Never Used    Tobacco comment: encouraged smoking cessation   Vaping Use    Vaping Use: Never used   Substance and Sexual Activity    Alcohol use: No    Drug use: Yes     Frequency: 7 0 times per week     Types: Marijuana     Comment: CBD oil, THC for pain - Medical    Sexual activity: Not on file   Other Topics Concern    Not on file   Social History Narrative    Not on file     Social Determinants of Health     Financial Resource Strain: Not on file   Food Insecurity: No Food Insecurity    Worried About [address] in the Last Year: Never true    Ran Out of Food in the Last Year: Never true   Transportation Needs: No Transportation Needs    Lack of Transportation (Medical): No    Lack of Transportation (Non-Medical): No   Physical Activity: Not on file   Stress: Not on file   Social Connections: Not on file   Intimate Partner Violence: Not on file   Housing Stability: Low Risk     Unable to Pay for Housing in the Last Year: No    Number of Places Lived in the Last Year: 1    Unstable Housing in the Last Year: No       Past Medical History    Past Medical History:   Diagnosis Date    Abdominal pain     Ambulates with cane     Anxiety     Arthritis     Back pain     Breathing difficulty     " Waking Up with trouble breathing" -Post MRI patient had issues    Chronic kidney disease     COPD (chronic obstructive pulmonary disease) ([facility] [address] )     COVID-19 long hauler     Patient states her PCP stated that she has this-Uses inhaler as needed    Depression     Diarrhea     Dizziness     Edentulous     Fibromyalgia     Fibromyalgia, primary     Full dentures     Upper and Lower  Can't wear right now due to recent surgery for sleep surgery    GERD (gastroesophageal reflux disease)     Heartburn     History of colon polyps     History of COVID-19 02/21/2022    Cough,fever,vomiting,diahhrea,HA and sore throat-Not Hospitalized    History of fall     History of HPV infection     Hypertension     IBS (irritable bowel syndrome)     Intermittent palpitations     Tachycardia    Leg pain, bilateral     Migraines     Neuropathy     Bilateral hands, arms   Numbness tops of legs and buttocks and feet    Pneumonia     History of walking pneumonia multiple times    Restless leg syndrome     Seasonal allergies     Shortness of breath     Sleep apnea     History of- recent surgery to correct    Spinal stenosis     Narrowing    Urinary incontinence     Use of cane as ambulatory aid     Wears glasses        Surgical History    Past Surgical History:   Procedure Laterality Date    BLADDER SURGERY  02/10/2016    botox injections    BLOOD PATCH      post previous stim attempt    CERVICAL FUSION  2011, 1997, 2004    x4 C3-4, C4-5, C5-6, C6-7    CHOLECYSTECTOMY  2000    lap ad    COLONOSCOPY      COLONOSCOPY N/A 5/6/2016    Procedure: COLONOSCOPY;  Surgeon: Manisha Salguero MD;  [facility]: [facility]; Service:     GYNECOLOGIC CRYOSURGERY      IA ARTHRODESIS ANT INTERBODY MIN DISCECTOMY,LUMBAR N/A 9/13/2016    Procedure: FUSION LUMBAR INTERBODY ANTERIOR APPROACH  L4-5 L5-S1  WITH POSTERIOR INSTRUMENTATION ;  Surgeon: Gareth Sanon MD;  Location: [facility];  Service: Orthopedics    IA EXCISION [facility] N/A 8/24/2016    Procedure: Kristin Zaldivar;  Surgeon: Jill Shaw MD;  [facility];  Service: ENT    IA PALATOPHAYNGOPLASTY N/A 8/24/2016    Procedure: UVULOPALATOPHARYNGOPLASTY (UPPP);   Surgeon: Jill Shaw MD;  [facility];  Service: ENT    IA PERCUT IMPLNT [address] N/A 4/9/2021    Procedure: INSERTION CERVICAL DORSAL COLUMN SPINAL CORD STIMULATOR PERCUTANEOUS PERMANENT TRIAL;  Surgeon: Jaison Becker MD;  [facility];  Service: Neurosurgery    IA TOTAL KNEE ARTHROPLASTY Left 5/18/2022    Procedure: KNEE TOTAL ARTHROPLASTY;  Surgeon: Silvino Otero DO;  Location: [facility];  Service: Orthopedics    SKIN BIOPSY  2015    R arm, scalp- all benign    SPINAL CORD STIMULATOR IMPLANT      lumbar  in place   [address] [employer]      cervical    SPINAL CORD STIMULATOR REMOVAL N/A 4/16/2021    Procedure: REMOVAL OF SPINAL CORD STIMULATOR, OR REMOVAL OF EXTENSIONS AND CONNECTION TO [facility];  Surgeon: Jaison Becker MD;  Location: [facility];  Service: Neurosurgery    TONSILLECTOMY      TUBAL LIGATION         Medications      Current Outpatient Medications:     Capsaicin-Menthol (SALONPAS GEL-PATCH HOT EX), Apply 1 patch topically as needed, Disp: , Rfl:   divalproex sodium (DEPAKOTE ER) 250 mg 24 hr tablet, Take 250 mg by mouth daily, Disp: , Rfl:     DULoxetine (CYMBALTA) 60 mg delayed release capsule, Take 60 mg by mouth 2 (two) times a day   , Disp: , Rfl:     fluticasone-vilanterol (Breo Ellipta) 100-25 mcg/inh inhaler, Inhale 1 puff as needed  , Disp: , Rfl:     gabapentin (NEURONTIN) 300 mg capsule, Take 900 mg by mouth see administration instructions 3 capsuless of 300mg = 900 mg TID , Disp: , Rfl:     lisinopril (ZESTRIL) 20 mg tablet, Take 20 mg by mouth 2 (two) times a day  , Disp: , Rfl:     Menthol, Topical Analgesic, (BIOFREEZE EX), Apply topically, Disp: , Rfl:     omeprazole (PriLOSEC) 40 MG capsule, Take 40 mg by mouth daily in the early morning  , Disp: , Rfl:     oxyCODONE-acetaminophen (PERCOCET) 7 5-325 MG per tablet, , Disp: , Rfl:     rOPINIRole (REQUIP) 2 mg tablet, Take 2 mg by mouth daily at bedtime  , Disp: , Rfl:     ascorbic acid (VITAMIN C) 500 MG tablet, Take 1 tablet (500 mg total) by mouth 2 (two) times a day (Patient not taking: Reported on 8/23/2022), Disp: 60 tablet, Rfl: 1    Cholecalciferol (Vitamin D3) 125 MCG (5000 UT) CAPS, Take 1 capsule by mouth in the morning Prescribed by surgeon (Patient not taking: Reported on 8/23/2022), Disp: , Rfl:     Citalopram Hydrobromide (CELEXA PO), Take 40 mg by mouth daily in the early morning   (Patient not taking: Reported on 8/23/2022), Disp: , Rfl:     ferrous sulfate 324 (65 Fe) mg, Take 1 tablet (324 mg total) by mouth 2 (two) times a day before meals (Patient not taking: No sig reported), Disp: 60 tablet, Rfl: 1    folic acid (FOLVITE) 1 mg tablet, Take 1 tablet (1 mg total) by mouth daily (Patient not taking: No sig reported), Disp: 30 tablet, Rfl: 1    fondaparinux (ARIXTRA) 2 5 mg/0 5 mL, Inject 2 5 mg under the skin every 24 hours for 14 days (Patient not taking: Reported on 8/23/2022), Disp: 7 mL, Rfl: 0    Allergies    Allergies   Allergen Reactions    Strawberry Extract - Food Allergy Anaphylaxis    Contrast [Iodinated Diagnostic Agents] Hives     CT scan dye       The following portions of the patient's history were reviewed and updated as appropriate: allergies, current medications, past family history, past medical history, past social history, past surgical history and problem list     Investigations    I personally reviewed the MRI and EMG results with the patient:      Physical Exam    Vitals:  Blood pressure 128/78, pulse 101, temperature 99 1 °F (37 3 °C), temperature source Tympanic, resp  rate 16, height 5' 2" (1 575 m), weight 79 4 kg (175 lb), not currently breastfeeding  ,Body mass index is 32 01 kg/m²      General:  Normal, well developed, not in distress/pain     Skin:   No issues, no rashes noted     Musculoskeletal:   5/5 strength throughout all muscle groups  No tenderness to palpation of the spine       Neurologic Exam:  Awake and alert  Oriented x3  Speech clear and fluent  DOE   Decreased sensation in hands  Bilateral myrick's  No clonus  2+ patellar reflexes     Gait:   Slow slightly ataxic gait, normal posture

## 2022-09-30 ENCOUNTER — OFFICE VISIT (OUTPATIENT)
Dept: OBGYN CLINIC | Facility: CLINIC | Age: 57
End: 2022-09-30
Payer: MEDICARE

## 2022-09-30 VITALS — WEIGHT: 175 LBS | BODY MASS INDEX: 32.2 KG/M2 | HEIGHT: 62 IN

## 2022-09-30 DIAGNOSIS — Z96.652 S/P TOTAL KNEE ARTHROPLASTY, LEFT: Primary | ICD-10-CM

## 2022-09-30 PROCEDURE — 99213 OFFICE O/P EST LOW 20 MIN: CPT | Performed by: ORTHOPAEDIC SURGERY

## 2022-09-30 NOTE — PROGRESS NOTES
Assessment/Plan:   Diagnoses and all orders for this visit:    S/P total knee arthroplasty, left  -     XR knee 3 vw left non injury; Future         Approximately 4 months status post left total knee arthroplasty performed 05/18/2022 she is doing quite well  Her incision is well healed  Today's examination shows right knee flexion past 120° and no ligamentous dysfunction  She is advised to continue to ambulate with use of single-point cane and to avoid future falls  She is also seeing someone at [facility] for her left hand atrophy  She  follow-up in the office in 6 months for x-rays of bilateral knees with right knee being weight-bearing films  The patient is doing quite well from her left total knee replacement  Strength and motion are intact  Incision clean and dry  X-rays do show anatomic placement of prosthesis  Continue home exercise program   Encouraged ambulation  Follow-up in 6 months evaluation with new x-rays of left knee-three views as well as x-rays of right knee-three views weight-bearing  If her condition changes, she will not hesitate to let us know    Subjective:   Patient ID: German Wolf  1965     HPI  Patient is a 65 y o  female who presents for postop evaluation approximately 4 months status post left total knee arthroplasty performed 05/18/2022  She has finished formal physical therapy and has continued home exercise program   Patient denies any pain in her left knee at this time  She does report a fall about 2 weeks ago when she tripped over her tent while camping  She states that she did have some anterior bruising which has resolved      The following portions of the patient's history were reviewed and updated as appropriate:  Past medical history, past surgical history, Family history, social history, current medications and allergies    Past Medical History:   Diagnosis Date    Abdominal pain     Ambulates with cane     Anxiety     Arthritis  Back pain     Breathing difficulty     " Waking Up with trouble breathing" -Post MRI patient had issues    Chronic kidney disease     COPD (chronic obstructive pulmonary disease) ([facility] [address] )     COVID-19 long hauler     Patient states her PCP stated that she has this-Uses inhaler as needed    Depression     Diarrhea     Dizziness     Edentulous     Fibromyalgia     Fibromyalgia, primary     Full dentures     Upper and Lower  Can't wear right now due to recent surgery for sleep surgery    GERD (gastroesophageal reflux disease)     Heartburn     History of colon polyps     History of COVID-19 02/21/2022    Cough,fever,vomiting,diahhrea,HA and sore throat-Not Hospitalized    History of fall     History of HPV infection     Hypertension     IBS (irritable bowel syndrome)     Intermittent palpitations     Tachycardia    Leg pain, bilateral     Migraines     Neuropathy     Bilateral hands, arms  Numbness tops of legs and buttocks and feet    Pneumonia     History of walking pneumonia multiple times    Restless leg syndrome     Seasonal allergies     Shortness of breath     Sleep apnea     History of- recent surgery to correct    Spinal stenosis     Narrowing    Urinary incontinence     Use of cane as ambulatory aid     Wears glasses        Past Surgical History:   Procedure Laterality Date    BLADDER SURGERY  02/10/2016    botox injections    BLOOD PATCH      post previous stim attempt   [address] [facility]  2011, 1997, 2004    x4 C3-4, C4-5, C5-6, C6-7    CHOLECYSTECTOMY  2000    lap ad    COLONOSCOPY      COLONOSCOPY N/A 5/6/2016    Procedure: COLONOSCOPY;  Surgeon: Yogesh Vásquez MD;  [facility];   Service:     GYNECOLOGIC CRYOSURGERY      [place] ARTHRODESIS ANT INTERBODY MIN DISCECTOMY,LUMBAR N/A 9/13/2016    Procedure: FUSION LUMBAR INTERBODY ANTERIOR APPROACH  L4-5 L5-S1  WITH POSTERIOR INSTRUMENTATION ;  Surgeon: Vikas Small MD;  Location[facility];  Service: Orthopedics    HI EXCISION TURBINATE,SUBMUCOUS N/A 8/24/2016    Procedure: Geoffry Feather;  Surgeon: Virgie Cervantes MD;  [facility];  Service: ENT    HI PALATOPHAYNGOPLASTY N/A 8/24/2016    Procedure: UVULOPALATOPHARYNGOPLASTY (UPPP);   Surgeon: Virgie Cervantes MD;  [facility];  Service: ENT    HI PERCUT IMPLNT Kristi Been N/A 4/9/2021    Procedure: INSERTION CERVICAL DORSAL COLUMN SPINAL CORD STIMULATOR PERCUTANEOUS PERMANENT TRIAL;  Surgeon: Kallie Castrejon MD;  [facility]: [facility];  Service: Neurosurgery    HI TOTAL KNEE ARTHROPLASTY Left 5/18/2022    Procedure: KNEE TOTAL ARTHROPLASTY;  Surgeon: Brandon Stephens DO;  Location: [facility];  Service: Orthopedics    SKIN BIOPSY  2015    R arm, scalp- all benign    SPINAL CORD STIMULATOR IMPLANT      lumbar  in place    SPINAL CORD STIMULATOR REMOVAL      cervical    SPINAL CORD STIMULATOR REMOVAL N/A 4/16/2021    Procedure: REMOVAL OF SPINAL CORD STIMULATOR, OR REMOVAL OF EXTENSIONS AND CONNECTION TO GENERATOR;  Surgeon: Kallie Castrejon MD;  [facility];  Service: Neurosurgery    TONSILLECTOMY      TUBAL LIGATION         Family History   Problem Relation Age of Onset    Thyroid cancer Mother     Thyroid cancer Father     Alcohol abuse Sister     No Known Problems Brother     Cancer Family        Social History     Socioeconomic History    Marital status: /Civil Union     Spouse name: None    Number of children: None    Years of education: None    Highest education level: None   Occupational History    None   Tobacco Use    Smoking status: Current Every Day Smoker     Packs/day: 1 50     Years: 38 00     Pack years: 57 00    Smokeless tobacco: Never Used    Tobacco comment: encouraged smoking cessation   Vaping Use    Vaping Use: Never used   Substance and Sexual Activity    Alcohol use: No    Drug use: Yes     Frequency: 7 0 times per week     Types: Marijuana     Comment: CBD oil, THC for pain - Medical    Sexual activity: None   Other Topics Concern    None   Social History Narrative    None     Social Determinants of Health     Financial Resource Strain: Not on file   Food Insecurity: No Food Insecurity    Worried About Running Out of Food in the Last Year: Never true    Mague of Food in the Last Year: Never true   Transportation Needs: No Transportation Needs    Lack of Transportation (Medical): No    Lack of Transportation (Non-Medical): No   Physical Activity: Not on file   Stress: Not on file   Social Connections: Not on file   Intimate Partner Violence: Not on file   Housing Stability: Low Risk     Unable to Pay for Housing in the Last Year: No    Number of Places Lived in the Last Year: 1    Unstable Housing in the Last Year: No         Current Outpatient Medications:     Capsaicin-Menthol (SALONPAS GEL-PATCH HOT EX), Apply 1 patch topically as needed, Disp: , Rfl:     divalproex sodium (DEPAKOTE ER) 250 mg 24 hr tablet, Take 250 mg by mouth daily, Disp: , Rfl:     DULoxetine (CYMBALTA) 60 mg delayed release capsule, Take 60 mg by mouth 2 (two) times a day   , Disp: , Rfl:     fluticasone-vilanterol (Breo Ellipta) 100-25 mcg/inh inhaler, Inhale 1 puff as needed  , Disp: , Rfl:     gabapentin (NEURONTIN) 300 mg capsule, Take 900 mg by mouth see administration instructions 3 capsuless of 300mg = 900 mg TID , Disp: , Rfl:     lisinopril (ZESTRIL) 20 mg tablet, Take 20 mg by mouth 2 (two) times a day  , Disp: , Rfl:     Menthol, Topical Analgesic, (BIOFREEZE EX), Apply topically, Disp: , Rfl:     omeprazole (PriLOSEC) 40 MG capsule, Take 40 mg by mouth daily in the early morning  , Disp: , Rfl:     oxyCODONE-acetaminophen (PERCOCET) 7 5-325 MG per tablet, , Disp: , Rfl:     rOPINIRole (REQUIP) 2 mg tablet, Take 2 mg by mouth daily at bedtime  , Disp: , Rfl:     ascorbic acid (VITAMIN C) 500 MG tablet, Take 1 tablet (500 mg total) by mouth 2 (two) times a day (Patient not taking: Reported on 8/23/2022), Disp: 60 tablet, Rfl: 1    Cholecalciferol (Vitamin D3) 125 MCG (5000 UT) CAPS, Take 1 capsule by mouth in the morning Prescribed by surgeon (Patient not taking: No sig reported), Disp: , Rfl:     Citalopram Hydrobromide (CELEXA PO), Take 40 mg by mouth daily in the early morning   (Patient not taking: No sig reported), Disp: , Rfl:     ferrous sulfate 324 (65 Fe) mg, Take 1 tablet (324 mg total) by mouth 2 (two) times a day before meals (Patient not taking: No sig reported), Disp: 60 tablet, Rfl: 1    folic acid (FOLVITE) 1 mg tablet, Take 1 tablet (1 mg total) by mouth daily (Patient not taking: No sig reported), Disp: 30 tablet, Rfl: 1    fondaparinux (ARIXTRA) 2 5 mg/0 5 mL, Inject 2 5 mg under the skin every 24 hours for 14 days (Patient not taking: Reported on 8/23/2022), Disp: 7 mL, Rfl: 0    Allergies   Allergen Reactions    Strawberry Extract - Food Allergy Anaphylaxis    Contrast [Iodinated Diagnostic Agents] Hives     CT scan dye       Review of Systems   Constitutional: Negative for chills, fever and unexpected weight change  HENT: Negative for hearing loss, nosebleeds and sore throat  Eyes: Negative for pain, redness and visual disturbance  Respiratory: Negative for cough, shortness of breath and wheezing  Cardiovascular: Negative for chest pain, palpitations and leg swelling  Gastrointestinal: Negative for abdominal pain, nausea and vomiting  Endocrine: Negative for polydipsia and polyuria  Genitourinary: Negative for dysuria and hematuria  Skin: Negative for rash and wound  Neurological: Negative for dizziness and headaches  Psychiatric/Behavioral: Negative for decreased concentration, dysphoric mood and suicidal ideas  The patient is not nervous/anxious  Objective:  Ht 5' 2" (1 575 m)   Wt 79 4 kg (175 lb)   BMI 32 01 kg/m²     Ortho Exam   Left  Knee:   Surgical incision is well healed     There is no erythema or drainage present    Range of motion past 120 deg   Calf is soft and nontender  The patient is neurovascular intact distally  Physical Exam  Vitals and nursing note reviewed  Constitutional:       General: She is not in acute distress  Appearance: She is well-developed  HENT:      Head: Normocephalic and atraumatic  Eyes:      Conjunctiva/sclera: Conjunctivae normal    Cardiovascular:      Rate and Rhythm: Normal rate and regular rhythm  Heart sounds: No murmur heard  Pulmonary:      Effort: Pulmonary effort is normal  No respiratory distress  Breath sounds: Normal breath sounds  Abdominal:      Palpations: Abdomen is soft  Tenderness: There is no abdominal tenderness  Musculoskeletal:      Cervical back: Neck supple  Skin:     General: Skin is warm and dry  Neurological:      Mental Status: She is alert            Diagnostic Test Review:  X-rays left knee performed today show well-seated well-aligned left total knee arthroplasty hardware no signs of loosening or failure, no acute fractures or dislocations    Procedures   None     Scribe Attestation    I,:  Mary Lee am acting as a scribe while in the presence of the attending physician :       I,:  Gerardo Venegas DO personally performed the services described in this documentation    as scribed in my presence :

## 2022-12-14 ENCOUNTER — TELEPHONE (OUTPATIENT)
Dept: NEUROSURGERY | Facility: CLINIC | Age: 57
End: 2022-12-14

## 2022-12-14 NOTE — TELEPHONE ENCOUNTER
Last OV: 8/23/22 Mamie     · H/o prior lumbar fusion by Dr Sergio Morales yrs ago  · H/o L3-4 laminectomy and PLIF, extension of fusion to L3-S1 in 2019 at [address]  · 8/21/2019 Dr Ehsan Cope ([address] [place] [address] Neurosurgery) - S/p spinal cord stimulator stage 1 and 2 with laminectomy and pedal electrode  · 4/9/2021 Dr Wilbert Varma  · 4/16/2021 Dr Odin Shen, OR REMOVAL OF EXTENSIONS AND CONNECTION TO GENERATOR      Patient has an upcoming appointment on 12/16/22 and it states she is being seen for issues with wire  I called patient and Lvm to confirm appt and get more information about reason for appt

## 2022-12-16 NOTE — TELEPHONE ENCOUNTER
Sent text msg through the "Kimber[facility]" advising patient to contact the office to reschedule the appt for today because provider out of office

## (undated) DEVICE — CEMENT BOWL VACUUM MIXING

## (undated) DEVICE — SUT VICRYL 2-0 CP-1 27 IN J266H

## (undated) DEVICE — NEEDLE HYPO 22G X 1-1/2 IN

## (undated) DEVICE — GLOVE INDICATOR PI UNDERGLOVE SZ 8 BLUE

## (undated) DEVICE — 35CM LONG TUNNELING TOOL

## (undated) DEVICE — STOCKINETTE,IMPERVIOUS,12X48,STERILE: Brand: MEDLINE

## (undated) DEVICE — CERVICAL COLLAR SOFT MED

## (undated) DEVICE — REMOTE CONTROL KIT: Brand: FREELINK™

## (undated) DEVICE — GLOVE INDICATOR PI UNDERGLOVE SZ 7.5 BLUE

## (undated) DEVICE — SYRINGE EPI 8ML LUER SLIP LOSS OF RESISTANCE PLASTIC PERFIX

## (undated) DEVICE — MEDI-VAC YANK SUCT HNDL W/TPRD BULBOUS TIP: Brand: CARDINAL HEALTH

## (undated) DEVICE — SUT MONOCRYL 4-0 PS-2 27 IN Y426H

## (undated) DEVICE — SAW BLADE RECIPROCATING 179

## (undated) DEVICE — UTILITY MARKER,BLACK WITH LABELS: Brand: DEVON

## (undated) DEVICE — PADDING CAST 6IN COTTON STRL

## (undated) DEVICE — ANTIBACTERIAL VIOLET BRAIDED (POLYGLACTIN 910), SYNTHETIC ABSORBABLE SUTURE: Brand: COATED VICRYL

## (undated) DEVICE — GAUZE SPONGES,16 PLY: Brand: CURITY

## (undated) DEVICE — OR CABLE 2X8 61CM AND EXTENSION

## (undated) DEVICE — GLOVE SRG BIOGEL 7.5

## (undated) DEVICE — BINDER ABDOMINAL 30-45 IN

## (undated) DEVICE — DRAPE C-ARMOUR

## (undated) DEVICE — ELECTRODE BLADE MOD E-Z CLEAN  2.75IN 7CM -0012AM

## (undated) DEVICE — TIBURON SPLIT SHEET: Brand: CONVERTORS

## (undated) DEVICE — GLOVE INDICATOR PI UNDERGLOVE SZ 6.5 BLUE

## (undated) DEVICE — 3M™ STERI-DRAPE™ U-DRAPE 1015: Brand: STERI-DRAPE™

## (undated) DEVICE — T5 HOOD WITH PEEL AWAY FACE SHIELD

## (undated) DEVICE — DRESSING XEROFORM 5 X 9

## (undated) DEVICE — INTENDED FOR TISSUE SEPARATION, AND OTHER PROCEDURES THAT REQUIRE A SHARP SURGICAL BLADE TO PUNCTURE OR CUT.: Brand: BARD-PARKER ® CARBON RIB-BACK BLADES

## (undated) DEVICE — 3M™ IOBAN™ 2 ANTIMICROBIAL INCISE DRAPE 6650EZ: Brand: IOBAN™ 2

## (undated) DEVICE — SUT VICRYL 1 CP 27 IN J196H

## (undated) DEVICE — ASTOUND FABRIC REINFORCED SURGICAL GOWN: Brand: CONVERTORS

## (undated) DEVICE — ADHESIVE SKIN HIGH VISCOSITY EXOFIN 1ML

## (undated) DEVICE — 3M™ IOBAN™ 2 ANTIMICROBIAL INCISE DRAPE 6651EZ: Brand: IOBAN™ 2

## (undated) DEVICE — VIAL DECANTER

## (undated) DEVICE — READY WET SKIN SCRUB TRAY-LF: Brand: MEDLINE INDUSTRIES, INC.

## (undated) DEVICE — PREP SURGICAL PURPREP 26ML

## (undated) DEVICE — SUT SILK 2-0 SH 30 IN K833H

## (undated) DEVICE — SPONGE SCRUB 4 PCT CHLORHEXIDINE

## (undated) DEVICE — Device

## (undated) DEVICE — PENCIL ELECTROSURG E-Z CLEAN -0035H

## (undated) DEVICE — BLADE REAMER PATELLA 46MM

## (undated) DEVICE — STAPLER SKIN 35 WIDE ULC APPOSE

## (undated) DEVICE — GLOVE SRG BIOGEL ECLIPSE 7

## (undated) DEVICE — BLOOD CONSERVATION SYSTEM WITH QUICK DISCONNECT AND PVC DRAIN: Brand: CBCII CONSTAVAC

## (undated) DEVICE — ABDOMINAL PAD: Brand: DERMACEA

## (undated) DEVICE — FRAZIER SUCTION INSTRUMENT 18 FR W/OBTURATOR, NO CONTROL VENT: Brand: FRAZIER

## (undated) DEVICE — 10FR FRAZIER SUCTION HANDLE: Brand: CARDINAL HEALTH

## (undated) DEVICE — GLOVE SRG BIOGEL 6.5

## (undated) DEVICE — 3M™ TEGADERM™ TRANSPARENT FILM DRESSING FRAME STYLE, 1627, 4 IN X 10 IN (10 CM X 25 CM), 20/CT 4CT/CASE: Brand: 3M™ TEGADERM™

## (undated) DEVICE — BETHLEHEM UNIV TOTAL KNEE, KIT: Brand: CARDINAL HEALTH

## (undated) DEVICE — RX COUDÉ® EPIDURAL NEEDLE 14G TW X 4.0": Brand: EPIMED

## (undated) DEVICE — INTENDED FOR TISSUE SEPARATION, AND OTHER PROCEDURES THAT REQUIRE A SHARP SURGICAL BLADE TO PUNCTURE OR CUT.: Brand: BARD-PARKER SAFETY BLADES SIZE 10, STERILE

## (undated) DEVICE — ACE WRAP 6 IN XL STERILE

## (undated) DEVICE — DRAPE CAMERA/LASER

## (undated) DEVICE — T4 HOOD

## (undated) DEVICE — SPONGE LAP 18 X 18 IN STRL RFD

## (undated) DEVICE — 4-PORT MANIFOLD: Brand: NEPTUNE 2

## (undated) DEVICE — CHARGING SYSTEM KIT: Brand: PRECISION™

## (undated) DEVICE — NO-SCRATCH ™ SMALL WHITNEY CURETTE ™ IS A SINGLE-USE, PLASTIC CURETTE FOR QUICKLY APPLYING, MANIPULATING AND REMOVING BONE CEMENT DURING HIP AND KNEE REPLACEMENT SURGERY. THE PLASTIC IS SOFTER THAN STEEL INSTRUMENTS, REDUCING THE RISK OF DAMAGING THE PROSTHESIS WITH METAL INSTRUMENTS.  THE CURETTE’S 6MM TIP REMOVES EXCESS CEMENT FROM REPLACEMENT HIPS AND KNEES. EASY-TO-MANEUVER, THE SMALL BLUE CURETTE LETS YOU REMOVE CEMENT FROM ALL EDGES OF THE PROSTHESIS.NO-SCRATCH WHITNEY SMALL CURETTE FEATURES:SAFER THAN STEEL- MADE OF PLASTIC - STURDY YET SOFTER THAN SURGICAL STEEL.HANDIER- EACH TOOL HAS A MOLDED-IN THUMB INDENTATION INSTANTLY ORIENTING THE TOOL.- EASIER TO MANEUVER IN HARD TO SEE PLACES.- COLOR-CODED FOR EASY IDENTIFICATION.FASTER- COMES INDIVIDUALLY PACKAGED IN STERILE, PEEL OPEN POUCH, READY TO GO.- APPLIES, MANIPULATES, OR REMOVES CEMENT WITH FINGERTIP PRECISION.ECONOMICAL- THE COST OF A SINGLE REVISION DWARFS THE COST OF A SINGLE-USE CURETTE. - DISPOSABLE – THERE’S NO NEED TO WASTE TIME REMOVING HARDENED CEMENT OR RE-STERILIZING TOOLS.- LESS EXPENSIVE TO BUY AND INVENTORY - ORDER ONLY THE TOOL YOU USE.- PACKAGED 25 INDIVIDUALLY WRAPPED TOOLS TO A CARTON FOR CONVENIENT SHELF STORAGE.: Brand: WHITNEY NO-SCRATCH CURETTE (SMALL)

## (undated) DEVICE — HEMOVAC TUBING 1/4 IN

## (undated) DEVICE — DRAPE C-ARM X-RAY

## (undated) DEVICE — PROXIMATE SKIN STAPLERS (35 WIDE) CONTAINS 35 STAINLESS STEEL STAPLES (FIXED HEAD): Brand: PROXIMATE

## (undated) DEVICE — TUBING SUCTION 5MM X 12 FT

## (undated) DEVICE — DRESSING MEPILEX AG BORDER 4 X 8 IN

## (undated) DEVICE — CUFF TOURNIQUET 30 X 4 IN QUICK CONNECT DISP 1BLA

## (undated) DEVICE — STRAP LITHOTOMY CANDY CANE

## (undated) DEVICE — FAN SPRAY KIT: Brand: PULSAVAC®

## (undated) DEVICE — TOWEL SURG XR DETECT GREEN STRL RFD

## (undated) DEVICE — 3M™ DURAPORE™ SURGICAL TAPE 1538-3, 3 INCH X 10 YARD (7,5CM X 9,1M), 4 ROLLS/BOX: Brand: 3M™ DURAPORE™

## (undated) DEVICE — SUT VICRYL 0 CP-1 27 IN J267H

## (undated) DEVICE — SKIN MARKER DUAL TIP WITH RULER CAP, FLEXIBLE RULER AND LABELS: Brand: DEVON

## (undated) DEVICE — CAPIT KNEE GSF FLX CEM FEM/CEM TIB/FLX SURF/STD PAT - ZIMMER

## (undated) DEVICE — PLUMEPEN PRO 10FT

## (undated) DEVICE — PAD GROUNDING ADULT

## (undated) DEVICE — ELECTRODE BLADE MOD E-Z CLEAN 2.5IN 6.4CM -0012M

## (undated) DEVICE — BETHLEHEM UNIVERSAL MINOR GEN: Brand: CARDINAL HEALTH

## (undated) DEVICE — GLOVE SRG BIOGEL 8

## (undated) DEVICE — BLADE SAW HALL MICROPOWER OSC 1.27MM X 19.5MM X 86MM

## (undated) DEVICE — COBAN 6 IN STERILE

## (undated) DEVICE — HEX WRENCH, 7.6CM: Brand: CLIK™ ANCHOR